# Patient Record
Sex: FEMALE | Race: WHITE | Employment: OTHER | URBAN - METROPOLITAN AREA
[De-identification: names, ages, dates, MRNs, and addresses within clinical notes are randomized per-mention and may not be internally consistent; named-entity substitution may affect disease eponyms.]

---

## 2019-07-10 ENCOUNTER — APPOINTMENT (OUTPATIENT)
Dept: CT IMAGING | Age: 84
DRG: 603 | End: 2019-07-10
Payer: MEDICARE

## 2019-07-10 ENCOUNTER — HOSPITAL ENCOUNTER (EMERGENCY)
Age: 84
Discharge: HOME | DRG: 603 | End: 2019-07-11
Attending: EMERGENCY MEDICINE
Payer: MEDICARE

## 2019-07-10 DIAGNOSIS — L02.31 CUTANEOUS ABSCESS OF BUTTOCK: ICD-10-CM

## 2019-07-10 DIAGNOSIS — L03.317 CELLULITIS OF BUTTOCK, RIGHT: ICD-10-CM

## 2019-07-10 DIAGNOSIS — R21 RASH AND OTHER NONSPECIFIC SKIN ERUPTION: Primary | ICD-10-CM

## 2019-07-10 LAB
BASOPHILS ABSOLUTE: 0.04 E9/L (ref 0–0.2)
BASOPHILS RELATIVE PERCENT: 0.3 % (ref 0–2)
EOSINOPHILS ABSOLUTE: 0.11 E9/L (ref 0.05–0.5)
EOSINOPHILS RELATIVE PERCENT: 0.9 % (ref 0–6)
HCT VFR BLD CALC: 36.3 % (ref 34–48)
HEMOGLOBIN: 12.1 G/DL (ref 11.5–15.5)
IMMATURE GRANULOCYTES #: 0.08 E9/L
IMMATURE GRANULOCYTES %: 0.6 % (ref 0–5)
LYMPHOCYTES ABSOLUTE: 1.46 E9/L (ref 1.5–4)
LYMPHOCYTES RELATIVE PERCENT: 11.5 % (ref 20–42)
MCH RBC QN AUTO: 31.3 PG (ref 26–35)
MCHC RBC AUTO-ENTMCNC: 33.3 % (ref 32–34.5)
MCV RBC AUTO: 94 FL (ref 80–99.9)
MONOCYTES ABSOLUTE: 0.72 E9/L (ref 0.1–0.95)
MONOCYTES RELATIVE PERCENT: 5.7 % (ref 2–12)
NEUTROPHILS ABSOLUTE: 10.29 E9/L (ref 1.8–7.3)
NEUTROPHILS RELATIVE PERCENT: 81 % (ref 43–80)
PDW BLD-RTO: 13 FL (ref 11.5–15)
PLATELET # BLD: 203 E9/L (ref 130–450)
PMV BLD AUTO: 9.2 FL (ref 7–12)
RBC # BLD: 3.86 E12/L (ref 3.5–5.5)
WBC # BLD: 12.7 E9/L (ref 4.5–11.5)

## 2019-07-10 PROCEDURE — 6360000002 HC RX W HCPCS: Performed by: EMERGENCY MEDICINE

## 2019-07-10 PROCEDURE — 83735 ASSAY OF MAGNESIUM: CPT

## 2019-07-10 PROCEDURE — 74176 CT ABD & PELVIS W/O CONTRAST: CPT

## 2019-07-10 PROCEDURE — 96375 TX/PRO/DX INJ NEW DRUG ADDON: CPT

## 2019-07-10 PROCEDURE — 85025 COMPLETE CBC W/AUTO DIFF WBC: CPT

## 2019-07-10 PROCEDURE — 80053 COMPREHEN METABOLIC PANEL: CPT

## 2019-07-10 PROCEDURE — 6370000000 HC RX 637 (ALT 250 FOR IP): Performed by: EMERGENCY MEDICINE

## 2019-07-10 PROCEDURE — 99284 EMERGENCY DEPT VISIT MOD MDM: CPT

## 2019-07-10 PROCEDURE — 2580000003 HC RX 258: Performed by: EMERGENCY MEDICINE

## 2019-07-10 PROCEDURE — 70450 CT HEAD/BRAIN W/O DYE: CPT

## 2019-07-10 PROCEDURE — 96374 THER/PROPH/DIAG INJ IV PUSH: CPT

## 2019-07-10 RX ORDER — 0.9 % SODIUM CHLORIDE 0.9 %
500 INTRAVENOUS SOLUTION INTRAVENOUS ONCE
Status: COMPLETED | OUTPATIENT
Start: 2019-07-10 | End: 2019-07-11

## 2019-07-10 RX ORDER — MECLIZINE HCL 12.5 MG/1
50 TABLET ORAL ONCE
Status: COMPLETED | OUTPATIENT
Start: 2019-07-10 | End: 2019-07-10

## 2019-07-10 RX ORDER — KETOROLAC TROMETHAMINE 30 MG/ML
15 INJECTION, SOLUTION INTRAMUSCULAR; INTRAVENOUS ONCE
Status: COMPLETED | OUTPATIENT
Start: 2019-07-10 | End: 2019-07-10

## 2019-07-10 RX ORDER — ONDANSETRON 2 MG/ML
4 INJECTION INTRAMUSCULAR; INTRAVENOUS ONCE
Status: COMPLETED | OUTPATIENT
Start: 2019-07-10 | End: 2019-07-10

## 2019-07-10 RX ADMIN — MECLIZINE 50 MG: 12.5 TABLET ORAL at 23:38

## 2019-07-10 RX ADMIN — ONDANSETRON 4 MG: 2 INJECTION INTRAMUSCULAR; INTRAVENOUS at 23:38

## 2019-07-10 RX ADMIN — KETOROLAC TROMETHAMINE 15 MG: 30 INJECTION, SOLUTION INTRAMUSCULAR; INTRAVENOUS at 23:38

## 2019-07-10 RX ADMIN — SODIUM CHLORIDE 500 ML: 9 INJECTION, SOLUTION INTRAVENOUS at 23:38

## 2019-07-10 ASSESSMENT — PAIN DESCRIPTION - FREQUENCY: FREQUENCY: CONTINUOUS

## 2019-07-10 ASSESSMENT — PAIN SCALES - GENERAL
PAINLEVEL_OUTOF10: 10
PAINLEVEL_OUTOF10: 10

## 2019-07-10 ASSESSMENT — PAIN DESCRIPTION - DESCRIPTORS: DESCRIPTORS: ACHING;THROBBING;TENDER

## 2019-07-10 ASSESSMENT — PAIN DESCRIPTION - LOCATION: LOCATION: BUTTOCKS

## 2019-07-10 ASSESSMENT — PAIN DESCRIPTION - PAIN TYPE: TYPE: ACUTE PAIN

## 2019-07-11 VITALS
OXYGEN SATURATION: 95 % | WEIGHT: 116 LBS | RESPIRATION RATE: 18 BRPM | HEART RATE: 75 BPM | DIASTOLIC BLOOD PRESSURE: 59 MMHG | HEIGHT: 60 IN | SYSTOLIC BLOOD PRESSURE: 129 MMHG | BODY MASS INDEX: 22.78 KG/M2 | TEMPERATURE: 98.2 F

## 2019-07-11 LAB
ALBUMIN SERPL-MCNC: 3.9 G/DL (ref 3.5–5.2)
ALP BLD-CCNC: 57 U/L (ref 35–104)
ALT SERPL-CCNC: 14 U/L (ref 0–32)
ANION GAP SERPL CALCULATED.3IONS-SCNC: 16 MMOL/L (ref 7–16)
AST SERPL-CCNC: 29 U/L (ref 0–31)
BILIRUB SERPL-MCNC: 0.4 MG/DL (ref 0–1.2)
BUN BLDV-MCNC: 15 MG/DL (ref 8–23)
CALCIUM SERPL-MCNC: 9.7 MG/DL (ref 8.6–10.2)
CHLORIDE BLD-SCNC: 98 MMOL/L (ref 98–107)
CO2: 26 MMOL/L (ref 22–29)
CREAT SERPL-MCNC: 0.7 MG/DL (ref 0.5–1)
GFR AFRICAN AMERICAN: >60
GFR NON-AFRICAN AMERICAN: >60 ML/MIN/1.73
GLUCOSE BLD-MCNC: 141 MG/DL (ref 74–99)
MAGNESIUM: 1.3 MG/DL (ref 1.6–2.6)
POTASSIUM REFLEX MAGNESIUM: 3.1 MMOL/L (ref 3.5–5)
SODIUM BLD-SCNC: 140 MMOL/L (ref 132–146)
TOTAL PROTEIN: 6.9 G/DL (ref 6.4–8.3)

## 2019-07-11 PROCEDURE — 6370000000 HC RX 637 (ALT 250 FOR IP)

## 2019-07-11 RX ORDER — POTASSIUM CHLORIDE 20 MEQ/1
40 TABLET, EXTENDED RELEASE ORAL ONCE
Status: DISCONTINUED | OUTPATIENT
Start: 2019-07-11 | End: 2019-07-11

## 2019-07-11 RX ORDER — DOXYCYCLINE HYCLATE 100 MG
100 TABLET ORAL 2 TIMES DAILY WITH MEALS
Qty: 20 TABLET | Refills: 0 | Status: ON HOLD | OUTPATIENT
Start: 2019-07-11 | End: 2019-07-12

## 2019-07-11 RX ORDER — POTASSIUM CHLORIDE 1.5 G/1.77G
40 POWDER, FOR SOLUTION ORAL ONCE
Status: DISCONTINUED | OUTPATIENT
Start: 2019-07-11 | End: 2019-07-11 | Stop reason: CLARIF

## 2019-07-11 RX ADMIN — POTASSIUM BICARBONATE 40 MEQ: 782 TABLET, EFFERVESCENT ORAL at 01:17

## 2019-07-11 NOTE — ED PROVIDER NOTES
Department of Emergency Medicine   ED  Provider Note  Admit Date/RoomTime: 7/10/2019  9:40 PM  ED Room: 24/24          History of Present Illness:  7/10/19, Time: 10:31 PM         Chidi Martinez is a 80 y.o. female presenting to the ED for rash, beginning 4 months ago. The complaint has been persistent, moderate in severity, and worsened by palpation. Pt states that she has red, rash to her left gluteal area. Reports that it started off like a small pimple but then increased in size and redness. States that the area is painful. Pt also reports that she has red, itchy rashes to her upper back, suprapubic area and lower abdomen. Was on her back first then spread to her suprapubic area and abdomen. States that those rashes started as a flat red spot which then  developed a blister which then burst. Very itchy at times but especially at onset and with blister. States that they remain itchy and red. Has been on antibiotics in April, also had prednisone, which did not help. Reports that she was seen by a dermatologist who prescribed her a compound cream which worsened her rash worse. Did not have a biopsy collected. Pt also complains of dizziness and nausea which began today. States that she has worsening dizziness when getting up and walking. Described it as feeling off balanced / room spinning. Denies recent URI symptoms. Denies tinnitus. No focal neuro deficits - Visual changes, Slurred Speech, Facial Droop, focal weakness / numbness / tingling. Denies spinning sensation. Has hx of hypertension. Denies chest pain, shortness of breath, fever, chills, abdominal pain, emesis, diarrhea, constipation, and further complaints at this time.       Review of Systems:   Pertinent positives and negatives are stated within HPI, all other systems reviewed and are negative.      --------------------------------------------- PAST HISTORY ---------------------------------------------  Past Medical History:  has a past medical

## 2019-07-12 ENCOUNTER — HOSPITAL ENCOUNTER (INPATIENT)
Age: 84
LOS: 5 days | Discharge: HOME HEALTH | DRG: 603 | End: 2019-07-17
Attending: INTERNAL MEDICINE | Admitting: INTERNAL MEDICINE
Payer: MEDICARE

## 2019-07-12 DIAGNOSIS — L02.31 GLUTEAL ABSCESS: Primary | ICD-10-CM

## 2019-07-12 PROBLEM — L03.90 CELLULITIS: Status: ACTIVE | Noted: 2019-07-12

## 2019-07-12 LAB
ALBUMIN SERPL-MCNC: 3.5 G/DL (ref 3.5–5.2)
ALP BLD-CCNC: 61 U/L (ref 35–104)
ALT SERPL-CCNC: 12 U/L (ref 0–32)
ANION GAP SERPL CALCULATED.3IONS-SCNC: 17 MMOL/L (ref 7–16)
ANTISTREPTOLYSIN-O: <20 IU/ML (ref 0–200)
AST SERPL-CCNC: 23 U/L (ref 0–31)
BASOPHILS ABSOLUTE: 0.05 E9/L (ref 0–0.2)
BASOPHILS RELATIVE PERCENT: 0.2 % (ref 0–2)
BILIRUB SERPL-MCNC: 0.5 MG/DL (ref 0–1.2)
BUN BLDV-MCNC: 16 MG/DL (ref 8–23)
C-REACTIVE PROTEIN: 28.9 MG/DL (ref 0–0.4)
CALCIUM SERPL-MCNC: 9.4 MG/DL (ref 8.6–10.2)
CHLORIDE BLD-SCNC: 96 MMOL/L (ref 98–107)
CO2: 24 MMOL/L (ref 22–29)
CREAT SERPL-MCNC: 0.6 MG/DL (ref 0.5–1)
EOSINOPHILS ABSOLUTE: 0 E9/L (ref 0.05–0.5)
EOSINOPHILS RELATIVE PERCENT: 0 % (ref 0–6)
GFR AFRICAN AMERICAN: >60
GFR NON-AFRICAN AMERICAN: >60 ML/MIN/1.73
GLUCOSE BLD-MCNC: 143 MG/DL (ref 74–99)
HCT VFR BLD CALC: 34.8 % (ref 34–48)
HEMOGLOBIN: 11.5 G/DL (ref 11.5–15.5)
IMMATURE GRANULOCYTES #: 0.19 E9/L
IMMATURE GRANULOCYTES %: 0.9 % (ref 0–5)
LACTIC ACID: 1.7 MMOL/L (ref 0.5–2.2)
LYMPHOCYTES ABSOLUTE: 1.27 E9/L (ref 1.5–4)
LYMPHOCYTES RELATIVE PERCENT: 5.7 % (ref 20–42)
MCH RBC QN AUTO: 31.2 PG (ref 26–35)
MCHC RBC AUTO-ENTMCNC: 33 % (ref 32–34.5)
MCV RBC AUTO: 94.3 FL (ref 80–99.9)
MONOCYTES ABSOLUTE: 1.13 E9/L (ref 0.1–0.95)
MONOCYTES RELATIVE PERCENT: 5.1 % (ref 2–12)
NEUTROPHILS ABSOLUTE: 19.58 E9/L (ref 1.8–7.3)
NEUTROPHILS RELATIVE PERCENT: 88.1 % (ref 43–80)
PDW BLD-RTO: 12.7 FL (ref 11.5–15)
PLATELET # BLD: 201 E9/L (ref 130–450)
PMV BLD AUTO: 10 FL (ref 7–12)
POTASSIUM SERPL-SCNC: 2.4 MMOL/L (ref 3.5–5)
RBC # BLD: 3.69 E12/L (ref 3.5–5.5)
REASON FOR REJECTION: NORMAL
REJECTED TEST: NORMAL
SODIUM BLD-SCNC: 137 MMOL/L (ref 132–146)
TOTAL PROTEIN: 6.9 G/DL (ref 6.4–8.3)
WBC # BLD: 22.2 E9/L (ref 4.5–11.5)

## 2019-07-12 PROCEDURE — 86060 ANTISTREPTOLYSIN O TITER: CPT

## 2019-07-12 PROCEDURE — 6370000000 HC RX 637 (ALT 250 FOR IP): Performed by: INTERNAL MEDICINE

## 2019-07-12 PROCEDURE — 2060000000 HC ICU INTERMEDIATE R&B

## 2019-07-12 PROCEDURE — 87081 CULTURE SCREEN ONLY: CPT

## 2019-07-12 PROCEDURE — 6360000002 HC RX W HCPCS: Performed by: INTERNAL MEDICINE

## 2019-07-12 PROCEDURE — 83605 ASSAY OF LACTIC ACID: CPT

## 2019-07-12 PROCEDURE — 85025 COMPLETE CBC W/AUTO DIFF WBC: CPT

## 2019-07-12 PROCEDURE — 80053 COMPREHEN METABOLIC PANEL: CPT

## 2019-07-12 PROCEDURE — 86140 C-REACTIVE PROTEIN: CPT

## 2019-07-12 PROCEDURE — 2580000003 HC RX 258: Performed by: INTERNAL MEDICINE

## 2019-07-12 PROCEDURE — 6360000002 HC RX W HCPCS: Performed by: SPECIALIST

## 2019-07-12 PROCEDURE — 36415 COLL VENOUS BLD VENIPUNCTURE: CPT

## 2019-07-12 PROCEDURE — 87040 BLOOD CULTURE FOR BACTERIA: CPT

## 2019-07-12 PROCEDURE — 2580000003 HC RX 258: Performed by: SPECIALIST

## 2019-07-12 RX ORDER — MAGNESIUM SULFATE IN WATER 40 MG/ML
2 INJECTION, SOLUTION INTRAVENOUS ONCE
Status: COMPLETED | OUTPATIENT
Start: 2019-07-12 | End: 2019-07-12

## 2019-07-12 RX ORDER — SODIUM CHLORIDE 0.9 % (FLUSH) 0.9 %
10 SYRINGE (ML) INJECTION EVERY 12 HOURS SCHEDULED
Status: DISCONTINUED | OUTPATIENT
Start: 2019-07-12 | End: 2019-07-17 | Stop reason: HOSPADM

## 2019-07-12 RX ORDER — ONDANSETRON 2 MG/ML
4 INJECTION INTRAMUSCULAR; INTRAVENOUS EVERY 6 HOURS PRN
Status: DISCONTINUED | OUTPATIENT
Start: 2019-07-12 | End: 2019-07-17 | Stop reason: HOSPADM

## 2019-07-12 RX ORDER — POTASSIUM BICARBONATE 25 MEQ/1
25 TABLET, EFFERVESCENT ORAL 2 TIMES DAILY
COMMUNITY
End: 2019-09-11

## 2019-07-12 RX ORDER — ACETAMINOPHEN 325 MG/1
650 TABLET ORAL EVERY 4 HOURS PRN
Status: DISCONTINUED | OUTPATIENT
Start: 2019-07-12 | End: 2019-07-13

## 2019-07-12 RX ORDER — POTASSIUM CHLORIDE 20 MEQ/1
40 TABLET, EXTENDED RELEASE ORAL 2 TIMES DAILY
Status: DISCONTINUED | OUTPATIENT
Start: 2019-07-12 | End: 2019-07-14

## 2019-07-12 RX ORDER — 0.9 % SODIUM CHLORIDE 0.9 %
500 INTRAVENOUS SOLUTION INTRAVENOUS ONCE
Status: COMPLETED | OUTPATIENT
Start: 2019-07-12 | End: 2019-07-12

## 2019-07-12 RX ORDER — ATENOLOL AND CHLORTHALIDONE TABLET 100; 25 MG/1; MG/1
1 TABLET ORAL DAILY
Status: ON HOLD | COMMUNITY
End: 2019-07-16 | Stop reason: HOSPADM

## 2019-07-12 RX ORDER — SODIUM CHLORIDE 9 MG/ML
INJECTION, SOLUTION INTRAVENOUS CONTINUOUS
Status: DISCONTINUED | OUTPATIENT
Start: 2019-07-12 | End: 2019-07-14

## 2019-07-12 RX ORDER — ACETAMINOPHEN, ASPIRIN AND CAFFEINE 250; 250; 65 MG/1; MG/1; MG/1
1 TABLET, FILM COATED ORAL EVERY 6 HOURS PRN
COMMUNITY

## 2019-07-12 RX ORDER — SODIUM CHLORIDE 0.9 % (FLUSH) 0.9 %
10 SYRINGE (ML) INJECTION PRN
Status: DISCONTINUED | OUTPATIENT
Start: 2019-07-12 | End: 2019-07-17 | Stop reason: HOSPADM

## 2019-07-12 RX ADMIN — ACETAMINOPHEN 650 MG: 325 TABLET ORAL at 21:34

## 2019-07-12 RX ADMIN — SODIUM CHLORIDE 500 ML: 9 INJECTION, SOLUTION INTRAVENOUS at 20:15

## 2019-07-12 RX ADMIN — ONDANSETRON 4 MG: 2 INJECTION INTRAMUSCULAR; INTRAVENOUS at 16:51

## 2019-07-12 RX ADMIN — CEFAZOLIN 1 G: 1 INJECTION, POWDER, FOR SOLUTION INTRAMUSCULAR; INTRAVENOUS; PARENTERAL at 18:01

## 2019-07-12 RX ADMIN — ENOXAPARIN SODIUM 40 MG: 40 INJECTION SUBCUTANEOUS at 16:50

## 2019-07-12 RX ADMIN — POTASSIUM CHLORIDE 40 MEQ: 20 TABLET, EXTENDED RELEASE ORAL at 20:30

## 2019-07-12 RX ADMIN — MAGNESIUM SULFATE HEPTAHYDRATE 2 G: 40 INJECTION, SOLUTION INTRAVENOUS at 21:35

## 2019-07-12 RX ADMIN — SODIUM CHLORIDE: 9 INJECTION, SOLUTION INTRAVENOUS at 16:50

## 2019-07-12 ASSESSMENT — PAIN SCALES - GENERAL
PAINLEVEL_OUTOF10: 3
PAINLEVEL_OUTOF10: 0
PAINLEVEL_OUTOF10: 3
PAINLEVEL_OUTOF10: 3

## 2019-07-12 NOTE — CONSULTS
rhinorrhea or epistaxis  Respiratory: No dyspnea or cough  Cardiovascular: No chest pain or palpitations  GI: No vomiting or diarrhea. Patient does admit to having nausea. : No burning, urgency or frequency on urination  Musculoskeletal: No arthralgias or myalgias  Skin: In the HPI    PHYSICAL EXAM:    Vitals: There were no vitals taken for this visit. Constitutional: The patient is awake, alert, and oriented. Daughter present. Skin: Warm and dry. See below. HEENT: Eyes show round, and reactive pupils. No jaundice. Moist mucous membranes, no ulcerations, no thrush. Neck: Supple to movements. No lymphadenopathy. Chest: No use of accessory muscles to breathe. Symmetrical expansion. Auscultation reveals no wheezing, crackles, or rhonchi. Cardiovascular: S1 and S2 are rhythmic and regular. No murmurs appreciated. Abdomen: Positive bowel sounds to auscultation. Benign to palpation. No masses felt. No hepatosplenomegaly. The suprapubic area shows a couple of scabbed lesions with some slight surrounding erythema. No pustules or fluctuance compatible with abscesses. No surrounding induration. The right buttock shows a necrotic area with surrounding erythema and induration. It measures approximately 2-1/2 x 6 cm. No fluctuance was noted. Extremities: No clubbing, no cyanosis, no edema.   Lines: peripheral      CBC+dif:  Recent Labs     07/10/19  2333   WBC 12.7*   HGB 12.1   HCT 36.3   MCV 94.0      NEUTROABS 10.29*     No results found for: CRP   No results found for: CRPHS  No results found for: SEDRATE  Lab Results   Component Value Date    ALT 14 07/10/2019    AST 29 07/10/2019    ALKPHOS 57 07/10/2019    BILITOT 0.4 07/10/2019     Lab Results   Component Value Date     07/10/2019    K 3.1 07/10/2019    CL 98 07/10/2019    CO2 26 07/10/2019    BUN 15 07/10/2019    CREATININE 0.7 07/10/2019    GFRAA >60 07/10/2019    LABGLOM >60 07/10/2019    GLUCOSE 141 07/10/2019    PROT 6.9

## 2019-07-12 NOTE — H&P
Date    WBC 12.7 07/10/2019    RBC 3.86 07/10/2019    HGB 12.1 07/10/2019    HCT 36.3 07/10/2019     07/10/2019    MCV 94.0 07/10/2019    MCH 31.3 07/10/2019    MCHC 33.3 07/10/2019    RDW 13.0 07/10/2019    LYMPHOPCT 11.5 07/10/2019    MONOPCT 5.7 07/10/2019    BASOPCT 0.3 07/10/2019    MONOSABS 0.72 07/10/2019    LYMPHSABS 1.46 07/10/2019    EOSABS 0.11 07/10/2019    BASOSABS 0.04 07/10/2019     CMP:  Lab Results   Component Value Date     07/10/2019    K 3.1 07/10/2019    CL 98 07/10/2019    CO2 26 07/10/2019    BUN 15 07/10/2019    CREATININE 0.7 07/10/2019    GFRAA >60 07/10/2019    LABGLOM >60 07/10/2019    GLUCOSE 141 07/10/2019    PROT 6.9 07/10/2019    LABALBU 3.9 07/10/2019    CALCIUM 9.7 07/10/2019    BILITOT 0.4 07/10/2019    ALKPHOS 57 07/10/2019    AST 29 07/10/2019    ALT 14 07/10/2019     PT/INR:  No results found for: PROTIME, INR  @cktotal:3,ckmb:3,ckmbindex:3,troponini:3@  U/A:  No results found for: NITRU, COLORU, PHUR, LABCAST, WBCUA, RBCUA, MUCUS, TRICHOMONAS, YEAST, BACTERIA, CLARITYU, SPECGRAV, UROBILINOGEN, BILIRUBINUR, BLOODU, GLUCOSEU, KETUA, AMORPHOUS       ASSESSMENT:      Active Problems:    Gluteal abscess  Resolved Problems:    * No resolved hospital problems.  *              PLAN:  Cellulitis with gluteal abscess-  Likely recurrent staph   will ask ID to see  gen surgery consult        Libra Fermin DO  3:02 PM  7/12/2019

## 2019-07-12 NOTE — PROGRESS NOTES
Wound care consult completed through [facility].        Electronically signed by Paul Ang RN on 7/12/2019 at 5:40 PM

## 2019-07-13 LAB
ANION GAP SERPL CALCULATED.3IONS-SCNC: 16 MMOL/L (ref 7–16)
ANION GAP SERPL CALCULATED.3IONS-SCNC: 17 MMOL/L (ref 7–16)
BASOPHILS ABSOLUTE: 0.04 E9/L (ref 0–0.2)
BASOPHILS RELATIVE PERCENT: 0.2 % (ref 0–2)
BUN BLDV-MCNC: 13 MG/DL (ref 8–23)
BUN BLDV-MCNC: 14 MG/DL (ref 8–23)
CALCIUM SERPL-MCNC: 8.9 MG/DL (ref 8.6–10.2)
CALCIUM SERPL-MCNC: 9 MG/DL (ref 8.6–10.2)
CHLORIDE BLD-SCNC: 102 MMOL/L (ref 98–107)
CHLORIDE BLD-SCNC: 97 MMOL/L (ref 98–107)
CO2: 22 MMOL/L (ref 22–29)
CO2: 22 MMOL/L (ref 22–29)
CREAT SERPL-MCNC: 0.6 MG/DL (ref 0.5–1)
CREAT SERPL-MCNC: 0.6 MG/DL (ref 0.5–1)
EOSINOPHILS ABSOLUTE: 0.01 E9/L (ref 0.05–0.5)
EOSINOPHILS RELATIVE PERCENT: 0 % (ref 0–6)
GFR AFRICAN AMERICAN: >60
GFR AFRICAN AMERICAN: >60
GFR NON-AFRICAN AMERICAN: >60 ML/MIN/1.73
GFR NON-AFRICAN AMERICAN: >60 ML/MIN/1.73
GLUCOSE BLD-MCNC: 108 MG/DL (ref 74–99)
GLUCOSE BLD-MCNC: 117 MG/DL (ref 74–99)
HCT VFR BLD CALC: 33.7 % (ref 34–48)
HEMOGLOBIN: 11.2 G/DL (ref 11.5–15.5)
IMMATURE GRANULOCYTES #: 0.36 E9/L
IMMATURE GRANULOCYTES %: 1.7 % (ref 0–5)
LYMPHOCYTES ABSOLUTE: 1.98 E9/L (ref 1.5–4)
LYMPHOCYTES RELATIVE PERCENT: 9.2 % (ref 20–42)
MAGNESIUM: 2 MG/DL (ref 1.6–2.6)
MCH RBC QN AUTO: 31.3 PG (ref 26–35)
MCHC RBC AUTO-ENTMCNC: 33.2 % (ref 32–34.5)
MCV RBC AUTO: 94.1 FL (ref 80–99.9)
MONOCYTES ABSOLUTE: 1.24 E9/L (ref 0.1–0.95)
MONOCYTES RELATIVE PERCENT: 5.8 % (ref 2–12)
NEUTROPHILS ABSOLUTE: 17.87 E9/L (ref 1.8–7.3)
NEUTROPHILS RELATIVE PERCENT: 83.1 % (ref 43–80)
PDW BLD-RTO: 12.7 FL (ref 11.5–15)
PLATELET # BLD: 215 E9/L (ref 130–450)
PMV BLD AUTO: 9.2 FL (ref 7–12)
POTASSIUM SERPL-SCNC: 2.8 MMOL/L (ref 3.5–5)
POTASSIUM SERPL-SCNC: 3.3 MMOL/L (ref 3.5–5)
RBC # BLD: 3.58 E12/L (ref 3.5–5.5)
SODIUM BLD-SCNC: 136 MMOL/L (ref 132–146)
SODIUM BLD-SCNC: 140 MMOL/L (ref 132–146)
WBC # BLD: 21.5 E9/L (ref 4.5–11.5)

## 2019-07-13 PROCEDURE — 6370000000 HC RX 637 (ALT 250 FOR IP): Performed by: INTERNAL MEDICINE

## 2019-07-13 PROCEDURE — 99223 1ST HOSP IP/OBS HIGH 75: CPT | Performed by: INTERNAL MEDICINE

## 2019-07-13 PROCEDURE — APPSS60 APP SPLIT SHARED TIME 46-60 MINUTES: Performed by: CLINICAL NURSE SPECIALIST

## 2019-07-13 PROCEDURE — 85025 COMPLETE CBC W/AUTO DIFF WBC: CPT

## 2019-07-13 PROCEDURE — 2580000003 HC RX 258: Performed by: SPECIALIST

## 2019-07-13 PROCEDURE — 6370000000 HC RX 637 (ALT 250 FOR IP)

## 2019-07-13 PROCEDURE — 87186 SC STD MICRODIL/AGAR DIL: CPT

## 2019-07-13 PROCEDURE — 83735 ASSAY OF MAGNESIUM: CPT

## 2019-07-13 PROCEDURE — 87075 CULTR BACTERIA EXCEPT BLOOD: CPT

## 2019-07-13 PROCEDURE — 6360000002 HC RX W HCPCS: Performed by: INTERNAL MEDICINE

## 2019-07-13 PROCEDURE — 93005 ELECTROCARDIOGRAM TRACING: CPT | Performed by: CLINICAL NURSE SPECIALIST

## 2019-07-13 PROCEDURE — 80048 BASIC METABOLIC PNL TOTAL CA: CPT

## 2019-07-13 PROCEDURE — 0H98XZZ DRAINAGE OF BUTTOCK SKIN, EXTERNAL APPROACH: ICD-10-PCS | Performed by: SURGERY

## 2019-07-13 PROCEDURE — 2580000003 HC RX 258: Performed by: INTERNAL MEDICINE

## 2019-07-13 PROCEDURE — 6360000002 HC RX W HCPCS: Performed by: SPECIALIST

## 2019-07-13 PROCEDURE — 2500000003 HC RX 250 WO HCPCS: Performed by: SURGERY

## 2019-07-13 PROCEDURE — 2060000000 HC ICU INTERMEDIATE R&B

## 2019-07-13 PROCEDURE — 2580000003 HC RX 258: Performed by: SURGERY

## 2019-07-13 PROCEDURE — 6370000000 HC RX 637 (ALT 250 FOR IP): Performed by: SURGERY

## 2019-07-13 PROCEDURE — 6360000002 HC RX W HCPCS: Performed by: CLINICAL NURSE SPECIALIST

## 2019-07-13 PROCEDURE — 36415 COLL VENOUS BLD VENIPUNCTURE: CPT

## 2019-07-13 PROCEDURE — 6360000002 HC RX W HCPCS: Performed by: SURGERY

## 2019-07-13 PROCEDURE — 87070 CULTURE OTHR SPECIMN AEROBIC: CPT

## 2019-07-13 RX ORDER — DIGOXIN 250 MCG
TABLET ORAL
Status: COMPLETED
Start: 2019-07-13 | End: 2019-07-13

## 2019-07-13 RX ORDER — ACETAMINOPHEN 650 MG
TABLET, EXTENDED RELEASE ORAL PRN
Status: DISCONTINUED | OUTPATIENT
Start: 2019-07-13 | End: 2019-07-17 | Stop reason: HOSPADM

## 2019-07-13 RX ORDER — HYDROCODONE BITARTRATE AND ACETAMINOPHEN 5; 325 MG/1; MG/1
1 TABLET ORAL EVERY 6 HOURS PRN
Status: DISCONTINUED | OUTPATIENT
Start: 2019-07-13 | End: 2019-07-17 | Stop reason: HOSPADM

## 2019-07-13 RX ORDER — HYDROXYZINE PAMOATE 25 MG/1
25 CAPSULE ORAL 2 TIMES DAILY PRN
Status: DISCONTINUED | OUTPATIENT
Start: 2019-07-13 | End: 2019-07-17 | Stop reason: HOSPADM

## 2019-07-13 RX ORDER — METOPROLOL TARTRATE 50 MG/1
TABLET, FILM COATED ORAL
Status: COMPLETED
Start: 2019-07-13 | End: 2019-07-13

## 2019-07-13 RX ORDER — METOPROLOL TARTRATE 50 MG/1
50 TABLET, FILM COATED ORAL ONCE
Status: DISCONTINUED | OUTPATIENT
Start: 2019-07-13 | End: 2019-07-17 | Stop reason: HOSPADM

## 2019-07-13 RX ORDER — LIDOCAINE HYDROCHLORIDE 10 MG/ML
5 INJECTION, SOLUTION EPIDURAL; INFILTRATION; INTRACAUDAL; PERINEURAL ONCE
Status: COMPLETED | OUTPATIENT
Start: 2019-07-13 | End: 2019-07-13

## 2019-07-13 RX ORDER — METOPROLOL TARTRATE 50 MG/1
50 TABLET, FILM COATED ORAL 2 TIMES DAILY
Status: DISCONTINUED | OUTPATIENT
Start: 2019-07-13 | End: 2019-07-16

## 2019-07-13 RX ORDER — MECLIZINE HCL 12.5 MG/1
12.5 TABLET ORAL 3 TIMES DAILY PRN
Status: DISCONTINUED | OUTPATIENT
Start: 2019-07-13 | End: 2019-07-17 | Stop reason: HOSPADM

## 2019-07-13 RX ORDER — POTASSIUM CHLORIDE 20 MEQ/1
40 TABLET, EXTENDED RELEASE ORAL ONCE
Status: DISCONTINUED | OUTPATIENT
Start: 2019-07-13 | End: 2019-07-17

## 2019-07-13 RX ORDER — DIGOXIN 250 MCG
250 TABLET ORAL ONCE
Status: DISCONTINUED | OUTPATIENT
Start: 2019-07-13 | End: 2019-07-17 | Stop reason: HOSPADM

## 2019-07-13 RX ORDER — PROMETHAZINE HYDROCHLORIDE 25 MG/1
12.5 TABLET ORAL EVERY 6 HOURS PRN
Status: DISCONTINUED | OUTPATIENT
Start: 2019-07-13 | End: 2019-07-17 | Stop reason: HOSPADM

## 2019-07-13 RX ORDER — POTASSIUM CHLORIDE 7.45 MG/ML
10 INJECTION INTRAVENOUS
Status: COMPLETED | OUTPATIENT
Start: 2019-07-13 | End: 2019-07-13

## 2019-07-13 RX ADMIN — LIDOCAINE HYDROCHLORIDE 5 ML: 10 INJECTION, SOLUTION EPIDURAL; INFILTRATION; INTRACAUDAL; PERINEURAL at 17:53

## 2019-07-13 RX ADMIN — DIGOXIN: 250 TABLET ORAL at 02:00

## 2019-07-13 RX ADMIN — HYDROXYZINE PAMOATE 25 MG: 25 CAPSULE ORAL at 17:37

## 2019-07-13 RX ADMIN — CEFTRIAXONE 1 G: 1 INJECTION, POWDER, FOR SOLUTION INTRAMUSCULAR; INTRAVENOUS at 23:43

## 2019-07-13 RX ADMIN — ONDANSETRON 4 MG: 2 INJECTION INTRAMUSCULAR; INTRAVENOUS at 08:23

## 2019-07-13 RX ADMIN — METOPROLOL TARTRATE 50 MG: 50 TABLET ORAL at 08:20

## 2019-07-13 RX ADMIN — METOPROLOL TARTRATE: 50 TABLET, FILM COATED ORAL at 02:00

## 2019-07-13 RX ADMIN — POTASSIUM CHLORIDE 10 MEQ: 10 INJECTION, SOLUTION INTRAVENOUS at 11:33

## 2019-07-13 RX ADMIN — POTASSIUM CHLORIDE 10 MEQ: 10 INJECTION, SOLUTION INTRAVENOUS at 14:44

## 2019-07-13 RX ADMIN — POTASSIUM CHLORIDE 10 MEQ: 10 INJECTION, SOLUTION INTRAVENOUS at 10:32

## 2019-07-13 RX ADMIN — ENOXAPARIN SODIUM 40 MG: 40 INJECTION SUBCUTANEOUS at 08:20

## 2019-07-13 RX ADMIN — METOPROLOL TARTRATE 50 MG: 50 TABLET ORAL at 20:38

## 2019-07-13 RX ADMIN — POTASSIUM CHLORIDE 40 MEQ: 20 TABLET, EXTENDED RELEASE ORAL at 08:19

## 2019-07-13 RX ADMIN — POTASSIUM CHLORIDE 10 MEQ: 10 INJECTION, SOLUTION INTRAVENOUS at 13:18

## 2019-07-13 RX ADMIN — CEFAZOLIN 1 G: 1 INJECTION, POWDER, FOR SOLUTION INTRAMUSCULAR; INTRAVENOUS; PARENTERAL at 17:26

## 2019-07-13 RX ADMIN — SODIUM CHLORIDE: 9 INJECTION, SOLUTION INTRAVENOUS at 11:33

## 2019-07-13 RX ADMIN — HYDROCODONE BITARTRATE AND ACETAMINOPHEN 1 TABLET: 5; 325 TABLET ORAL at 18:16

## 2019-07-13 RX ADMIN — MECLIZINE 12.5 MG: 12.5 TABLET ORAL at 11:55

## 2019-07-13 RX ADMIN — POTASSIUM CHLORIDE 40 MEQ: 20 TABLET, EXTENDED RELEASE ORAL at 16:07

## 2019-07-13 RX ADMIN — PROMETHAZINE HYDROCHLORIDE 12.5 MG: 25 TABLET ORAL at 11:55

## 2019-07-13 RX ADMIN — CEFAZOLIN 1 G: 1 INJECTION, POWDER, FOR SOLUTION INTRAMUSCULAR; INTRAVENOUS; PARENTERAL at 06:30

## 2019-07-13 RX ADMIN — Medication: at 17:53

## 2019-07-13 RX ADMIN — Medication 10 ML: at 20:39

## 2019-07-13 RX ADMIN — ONDANSETRON 4 MG: 2 INJECTION INTRAMUSCULAR; INTRAVENOUS at 14:48

## 2019-07-13 RX ADMIN — METRONIDAZOLE 500 MG: 500 INJECTION, SOLUTION INTRAVENOUS at 18:18

## 2019-07-13 RX ADMIN — SODIUM CHLORIDE: 9 INJECTION, SOLUTION INTRAVENOUS at 00:39

## 2019-07-13 ASSESSMENT — PAIN SCALES - GENERAL
PAINLEVEL_OUTOF10: 0
PAINLEVEL_OUTOF10: 0
PAINLEVEL_OUTOF10: 4
PAINLEVEL_OUTOF10: 0

## 2019-07-13 ASSESSMENT — PAIN DESCRIPTION - LOCATION: LOCATION: BUTTOCKS

## 2019-07-13 ASSESSMENT — PAIN DESCRIPTION - DESCRIPTORS: DESCRIPTORS: DISCOMFORT;SORE;STABBING

## 2019-07-13 ASSESSMENT — ENCOUNTER SYMPTOMS
SHORTNESS OF BREATH: 0
DIARRHEA: 0
VOMITING: 0
NAUSEA: 1

## 2019-07-13 ASSESSMENT — PAIN DESCRIPTION - FREQUENCY: FREQUENCY: CONTINUOUS

## 2019-07-13 ASSESSMENT — PAIN DESCRIPTION - ORIENTATION: ORIENTATION: RIGHT;LOWER

## 2019-07-13 ASSESSMENT — PAIN - FUNCTIONAL ASSESSMENT: PAIN_FUNCTIONAL_ASSESSMENT: PREVENTS OR INTERFERES SOME ACTIVE ACTIVITIES AND ADLS

## 2019-07-13 ASSESSMENT — PAIN DESCRIPTION - ONSET: ONSET: ON-GOING

## 2019-07-13 ASSESSMENT — PAIN DESCRIPTION - PROGRESSION: CLINICAL_PROGRESSION: GRADUALLY WORSENING

## 2019-07-13 NOTE — CONSULTS
Inpatient Cardiology Consultation      Reason for Consult:  SVT    Consulting Physician: Dr. Ksenia Demarco    Requesting Physician:  Dr. Ryan Sender    Date of Consultation: 7/13/2019    History of Present Illness:    Mrs. Kari Chu is a 80year old lady who is previously unknown to our practice. She has a history of hypertension, and denies prior diagnosis of MI, CHF, or arrhythmia. She has been treated for a rash on her abdomen, back, and pelvic area since March 2019. She presented to the ED on July 10 due to this rash with associated discomfort, as well as nausea and dizziness; potassium was 3.1, magnesium 1.3, and CT of the abdomen showed probable cellulitis. She was treated with IV fluids, potassium supplement, Toradol, and Zofran and discharged home with antibiotics, but apparently did not fill the prescription. She was seen in Dr. Tej Posada office yesterday due to worsening discomfort, was thought to have an abscess, and admitted for further treatment. She was seen in consultation by infectious disease, started on antibiotics, and general surgery has been consulted. Last night, she had multiple episodes of SVT, and was given 50 mg of Lopressor and 250 mcg of Digoxin orally. Potassium level was 2.8. She describes feeling her heart \"jumping\" and \"beating fast\", and had similar episodes about one week ago and then two to three days ago. She denies associated chest discomfort, and has had frequent dizziness over the past several days which she attributes to not eating well. She has had no further palpitations this morning, but has continued nausea for which she is receiving Zofran. Past Medical History:    1. Hypertension  2. Polio in childhood  3. History of appendectomy  4. Depression     Medications Prior to Admit:  Prior to Admission medications    Medication Sig Start Date End Date Taking?  Authorizing Provider   atenolol-chlorthalidone (TENORETIC 100) 100-25 MG per tablet Take 1 tablet by mouth daily   Yes PSYCH: Oriented to person, place and time. Speech clear and appropriate. Follows all commands. Pleasant affect     Telemetry: SR with episodes of SVT with rates in 150s to 160s overnight  ECG: pending       Intake/Output Summary (Last 24 hours) at 7/13/2019 0923  Last data filed at 7/13/2019 0630  Gross per 24 hour   Intake 1523.33 ml   Output 300 ml   Net 1223.33 ml       Labs:   CBC:   Recent Labs     07/12/19  1700 07/13/19  0125   WBC 22.2* 21.5*   HGB 11.5 11.2*   HCT 34.8 33.7*    215     BMP:   Recent Labs     07/13/19  0125 07/13/19  0707    140   K 2.8* 3.3*   CO2 22 22   BUN 14 13   CREATININE 0.6 0.6   LABGLOM >60 >60   CALCIUM 8.9 9.0     Mag:   Recent Labs     07/10/19  2333   MG 1.3*     Phos: No results for input(s): PHOS in the last 72 hours. TSH: No results for input(s): TSH in the last 72 hours. HgA1c: No results found for: LABA1C  No results found for: EAG  proBNP: No results for input(s): PROBNP in the last 72 hours. PT/INR: No results for input(s): PROTIME, INR in the last 72 hours. APTT:No results for input(s): APTT in the last 72 hours. CARDIAC ENZYMES:No results for input(s): CKTOTAL, CKMB, CKMBINDEX, TROPONINI in the last 72 hours. FASTING LIPID PANEL:No results found for: CHOL, HDL, LDLDIRECT, LDLCALC, TRIG  LIVER PROFILE:  Recent Labs     07/10/19  2333 07/12/19  1830   AST 29 23   ALT 14 12   LABALBU 3.9 3.5     Assessment and Recommendations to follow by Dr. Ramos. Electronically signed by EMETERIO Miles on 7/13/2019 at 9:23 AM     The above documentation has been prepared under my direction and personally reviewed by me in its entirety. I confirm that the note above accurately reflects all work, treatment, procedures, and medical decision making performed by me. The patient's history was independently obtained. The patient was independently examined.  Electrocardiogram, prior and present cardiovascular assessment, and laboratory studies were

## 2019-07-13 NOTE — PLAN OF CARE
Problem: Risk for Impaired Skin Integrity  Goal: Tissue integrity - skin and mucous membranes  Description  Structural intactness and normal physiological function of skin and  mucous membranes.   Outcome: Met This Shift     Problem: Falls - Risk of:  Goal: Will remain free from falls  Description  Will remain free from falls  Outcome: Met This Shift  Goal: Absence of physical injury  Description  Absence of physical injury  Outcome: Met This Shift     Problem: Nausea/Vomiting:  Goal: Absence of nausea/vomiting  Description  Absence of nausea/vomiting  Outcome: Met This Shift  Goal: Able to drink  Description  Able to drink  Outcome: Met This Shift  Goal: Able to eat  Description  Able to eat  Outcome: Met This Shift  Goal: Ability to achieve adequate nutritional intake will improve  Description  Ability to achieve adequate nutritional intake will improve  Outcome: Met This Shift     Problem: Pain:  Goal: Pain level will decrease  Description  Pain level will decrease  Outcome: Met This Shift  Goal: Control of acute pain  Description  Control of acute pain  Outcome: Met This Shift  Goal: Control of chronic pain  Description  Control of chronic pain  Outcome: Met This Shift

## 2019-07-13 NOTE — PLAN OF CARE
Problem: Inadequate oral food/beverage intake (NI-2.1)  Goal: Food and/or Nutrient Delivery  Description - Ensure BID  Individualized approach for food/nutrient provision.   Outcome: Met This Shift

## 2019-07-14 LAB
ANION GAP SERPL CALCULATED.3IONS-SCNC: 12 MMOL/L (ref 7–16)
BASOPHILS ABSOLUTE: 0.05 E9/L (ref 0–0.2)
BASOPHILS RELATIVE PERCENT: 0.4 % (ref 0–2)
BUN BLDV-MCNC: 10 MG/DL (ref 8–23)
CALCIUM SERPL-MCNC: 8.6 MG/DL (ref 8.6–10.2)
CHLORIDE BLD-SCNC: 105 MMOL/L (ref 98–107)
CO2: 22 MMOL/L (ref 22–29)
CREAT SERPL-MCNC: 0.6 MG/DL (ref 0.5–1)
EOSINOPHILS ABSOLUTE: 0.25 E9/L (ref 0.05–0.5)
EOSINOPHILS RELATIVE PERCENT: 1.9 % (ref 0–6)
GFR AFRICAN AMERICAN: >60
GFR NON-AFRICAN AMERICAN: >60 ML/MIN/1.73
GLUCOSE BLD-MCNC: 105 MG/DL (ref 74–99)
HCT VFR BLD CALC: 29.7 % (ref 34–48)
HEMOGLOBIN: 9.8 G/DL (ref 11.5–15.5)
IMMATURE GRANULOCYTES #: 0.22 E9/L
IMMATURE GRANULOCYTES %: 1.6 % (ref 0–5)
LV EF: 70 %
LVEF MODALITY: NORMAL
LYMPHOCYTES ABSOLUTE: 3.08 E9/L (ref 1.5–4)
LYMPHOCYTES RELATIVE PERCENT: 23.1 % (ref 20–42)
MCH RBC QN AUTO: 31.7 PG (ref 26–35)
MCHC RBC AUTO-ENTMCNC: 33 % (ref 32–34.5)
MCV RBC AUTO: 96.1 FL (ref 80–99.9)
MONOCYTES ABSOLUTE: 0.88 E9/L (ref 0.1–0.95)
MONOCYTES RELATIVE PERCENT: 6.6 % (ref 2–12)
MRSA CULTURE ONLY: NORMAL
NEUTROPHILS ABSOLUTE: 8.88 E9/L (ref 1.8–7.3)
NEUTROPHILS RELATIVE PERCENT: 66.4 % (ref 43–80)
PDW BLD-RTO: 13.1 FL (ref 11.5–15)
PLATELET # BLD: 192 E9/L (ref 130–450)
PMV BLD AUTO: 9.4 FL (ref 7–12)
POTASSIUM SERPL-SCNC: 3.3 MMOL/L (ref 3.5–5)
RBC # BLD: 3.09 E12/L (ref 3.5–5.5)
SODIUM BLD-SCNC: 139 MMOL/L (ref 132–146)
WBC # BLD: 13.4 E9/L (ref 4.5–11.5)

## 2019-07-14 PROCEDURE — 36415 COLL VENOUS BLD VENIPUNCTURE: CPT

## 2019-07-14 PROCEDURE — 2580000003 HC RX 258: Performed by: INTERNAL MEDICINE

## 2019-07-14 PROCEDURE — 85025 COMPLETE CBC W/AUTO DIFF WBC: CPT

## 2019-07-14 PROCEDURE — 2580000003 HC RX 258: Performed by: SURGERY

## 2019-07-14 PROCEDURE — 6370000000 HC RX 637 (ALT 250 FOR IP): Performed by: INTERNAL MEDICINE

## 2019-07-14 PROCEDURE — 2500000003 HC RX 250 WO HCPCS: Performed by: SURGERY

## 2019-07-14 PROCEDURE — 2060000000 HC ICU INTERMEDIATE R&B

## 2019-07-14 PROCEDURE — 93306 TTE W/DOPPLER COMPLETE: CPT

## 2019-07-14 PROCEDURE — 99232 SBSQ HOSP IP/OBS MODERATE 35: CPT | Performed by: INTERNAL MEDICINE

## 2019-07-14 PROCEDURE — 6370000000 HC RX 637 (ALT 250 FOR IP): Performed by: SURGERY

## 2019-07-14 PROCEDURE — 6360000002 HC RX W HCPCS: Performed by: INTERNAL MEDICINE

## 2019-07-14 PROCEDURE — 80048 BASIC METABOLIC PNL TOTAL CA: CPT

## 2019-07-14 PROCEDURE — 6360000002 HC RX W HCPCS: Performed by: SURGERY

## 2019-07-14 RX ORDER — PERPHENAZINE AND AMITRIPTYLINE HYDROCHLORIDE 4; 25 MG/1; MG/1
1 TABLET, FILM COATED ORAL DAILY
Status: DISCONTINUED | OUTPATIENT
Start: 2019-07-14 | End: 2019-07-14 | Stop reason: SDUPTHER

## 2019-07-14 RX ORDER — POTASSIUM CHLORIDE 7.45 MG/ML
10 INJECTION INTRAVENOUS ONCE
Status: COMPLETED | OUTPATIENT
Start: 2019-07-14 | End: 2019-07-14

## 2019-07-14 RX ORDER — AMITRIPTYLINE HYDROCHLORIDE 25 MG/1
25 TABLET, FILM COATED ORAL DAILY
Status: DISCONTINUED | OUTPATIENT
Start: 2019-07-14 | End: 2019-07-17 | Stop reason: HOSPADM

## 2019-07-14 RX ORDER — PERPHENAZINE 4 MG/1
4 TABLET, FILM COATED ORAL DAILY
Status: DISCONTINUED | OUTPATIENT
Start: 2019-07-14 | End: 2019-07-17 | Stop reason: HOSPADM

## 2019-07-14 RX ADMIN — HYDROCODONE BITARTRATE AND ACETAMINOPHEN 1 TABLET: 5; 325 TABLET ORAL at 10:24

## 2019-07-14 RX ADMIN — MECLIZINE 12.5 MG: 12.5 TABLET ORAL at 08:04

## 2019-07-14 RX ADMIN — POTASSIUM CHLORIDE 10 MEQ: 10 INJECTION, SOLUTION INTRAVENOUS at 06:14

## 2019-07-14 RX ADMIN — METRONIDAZOLE 500 MG: 500 INJECTION, SOLUTION INTRAVENOUS at 02:54

## 2019-07-14 RX ADMIN — Medication 10 ML: at 20:03

## 2019-07-14 RX ADMIN — METRONIDAZOLE 500 MG: 500 INJECTION, SOLUTION INTRAVENOUS at 10:24

## 2019-07-14 RX ADMIN — HYDROCODONE BITARTRATE AND ACETAMINOPHEN 1 TABLET: 5; 325 TABLET ORAL at 18:21

## 2019-07-14 RX ADMIN — HYDROCODONE BITARTRATE AND ACETAMINOPHEN 1 TABLET: 5; 325 TABLET ORAL at 00:34

## 2019-07-14 RX ADMIN — SODIUM CHLORIDE: 9 INJECTION, SOLUTION INTRAVENOUS at 04:09

## 2019-07-14 RX ADMIN — ONDANSETRON 4 MG: 2 INJECTION INTRAMUSCULAR; INTRAVENOUS at 14:00

## 2019-07-14 RX ADMIN — METOPROLOL TARTRATE 50 MG: 50 TABLET ORAL at 20:03

## 2019-07-14 RX ADMIN — Medication 10 ML: at 08:57

## 2019-07-14 RX ADMIN — METOPROLOL TARTRATE 50 MG: 50 TABLET ORAL at 08:56

## 2019-07-14 RX ADMIN — METRONIDAZOLE 500 MG: 500 INJECTION, SOLUTION INTRAVENOUS at 18:56

## 2019-07-14 RX ADMIN — ENOXAPARIN SODIUM 40 MG: 40 INJECTION SUBCUTANEOUS at 08:57

## 2019-07-14 RX ADMIN — PERPHENAZINE 4 MG: 4 TABLET, FILM COATED ORAL at 18:21

## 2019-07-14 RX ADMIN — POTASSIUM CHLORIDE 10 MEQ: 10 INJECTION, SOLUTION INTRAVENOUS at 11:36

## 2019-07-14 RX ADMIN — CEFTRIAXONE 1 G: 1 INJECTION, POWDER, FOR SOLUTION INTRAMUSCULAR; INTRAVENOUS at 18:21

## 2019-07-14 RX ADMIN — ONDANSETRON 4 MG: 2 INJECTION INTRAMUSCULAR; INTRAVENOUS at 08:04

## 2019-07-14 RX ADMIN — AMITRIPTYLINE HYDROCHLORIDE 25 MG: 25 TABLET, FILM COATED ORAL at 18:21

## 2019-07-14 ASSESSMENT — PAIN DESCRIPTION - ORIENTATION
ORIENTATION: RIGHT;LOWER

## 2019-07-14 ASSESSMENT — ENCOUNTER SYMPTOMS
DIARRHEA: 0
VOMITING: 0
SHORTNESS OF BREATH: 0
NAUSEA: 1

## 2019-07-14 ASSESSMENT — PAIN DESCRIPTION - ONSET
ONSET: ON-GOING

## 2019-07-14 ASSESSMENT — PAIN SCALES - GENERAL
PAINLEVEL_OUTOF10: 4
PAINLEVEL_OUTOF10: 6
PAINLEVEL_OUTOF10: 4
PAINLEVEL_OUTOF10: 5
PAINLEVEL_OUTOF10: 1
PAINLEVEL_OUTOF10: 0

## 2019-07-14 ASSESSMENT — PAIN DESCRIPTION - PAIN TYPE
TYPE: ACUTE PAIN

## 2019-07-14 ASSESSMENT — PAIN DESCRIPTION - DESCRIPTORS
DESCRIPTORS: TENDER;ACHING;SORE
DESCRIPTORS: ACHING;DISCOMFORT;SORE
DESCRIPTORS: ACHING;DISCOMFORT

## 2019-07-14 ASSESSMENT — PAIN DESCRIPTION - LOCATION
LOCATION: BUTTOCKS

## 2019-07-14 ASSESSMENT — PAIN DESCRIPTION - FREQUENCY
FREQUENCY: INTERMITTENT
FREQUENCY: CONTINUOUS
FREQUENCY: INTERMITTENT

## 2019-07-14 ASSESSMENT — PAIN DESCRIPTION - PROGRESSION
CLINICAL_PROGRESSION: GRADUALLY WORSENING
CLINICAL_PROGRESSION: NOT CHANGED
CLINICAL_PROGRESSION: NOT CHANGED

## 2019-07-14 ASSESSMENT — PAIN - FUNCTIONAL ASSESSMENT
PAIN_FUNCTIONAL_ASSESSMENT: ACTIVITIES ARE NOT PREVENTED
PAIN_FUNCTIONAL_ASSESSMENT: PREVENTS OR INTERFERES SOME ACTIVE ACTIVITIES AND ADLS
PAIN_FUNCTIONAL_ASSESSMENT: PREVENTS OR INTERFERES SOME ACTIVE ACTIVITIES AND ADLS
PAIN_FUNCTIONAL_ASSESSMENT: ACTIVITIES ARE NOT PREVENTED
PAIN_FUNCTIONAL_ASSESSMENT: PREVENTS OR INTERFERES SOME ACTIVE ACTIVITIES AND ADLS

## 2019-07-14 NOTE — PROGRESS NOTES
[address] [place] [address] [facility]  [facility]  Progress Note    SUBJECTIVE:  No chief complaint on file. Patient underwent bedside debridement of the gluteal abscess. No new complaints. Review of systems:  As stated above in the chief complaint, otherwise negative. Medications:  Scheduled Meds:   potassium chloride  40 mEq Oral Once    metoprolol tartrate  50 mg Oral BID    metoprolol tartrate  50 mg Oral Once    digoxin  250 mcg Oral Once    cefTRIAXone (ROCEPHIN) IV  1 g Intravenous Q24H    metroNIDAZOLE  500 mg Intravenous Q8H    sodium chloride flush  10 mL Intravenous 2 times per day    enoxaparin  40 mg Subcutaneous Daily    potassium chloride  40 mEq Oral BID     Continuous Infusions:    PRN Meds:perflutren lipid microspheres, promethazine, meclizine, povidone-iodine, hydrOXYzine, HYDROcodone 5 mg - acetaminophen, sodium chloride flush, magnesium hydroxide, ondansetron    OBJECTIVE:  BP (!) 143/71   Pulse 68   Temp 98.1 °F (36.7 °C) (Oral)   Resp 16   Ht 5' (1.524 m)   Wt 133 lb 6.4 oz (60.5 kg)   SpO2 95%   BMI 26.05 kg/m²   Temp  Av.2 °F (36.8 °C)  Min: 97.6 °F (36.4 °C)  Max: 98.8 °F (37.1 °C)  Constitutional: Patient is sitting on the commode. She is awake and in no distress. Skin: Warm and dry. HEENT: Eyes show round, and reactive pupils. No jaundice. Moist mucous membranes, no ulcerations, no thrush. Neck: Supple to movements. Chest: No wheezing, crackles, or rhonchi. Cardiovascular: S1 and S2 are rhythmic and regular. No murmurs appreciated. Abdomen: Positive bowel sounds to auscultation. Benign to palpation. Extremities: No edema.   Lines: peripheral    Laboratory and Tests Review:  Lab Results   Component Value Date    WBC 13.4 (H) 2019    WBC 21.5 (H) 2019    WBC 22.2 (H) 2019    HGB 9.8 (L) 2019    HCT 29.7 (L) 2019    MCV 96.1 2019     2019     Lab Results   Component Value Date    NEUTROABS 8.88

## 2019-07-14 NOTE — PROGRESS NOTES
Physical Therapy    Attempt physical therapy evaluation x2 this date. Following subjective history prior to lunch, pt requests return following lunch d/t pt increased nausea following activity and pt/family reports she hasn't been able to hold food down in days. Return following lunch and pt adamantly declines to participate in PT eval at this time d/t increased nausea. Education on importance of functional mobility to decrease pressure on abscess related to current admission. Pt continues to decline to participate. Pt wishes respected. Will check back at a later date. Thank you.      Bogdan Fraire, PT, DPT    [id number]

## 2019-07-15 LAB
ANAEROBIC CULTURE: NORMAL
ANION GAP SERPL CALCULATED.3IONS-SCNC: 11 MMOL/L (ref 7–16)
BASOPHILS ABSOLUTE: 0.04 E9/L (ref 0–0.2)
BASOPHILS RELATIVE PERCENT: 0.4 % (ref 0–2)
BUN BLDV-MCNC: 10 MG/DL (ref 8–23)
CALCIUM SERPL-MCNC: 8.9 MG/DL (ref 8.6–10.2)
CHLORIDE BLD-SCNC: 103 MMOL/L (ref 98–107)
CO2: 25 MMOL/L (ref 22–29)
CREAT SERPL-MCNC: 0.6 MG/DL (ref 0.5–1)
EKG ATRIAL RATE: 66 BPM
EKG P AXIS: 87 DEGREES
EKG P-R INTERVAL: 182 MS
EKG Q-T INTERVAL: 404 MS
EKG QRS DURATION: 72 MS
EKG QTC CALCULATION (BAZETT): 423 MS
EKG R AXIS: -10 DEGREES
EKG T AXIS: 32 DEGREES
EKG VENTRICULAR RATE: 66 BPM
EOSINOPHILS ABSOLUTE: 0.28 E9/L (ref 0.05–0.5)
EOSINOPHILS RELATIVE PERCENT: 3 % (ref 0–6)
GFR AFRICAN AMERICAN: >60
GFR NON-AFRICAN AMERICAN: >60 ML/MIN/1.73
GLUCOSE BLD-MCNC: 94 MG/DL (ref 74–99)
HCT VFR BLD CALC: 29.9 % (ref 34–48)
HEMOGLOBIN: 9.8 G/DL (ref 11.5–15.5)
IMMATURE GRANULOCYTES #: 0.41 E9/L
IMMATURE GRANULOCYTES %: 4.3 % (ref 0–5)
LYMPHOCYTES ABSOLUTE: 3.22 E9/L (ref 1.5–4)
LYMPHOCYTES RELATIVE PERCENT: 34.1 % (ref 20–42)
MAGNESIUM: 1.8 MG/DL (ref 1.6–2.6)
MCH RBC QN AUTO: 31.1 PG (ref 26–35)
MCHC RBC AUTO-ENTMCNC: 32.8 % (ref 32–34.5)
MCV RBC AUTO: 94.9 FL (ref 80–99.9)
MONOCYTES ABSOLUTE: 0.66 E9/L (ref 0.1–0.95)
MONOCYTES RELATIVE PERCENT: 7 % (ref 2–12)
NEUTROPHILS ABSOLUTE: 4.83 E9/L (ref 1.8–7.3)
NEUTROPHILS RELATIVE PERCENT: 51.2 % (ref 43–80)
ORGANISM: ABNORMAL
PDW BLD-RTO: 12.9 FL (ref 11.5–15)
PLATELET # BLD: 216 E9/L (ref 130–450)
PMV BLD AUTO: 9 FL (ref 7–12)
POTASSIUM SERPL-SCNC: 3.1 MMOL/L (ref 3.5–5)
RBC # BLD: 3.15 E12/L (ref 3.5–5.5)
SODIUM BLD-SCNC: 139 MMOL/L (ref 132–146)
WBC # BLD: 9.4 E9/L (ref 4.5–11.5)
WOUND/ABSCESS: ABNORMAL
WOUND/ABSCESS: ABNORMAL

## 2019-07-15 PROCEDURE — 97165 OT EVAL LOW COMPLEX 30 MIN: CPT

## 2019-07-15 PROCEDURE — 6370000000 HC RX 637 (ALT 250 FOR IP): Performed by: INTERNAL MEDICINE

## 2019-07-15 PROCEDURE — 85025 COMPLETE CBC W/AUTO DIFF WBC: CPT

## 2019-07-15 PROCEDURE — 2580000003 HC RX 258: Performed by: INTERNAL MEDICINE

## 2019-07-15 PROCEDURE — 2500000003 HC RX 250 WO HCPCS: Performed by: SURGERY

## 2019-07-15 PROCEDURE — 6370000000 HC RX 637 (ALT 250 FOR IP): Performed by: SURGERY

## 2019-07-15 PROCEDURE — 36415 COLL VENOUS BLD VENIPUNCTURE: CPT

## 2019-07-15 PROCEDURE — 97530 THERAPEUTIC ACTIVITIES: CPT

## 2019-07-15 PROCEDURE — 6370000000 HC RX 637 (ALT 250 FOR IP): Performed by: SPECIALIST

## 2019-07-15 PROCEDURE — 83735 ASSAY OF MAGNESIUM: CPT

## 2019-07-15 PROCEDURE — 80048 BASIC METABOLIC PNL TOTAL CA: CPT

## 2019-07-15 PROCEDURE — 97161 PT EVAL LOW COMPLEX 20 MIN: CPT

## 2019-07-15 PROCEDURE — 93010 ELECTROCARDIOGRAM REPORT: CPT | Performed by: INTERNAL MEDICINE

## 2019-07-15 PROCEDURE — 99232 SBSQ HOSP IP/OBS MODERATE 35: CPT | Performed by: INTERNAL MEDICINE

## 2019-07-15 PROCEDURE — 2060000000 HC ICU INTERMEDIATE R&B

## 2019-07-15 PROCEDURE — 6360000002 HC RX W HCPCS: Performed by: INTERNAL MEDICINE

## 2019-07-15 PROCEDURE — 97535 SELF CARE MNGMENT TRAINING: CPT

## 2019-07-15 RX ORDER — DOXYCYCLINE HYCLATE 100 MG/1
100 CAPSULE ORAL EVERY 12 HOURS SCHEDULED
Status: DISCONTINUED | OUTPATIENT
Start: 2019-07-15 | End: 2019-07-17 | Stop reason: HOSPADM

## 2019-07-15 RX ORDER — POTASSIUM CHLORIDE 7.45 MG/ML
10 INJECTION INTRAVENOUS ONCE
Status: COMPLETED | OUTPATIENT
Start: 2019-07-15 | End: 2019-07-15

## 2019-07-15 RX ORDER — POTASSIUM CHLORIDE 7.45 MG/ML
20 INJECTION INTRAVENOUS ONCE
Status: DISCONTINUED | OUTPATIENT
Start: 2019-07-15 | End: 2019-07-15

## 2019-07-15 RX ORDER — POTASSIUM CHLORIDE 20 MEQ/1
20 TABLET, EXTENDED RELEASE ORAL 2 TIMES DAILY WITH MEALS
Status: DISCONTINUED | OUTPATIENT
Start: 2019-07-15 | End: 2019-07-15

## 2019-07-15 RX ADMIN — DOXYCYCLINE HYCLATE 100 MG: 100 CAPSULE ORAL at 11:45

## 2019-07-15 RX ADMIN — Medication 10 ML: at 20:06

## 2019-07-15 RX ADMIN — Medication 10 ML: at 07:57

## 2019-07-15 RX ADMIN — POTASSIUM CHLORIDE 10 MEQ: 10 INJECTION, SOLUTION INTRAVENOUS at 12:01

## 2019-07-15 RX ADMIN — AMITRIPTYLINE HYDROCHLORIDE 25 MG: 25 TABLET, FILM COATED ORAL at 07:57

## 2019-07-15 RX ADMIN — METOPROLOL TARTRATE 50 MG: 50 TABLET ORAL at 07:56

## 2019-07-15 RX ADMIN — DOXYCYCLINE HYCLATE 100 MG: 100 CAPSULE ORAL at 20:05

## 2019-07-15 RX ADMIN — PERPHENAZINE 4 MG: 4 TABLET, FILM COATED ORAL at 07:56

## 2019-07-15 RX ADMIN — HYDROCODONE BITARTRATE AND ACETAMINOPHEN 1 TABLET: 5; 325 TABLET ORAL at 07:56

## 2019-07-15 RX ADMIN — HYDROCODONE BITARTRATE AND ACETAMINOPHEN 1 TABLET: 5; 325 TABLET ORAL at 22:17

## 2019-07-15 RX ADMIN — POTASSIUM CHLORIDE 10 MEQ: 10 INJECTION, SOLUTION INTRAVENOUS at 11:09

## 2019-07-15 RX ADMIN — METRONIDAZOLE 500 MG: 500 INJECTION, SOLUTION INTRAVENOUS at 10:09

## 2019-07-15 RX ADMIN — HYDROCODONE BITARTRATE AND ACETAMINOPHEN 1 TABLET: 5; 325 TABLET ORAL at 00:29

## 2019-07-15 RX ADMIN — METOPROLOL TARTRATE 50 MG: 50 TABLET ORAL at 20:05

## 2019-07-15 RX ADMIN — HYDROCODONE BITARTRATE AND ACETAMINOPHEN 1 TABLET: 5; 325 TABLET ORAL at 16:03

## 2019-07-15 RX ADMIN — METRONIDAZOLE 500 MG: 500 INJECTION, SOLUTION INTRAVENOUS at 02:00

## 2019-07-15 RX ADMIN — ENOXAPARIN SODIUM 40 MG: 40 INJECTION SUBCUTANEOUS at 08:00

## 2019-07-15 RX ADMIN — Medication 10 ML: at 02:00

## 2019-07-15 ASSESSMENT — PAIN SCALES - GENERAL
PAINLEVEL_OUTOF10: 4
PAINLEVEL_OUTOF10: 0
PAINLEVEL_OUTOF10: 9
PAINLEVEL_OUTOF10: 6
PAINLEVEL_OUTOF10: 0
PAINLEVEL_OUTOF10: 8
PAINLEVEL_OUTOF10: 6
PAINLEVEL_OUTOF10: 0
PAINLEVEL_OUTOF10: 6

## 2019-07-15 ASSESSMENT — PAIN DESCRIPTION - DESCRIPTORS
DESCRIPTORS: ACHING;DISCOMFORT
DESCRIPTORS: ACHING;DISCOMFORT
DESCRIPTORS: ACHING;DISCOMFORT;SORE
DESCRIPTORS: ACHING;DISCOMFORT
DESCRIPTORS: ACHING;DISCOMFORT

## 2019-07-15 ASSESSMENT — PAIN DESCRIPTION - ORIENTATION
ORIENTATION: LEFT;LOWER
ORIENTATION: RIGHT;LOWER
ORIENTATION: LEFT
ORIENTATION: LEFT
ORIENTATION: LEFT;LOWER

## 2019-07-15 ASSESSMENT — PAIN DESCRIPTION - ONSET
ONSET: ON-GOING

## 2019-07-15 ASSESSMENT — PAIN DESCRIPTION - LOCATION
LOCATION: BUTTOCKS

## 2019-07-15 ASSESSMENT — PAIN DESCRIPTION - PAIN TYPE
TYPE: ACUTE PAIN

## 2019-07-15 ASSESSMENT — PAIN DESCRIPTION - PROGRESSION
CLINICAL_PROGRESSION: NOT CHANGED

## 2019-07-15 ASSESSMENT — PAIN DESCRIPTION - FREQUENCY
FREQUENCY: INTERMITTENT

## 2019-07-15 NOTE — PROGRESS NOTES
GENERAL SURGERY  DAILY PROGRESS NOTE  7/15/2019    CC: buttock abscess    Subjective:  No events overnight. No n/v. Pain only near buttock    Objective:  /60   Pulse 62   Temp 98.2 °F (36.8 °C) (Oral)   Resp 16   Ht 5' (1.524 m)   Wt 133 lb 6.4 oz (60.5 kg)   SpO2 94%   BMI 26.05 kg/m²     General appearance: alert, cooperative and in no acute distress. Eyes: grossly normal  Lungs: nonlabored breathing   Heart: regular rate  Abdomen: soft, non-tender, non distended  Skin: gluteal abscess packed with purulent drainage still, mild cellulitis but no signficant induration    Recent Labs     07/15/19  0310 07/14/19  0556 07/13/19  0125   WBC 9.4 13.4* 21.5*   HGB 9.8* 9.8* 11.2*   HCT 29.9* 29.7* 33.7*   MCV 94.9 96.1 94.1    192 215     Assessment/Plan:  80 y.o. female with right buttock abscess s/p bedside I&D 7/13    Pain control  Continue local wound care with gauze packing to wound  Will order home care if needed, pending PT eval  Defer antibiotics to ID.     Electronically signed by Joie Mathis MD on 7/15/2019 at 9:21 AM

## 2019-07-15 NOTE — PROGRESS NOTES
demonstrated; patient used objects nearby to stabilize her balance. Mod I / Independent - with use of device, as needed/appropriate   Balance Sitting: Good  (at EOB)  Standing: Fair-  (without device)  Fair+ dynamic standing balance during completion of ADLs and other functional tasks. Activity Tolerance Fair-  Patient will demonstrate Good understanding and consistent implementation of energy conservation techniques and work simplification techniques into ADL/IADL routines. Visual/  Perceptual WFL     N/A        Strength: ROM: Additional Information:    R UE  4/5  WFL    L UE 4/5  WFL      Hearing: WFL  Sensation: No complaints of numbness/tingling in B UEs. Tone: WFL  Edema: No    Comments/Treatment: Upon arrival, patient seated on toilet in bathroom. At end of session, patient supine in bed (per nursing request) with call light and phone within reach, nursing present, and all lines and tubes intact. Patient would benefit from continued skilled OT to increase safety and independence with completion of ADL/IADL tasks for functional independence and quality of life. Patient education provided regardin) safe transfer techniques, 2) call light use. Patient verbalized understanding. Eval Complexity: Low    Assessment of Current Deficits:   Functional Mobility ? ADLs ? Strength ? Cognition ? Functional Transfers  ? IADLs ? Safety Awareness ? Endurance ? Fine Motor Coordination ? Balance ? Vision/Perception ? Sensation ? Gross Motor Coordination ? ROM ? Delirium ? Motor Control ? Plan of Care:   ADL Retraining ? Equipment Needs ? Neuromuscular Re-Education ? Energy Conservation Techniques ? Functional Transfer Training ? Patient and/or Family Education ? Functional Mobility Training ? Environmental Modifications ? Cognitive Re-Training ? Compensatory Techniques for ADLs ? Splinting Needs ? Positioning to Improve Overall Function ? Therapeutic Activity ?    Therapeutic

## 2019-07-15 NOTE — PROGRESS NOTES
microspheres (DEFINITY) injection 1.65 mg  1.5 mL Intravenous ONCE PRN Benemonica Joyce APRN - CNS        promethazine (PHENERGAN) tablet 12.5 mg  12.5 mg Oral Q6H PRN Cassandra Disla, DO   12.5 mg at 07/13/19 1155    meclizine (ANTIVERT) tablet 12.5 mg  12.5 mg Oral TID PRN Cassandra Disla, DO   12.5 mg at 07/14/19 0804    povidone-iodine (BETADINE) 10 % external solution   Topical PRN Errol Harper MD        hydrOXYzine (VISTARIL) capsule 25 mg  25 mg Oral BID PRN Cassandra Disla, DO   25 mg at 07/13/19 1737    cefTRIAXone (ROCEPHIN) 1 g in dextrose 5 % 50 mL IVPB (vial-mate)  1 g Intravenous Q24H Errol Harper MD   Stopped at 07/14/19 1856    metronidazole (FLAGYL) 500 mg in NaCl 100 mL IVPB premix  500 mg Intravenous Q8H Errol Harper MD   Stopped at 07/15/19 0312    HYDROcodone-acetaminophen (NORCO) 5-325 MG per tablet 1 tablet  1 tablet Oral Q6H PRN Errol Harper MD   1 tablet at 07/15/19 0756    sodium chloride flush 0.9 % injection 10 mL  10 mL Intravenous 2 times per day Lyndia Lax, DO   10 mL at 07/15/19 0757    sodium chloride flush 0.9 % injection 10 mL  10 mL Intravenous PRN Lyndia Lax, DO   10 mL at 07/15/19 0200    magnesium hydroxide (MILK OF MAGNESIA) 400 MG/5ML suspension 30 mL  30 mL Oral Daily PRN Lyndia Lax, DO        ondansetron [facility]) injection 4 mg  4 mg Intravenous Q6H PRN Lyndia Lax, DO   4 mg at 07/14/19 1400    enoxaparin (LOVENOX) injection 40 mg  40 mg Subcutaneous Daily Lyndia Lax, DO   40 mg at 07/15/19 0800         Physical Exam:  /60   Pulse 62   Temp 98.2 °F (36.8 °C) (Oral)   Resp 16   Ht 5' (1.524 m)   Wt 133 lb 6.4 oz (60.5 kg)   SpO2 94%   BMI 26.05 kg/m²   Wt Readings from Last 3 Encounters:   07/14/19 133 lb 6.4 oz (60.5 kg)   07/10/19 116 lb (52.6 kg)     Appearance: Awake, alert, no acute respiratory distress  Skin: Intact, no rash  Head: Normocephalic, atraumatic  Eyes: EOMI, no conjunctival erythema  ENMT: No pharyngeal erythema, MMM, no rhinorrhea  Neck: Supple, no elevated JVP, no carotid bruits  Lungs: Clear to auscultation bilaterally. No wheezes, rales, or rhonchi. Cardiac: Regular rate and rhythm, +S1S2, no murmurs apparent  Abdomen: Soft, nontender, +bowel sounds  Extremities: Moves all extremities x 4, no lower extremity edema  Neurologic: No focal motor deficits apparent, normal mood and affect  Peripheral Pulses: Intact posterior tibial pulses bilaterally    Intake/Output:    Intake/Output Summary (Last 24 hours) at 7/15/2019 0921  Last data filed at 7/15/2019 0537  Gross per 24 hour   Intake 490 ml   Output 200 ml   Net 290 ml     No intake/output data recorded. Laboratory Tests:  Recent Labs     07/13/19  0707 07/14/19  0414 07/15/19  0310    139 139   K 3.3* 3.3* 3.1*    105 103   CO2 22 22 25   BUN 13 10 10   CREATININE 0.6 0.6 0.6   GLUCOSE 117* 105* 94   CALCIUM 9.0 8.6 8.9     Lab Results   Component Value Date    MG 2.0 07/13/2019     Recent Labs     07/12/19  1830   ALKPHOS 61   ALT 12   AST 23   PROT 6.9   BILITOT 0.5   LABALBU 3.5     Recent Labs     07/13/19  0125 07/14/19  0556 07/15/19  0310   WBC 21.5* 13.4* 9.4   RBC 3.58 3.09* 3.15*   HGB 11.2* 9.8* 9.8*   HCT 33.7* 29.7* 29.9*   MCV 94.1 96.1 94.9   MCH 31.3 31.7 31.1   MCHC 33.2 33.0 32.8   RDW 12.7 13.1 12.9    192 216   MPV 9.2 9.4 9.0     No results found for: CKTOTAL, CKMB, CKMBINDEX, TROPONINI  No results found for: INR, PROTIME  No results found for: TSHFT4, TSH  No results found for: LABA1C  No results found for: EAG  No results found for: CHOL  No results found for: TRIG  No results found for: HDL  No results found for: LDLCALC, LDLCHOLESTEROL  No results found for: LABVLDL, VLDL  No results found for: CHOLHDLRATIO    Cardiac Tests:  Telemetry findings reviewed: NSR with heart rate in the 60s and had another episode of non sustained SVT this morning around 8 AM which felt.   She also had several episodes of non sustained SVT      Chest X-ray:       Echocardiogram - 7/12/2019:   Left ventricular internal dimensions were normal in diastole and systole. EF 70%,diastolic dysfunction grade 1   No regional wall motion abnormalities seen.   Normal left ventricular ejection fraction.   There is doppler evidence of stage I diastolic dysfunction.   Focal calcification mitral valve leaflets.   Mild mitral annular calcification.   The aortic valve appears mildly sclerotic.       Stress test:        Cardiac catheterization:     Labs reviewed and K is still low at 3.1, H/H 9.8/29.9    Vitals: /60    ASSESSMENT:  · Recurrent episodes of non sustained SVT, normal LV function. K still low. On metoprololol  · Hypertension well controlled   · Gluteal abscess - s/p S&D - on abx improving. · Depression -stable    Plan:   · Continue metoprolol for hypertension and SVT. · Correct potassium and keep K level between 4-5 meq,  Check mag level, if SVT persists after correcting the K level then we can considering increasing the metoprolol dose. · Continue Abx as per ID service. Winston Adorno MD., [facility].   [facility]) Cardiology

## 2019-07-16 LAB
ANION GAP SERPL CALCULATED.3IONS-SCNC: 11 MMOL/L (ref 7–16)
BASOPHILS ABSOLUTE: 0.1 E9/L (ref 0–0.2)
BASOPHILS RELATIVE PERCENT: 0.9 % (ref 0–2)
BUN BLDV-MCNC: 11 MG/DL (ref 8–23)
CALCIUM SERPL-MCNC: 8.9 MG/DL (ref 8.6–10.2)
CHLORIDE BLD-SCNC: 101 MMOL/L (ref 98–107)
CO2: 29 MMOL/L (ref 22–29)
CREAT SERPL-MCNC: 0.6 MG/DL (ref 0.5–1)
EOSINOPHILS ABSOLUTE: 0.85 E9/L (ref 0.05–0.5)
EOSINOPHILS RELATIVE PERCENT: 7.8 % (ref 0–6)
GFR AFRICAN AMERICAN: >60
GFR NON-AFRICAN AMERICAN: >60 ML/MIN/1.73
GLUCOSE BLD-MCNC: 108 MG/DL (ref 74–99)
HCT VFR BLD CALC: 33.9 % (ref 34–48)
HEMOGLOBIN: 11.2 G/DL (ref 11.5–15.5)
LYMPHOCYTES ABSOLUTE: 3.05 E9/L (ref 1.5–4)
LYMPHOCYTES RELATIVE PERCENT: 27.8 % (ref 20–42)
MCH RBC QN AUTO: 32 PG (ref 26–35)
MCHC RBC AUTO-ENTMCNC: 33 % (ref 32–34.5)
MCV RBC AUTO: 96.9 FL (ref 80–99.9)
METAMYELOCYTES RELATIVE PERCENT: 0.9 % (ref 0–1)
MONOCYTES ABSOLUTE: 0.44 E9/L (ref 0.1–0.95)
MONOCYTES RELATIVE PERCENT: 4.3 % (ref 2–12)
MYELOCYTE PERCENT: 2.6 % (ref 0–0)
NEUTROPHILS ABSOLUTE: 6.43 E9/L (ref 1.8–7.3)
NEUTROPHILS RELATIVE PERCENT: 55.7 % (ref 43–80)
NUCLEATED RED BLOOD CELLS: 0.9 /100 WBC
PDW BLD-RTO: 13 FL (ref 11.5–15)
PLATELET # BLD: 274 E9/L (ref 130–450)
PMV BLD AUTO: 9 FL (ref 7–12)
POTASSIUM SERPL-SCNC: 3.3 MMOL/L (ref 3.5–5)
RBC # BLD: 3.5 E12/L (ref 3.5–5.5)
SODIUM BLD-SCNC: 141 MMOL/L (ref 132–146)
WBC # BLD: 10.9 E9/L (ref 4.5–11.5)

## 2019-07-16 PROCEDURE — 99232 SBSQ HOSP IP/OBS MODERATE 35: CPT | Performed by: INTERNAL MEDICINE

## 2019-07-16 PROCEDURE — 6370000000 HC RX 637 (ALT 250 FOR IP): Performed by: SURGERY

## 2019-07-16 PROCEDURE — 2580000003 HC RX 258: Performed by: INTERNAL MEDICINE

## 2019-07-16 PROCEDURE — 6370000000 HC RX 637 (ALT 250 FOR IP): Performed by: INTERNAL MEDICINE

## 2019-07-16 PROCEDURE — 36415 COLL VENOUS BLD VENIPUNCTURE: CPT

## 2019-07-16 PROCEDURE — 6370000000 HC RX 637 (ALT 250 FOR IP): Performed by: SPECIALIST

## 2019-07-16 PROCEDURE — 2060000000 HC ICU INTERMEDIATE R&B

## 2019-07-16 PROCEDURE — 80048 BASIC METABOLIC PNL TOTAL CA: CPT

## 2019-07-16 PROCEDURE — 6360000002 HC RX W HCPCS: Performed by: INTERNAL MEDICINE

## 2019-07-16 PROCEDURE — 85025 COMPLETE CBC W/AUTO DIFF WBC: CPT

## 2019-07-16 RX ORDER — HYDROCODONE BITARTRATE AND ACETAMINOPHEN 5; 325 MG/1; MG/1
1 TABLET ORAL EVERY 6 HOURS PRN
Qty: 20 TABLET | Refills: 0 | Status: SHIPPED | OUTPATIENT
Start: 2019-07-16 | End: 2019-07-21

## 2019-07-16 RX ORDER — HYDROXYZINE PAMOATE 25 MG/1
25 CAPSULE ORAL 2 TIMES DAILY PRN
Qty: 30 CAPSULE | Refills: 0 | Status: SHIPPED | OUTPATIENT
Start: 2019-07-16 | End: 2019-07-30

## 2019-07-16 RX ORDER — DOXYCYCLINE HYCLATE 100 MG/1
100 CAPSULE ORAL EVERY 12 HOURS SCHEDULED
Qty: 24 CAPSULE | Refills: 0 | Status: SHIPPED | OUTPATIENT
Start: 2019-07-16 | End: 2019-07-28

## 2019-07-16 RX ORDER — MECLIZINE HCL 12.5 MG/1
12.5 TABLET ORAL 3 TIMES DAILY PRN
Qty: 30 TABLET | Refills: 0 | Status: SHIPPED | OUTPATIENT
Start: 2019-07-16 | End: 2019-07-26

## 2019-07-16 RX ADMIN — AMITRIPTYLINE HYDROCHLORIDE 25 MG: 25 TABLET, FILM COATED ORAL at 08:43

## 2019-07-16 RX ADMIN — PERPHENAZINE 4 MG: 4 TABLET, FILM COATED ORAL at 08:43

## 2019-07-16 RX ADMIN — HYDROCODONE BITARTRATE AND ACETAMINOPHEN 1 TABLET: 5; 325 TABLET ORAL at 08:49

## 2019-07-16 RX ADMIN — Medication 10 ML: at 08:43

## 2019-07-16 RX ADMIN — Medication 10 ML: at 21:23

## 2019-07-16 RX ADMIN — METOPROLOL TARTRATE 50 MG: 50 TABLET ORAL at 08:43

## 2019-07-16 RX ADMIN — HYDROCODONE BITARTRATE AND ACETAMINOPHEN 1 TABLET: 5; 325 TABLET ORAL at 21:15

## 2019-07-16 RX ADMIN — ENOXAPARIN SODIUM 40 MG: 40 INJECTION SUBCUTANEOUS at 08:43

## 2019-07-16 RX ADMIN — DOXYCYCLINE HYCLATE 100 MG: 100 CAPSULE ORAL at 08:43

## 2019-07-16 RX ADMIN — DOXYCYCLINE HYCLATE 100 MG: 100 CAPSULE ORAL at 21:15

## 2019-07-16 RX ADMIN — ONDANSETRON 4 MG: 2 INJECTION INTRAMUSCULAR; INTRAVENOUS at 22:21

## 2019-07-16 RX ADMIN — METOPROLOL TARTRATE 75 MG: 50 TABLET ORAL at 21:16

## 2019-07-16 ASSESSMENT — PAIN SCALES - GENERAL
PAINLEVEL_OUTOF10: 6
PAINLEVEL_OUTOF10: 0
PAINLEVEL_OUTOF10: 8
PAINLEVEL_OUTOF10: 6
PAINLEVEL_OUTOF10: 8
PAINLEVEL_OUTOF10: 0

## 2019-07-16 ASSESSMENT — PAIN DESCRIPTION - LOCATION
LOCATION: BUTTOCKS
LOCATION: BUTTOCKS

## 2019-07-16 ASSESSMENT — PAIN DESCRIPTION - PAIN TYPE
TYPE: SURGICAL PAIN
TYPE: SURGICAL PAIN

## 2019-07-16 ASSESSMENT — PAIN DESCRIPTION - DESCRIPTORS
DESCRIPTORS: ACHING;BURNING
DESCRIPTORS: ACHING;BURNING

## 2019-07-16 ASSESSMENT — PAIN DESCRIPTION - ORIENTATION
ORIENTATION: LEFT
ORIENTATION: LEFT

## 2019-07-16 ASSESSMENT — PAIN DESCRIPTION - FREQUENCY: FREQUENCY: INTERMITTENT

## 2019-07-16 ASSESSMENT — PAIN DESCRIPTION - ONSET: ONSET: ON-GOING

## 2019-07-16 NOTE — PROGRESS NOTES
NEUTROABS 6.43 07/16/2019    NEUTROABS 4.83 07/15/2019    NEUTROABS 8.88 (H) 07/14/2019     Lab Results   Component Value Date    CRP 28.9 (H) 07/12/2019     No results found for: CRPHS  No results found for: SEDRATE  Lab Results   Component Value Date    ALT 12 07/12/2019    AST 23 07/12/2019    ALKPHOS 61 07/12/2019    BILITOT 0.5 07/12/2019     Lab Results   Component Value Date     07/16/2019    K 3.3 07/16/2019    K 3.1 07/10/2019     07/16/2019    CO2 29 07/16/2019    BUN 11 07/16/2019    CREATININE 0.6 07/16/2019    CREATININE 0.6 07/15/2019    CREATININE 0.6 07/14/2019    GFRAA >60 07/16/2019    LABGLOM >60 07/16/2019    GLUCOSE 108 07/16/2019    PROT 6.9 07/12/2019    LABALBU 3.5 07/12/2019    CALCIUM 8.9 07/16/2019    BILITOT 0.5 07/12/2019    ALKPHOS 61 07/12/2019    AST 23 07/12/2019    ALT 12 07/12/2019     Radiology:      Microbiology:   Abscess culture MRSA    ASSESSMENT:  · Left perirectal abscess secondary to MRSA. 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   Reconciled  · The patient can be discharged from [address]  12:40 PM  7/16/2019

## 2019-07-16 NOTE — PROGRESS NOTES
Deysi Garrett MD        perflutren lipid microspheres (DEFINITY) injection 1.65 mg  1.5 mL Intravenous ONCE PRN Moi Fiddler, APRN - CNS        promethazine (PHENERGAN) tablet 12.5 mg  12.5 mg Oral Q6H PRN Cassandra Disla, DO   12.5 mg at 07/13/19 1155    meclizine (ANTIVERT) tablet 12.5 mg  12.5 mg Oral TID PRN Cassandra Disla, DO   12.5 mg at 07/14/19 0804    povidone-iodine (BETADINE) 10 % external solution   Topical PRN Jes Hahn MD        hydrOXYzine (VISTARIL) capsule 25 mg  25 mg Oral BID PRN Cassandra Disla, DO   25 mg at 07/13/19 1737    HYDROcodone-acetaminophen (NORCO) 5-325 MG per tablet 1 tablet  1 tablet Oral Q6H PRN Jes Hahn MD   1 tablet at 07/16/19 0849    sodium chloride flush 0.9 % injection 10 mL  10 mL Intravenous 2 times per day May Orlando, DO   10 mL at 07/16/19 0843    sodium chloride flush 0.9 % injection 10 mL  10 mL Intravenous PRN May Orlando, DO   10 mL at 07/15/19 0200    magnesium hydroxide (MILK OF MAGNESIA) 400 MG/5ML suspension 30 mL  30 mL Oral Daily PRN May Orlando, DO        ondansetron [facility]) injection 4 mg  4 mg Intravenous Q6H PRN May Orlando, DO   4 mg at 07/14/19 1400    enoxaparin (LOVENOX) injection 40 mg  40 mg Subcutaneous Daily May Perry, DO   40 mg at 07/16/19 6231         Physical Exam:  BP (!) 140/74   Pulse 82   Temp 98.6 °F (37 °C) (Oral)   Resp 16   Ht 5' (1.524 m)   Wt 134 lb 3.2 oz (60.9 kg)   SpO2 94%   BMI 26.21 kg/m²   Wt Readings from Last 3 Encounters:   07/16/19 134 lb 3.2 oz (60.9 kg)   07/10/19 116 lb (52.6 kg)     Appearance: Awake, alert, no acute respiratory distress  Skin: Intact, no rash  Head: Normocephalic, atraumatic  Eyes: EOMI, no conjunctival erythema  ENMT: No pharyngeal erythema, MMM, no rhinorrhea  Neck: Supple, no elevated JVP, no carotid bruits  Lungs: Clear to auscultation bilaterally. No wheezes, rales, or rhonchi.   Cardiac: Regular rate and rhythm, +S1S2, no murmurs apparent  Abdomen: Soft, nontender, +bowel sounds  Extremities: Moves all extremities x 4, no lower extremity edema  Neurologic: No focal motor deficits apparent, normal mood and affect  Peripheral Pulses: Intact posterior tibial pulses bilaterally    Intake/Output:    Intake/Output Summary (Last 24 hours) at 7/16/2019 0925  Last data filed at 7/16/2019 0403  Gross per 24 hour   Intake 0 ml   Output 600 ml   Net -600 ml     No intake/output data recorded. Laboratory Tests:  Recent Labs     07/14/19  0414 07/15/19  0310    139   K 3.3* 3.1*    103   CO2 22 25   BUN 10 10   CREATININE 0.6 0.6   GLUCOSE 105* 94   CALCIUM 8.6 8.9     Lab Results   Component Value Date    MG 1.8 07/15/2019     No results for input(s): ALKPHOS, ALT, AST, PROT, BILITOT, BILIDIR, LABALBU in the last 72 hours. Recent Labs     07/14/19  0556 07/15/19  0310 07/16/19  0323   WBC 13.4* 9.4 10.9   RBC 3.09* 3.15* 3.50   HGB 9.8* 9.8* 11.2*   HCT 29.7* 29.9* 33.9*   MCV 96.1 94.9 96.9   MCH 31.7 31.1 32.0   MCHC 33.0 32.8 33.0   RDW 13.1 12.9 13.0    216 274   MPV 9.4 9.0 9.0     No results found for: CKTOTAL, CKMB, CKMBINDEX, TROPONINI  No results found for: INR, PROTIME  No results found for: TSHFT4, TSH  No results found for: LABA1C  No results found for: EAG  No results found for: CHOL  No results found for: TRIG  No results found for: HDL  No results found for: LDLCALC, LDLCHOLESTEROL  No results found for: LABVLDL, VLDL  No results found for: CHOLHDLRATIO    Cardiac Tests:  Telemetry findings reviewed: NSR with heart rate in the 60s and had another episode of non sustained SVT       Chest X-ray:       Echocardiogram - 7/12/2019:   Left ventricular internal dimensions were normal in diastole and systole.  EF 70%,diastolic dysfunction grade 1   No regional wall motion abnormalities seen.   Normal left ventricular ejection fraction.   There is doppler evidence of stage I diastolic dysfunction.   Focal calcification mitral valve leaflets.   Mild mitral annular calcification.   The aortic valve appears mildly sclerotic.       Stress test:        Cardiac catheterization:     Labs reviewed and K is still low at 3.1, H/H 9.8/29.9>>11.2/33.9    Vitals: /60>>140/74    ASSESSMENT:  · Recurrent episodes of non sustained SVT, normal LV function. K still low. On metoprololol  · Hypertension well controlled   · Gluteal abscess - s/p S&D - on abx improving. · Depression -stable    Plan:   · Continue metoprolol for hypertension and SVT and still having episodes of SVT,  Will increase metoprolol to 75 mg po bid  · Correct potassium and keep K level between 4-5 meq,  Will BMP now to make sure K was corrected. · Continue Abx as per ID service. · If she is discharged home then follow up with Dr. Terrye Carrel in couple of weeks. Sarahy Flaherty MD., [facility].   [address] [facility]

## 2019-07-16 NOTE — PROGRESS NOTES
Paged Dr. Mallory Booth regarding pt concerned about dressing change needing to be done every time she uses the restroom and her discharge

## 2019-07-16 NOTE — PLAN OF CARE
Problem: Falls - Risk of:  Goal: Will remain free from falls  Description  Will remain free from falls  Outcome: Met This Shift  Goal: Absence of physical injury  Description  Absence of physical injury  Outcome: Met This Shift     Problem: Nausea/Vomiting:  Goal: Able to drink  Description  Able to drink  Outcome: Met This Shift  Goal: Able to eat  Description  Able to eat  Outcome: Met This Shift  Goal: Ability to achieve adequate nutritional intake will improve  Description  Ability to achieve adequate nutritional intake will improve  Outcome: Met This Shift     Problem: Pain:  Goal: Control of acute pain  Description  Control of acute pain  Outcome: Met This Shift  Goal: Control of chronic pain  Description  Control of chronic pain  Outcome: Met This Shift

## 2019-07-17 VITALS
DIASTOLIC BLOOD PRESSURE: 74 MMHG | HEART RATE: 78 BPM | OXYGEN SATURATION: 96 % | BODY MASS INDEX: 25.82 KG/M2 | HEIGHT: 60 IN | WEIGHT: 131.5 LBS | TEMPERATURE: 98 F | RESPIRATION RATE: 16 BRPM | SYSTOLIC BLOOD PRESSURE: 156 MMHG

## 2019-07-17 LAB
BASOPHILS ABSOLUTE: 0 E9/L (ref 0–0.2)
BASOPHILS RELATIVE PERCENT: 0 % (ref 0–2)
BLOOD CULTURE, ROUTINE: NORMAL
CULTURE, BLOOD 2: NORMAL
EOSINOPHILS ABSOLUTE: 0.51 E9/L (ref 0.05–0.5)
EOSINOPHILS RELATIVE PERCENT: 4.3 % (ref 0–6)
HCT VFR BLD CALC: 32.5 % (ref 34–48)
HEMOGLOBIN: 10.5 G/DL (ref 11.5–15.5)
LYMPHOCYTES ABSOLUTE: 3.09 E9/L (ref 1.5–4)
LYMPHOCYTES RELATIVE PERCENT: 26.1 % (ref 20–42)
MCH RBC QN AUTO: 30.9 PG (ref 26–35)
MCHC RBC AUTO-ENTMCNC: 32.3 % (ref 32–34.5)
MCV RBC AUTO: 95.6 FL (ref 80–99.9)
METAMYELOCYTES RELATIVE PERCENT: 0.9 % (ref 0–1)
MONOCYTES ABSOLUTE: 0.48 E9/L (ref 0.1–0.95)
MONOCYTES RELATIVE PERCENT: 3.5 % (ref 2–12)
MYELOCYTE PERCENT: 0.9 % (ref 0–0)
NEUTROPHILS ABSOLUTE: 7.85 E9/L (ref 1.8–7.3)
NEUTROPHILS RELATIVE PERCENT: 64.3 % (ref 43–80)
NUCLEATED RED BLOOD CELLS: 0.9 /100 WBC
PDW BLD-RTO: 13.1 FL (ref 11.5–15)
PLATELET # BLD: 265 E9/L (ref 130–450)
PMV BLD AUTO: 9 FL (ref 7–12)
RBC # BLD: 3.4 E12/L (ref 3.5–5.5)
WBC # BLD: 11.9 E9/L (ref 4.5–11.5)

## 2019-07-17 PROCEDURE — 6370000000 HC RX 637 (ALT 250 FOR IP): Performed by: SPECIALIST

## 2019-07-17 PROCEDURE — 6360000002 HC RX W HCPCS: Performed by: INTERNAL MEDICINE

## 2019-07-17 PROCEDURE — 2580000003 HC RX 258: Performed by: INTERNAL MEDICINE

## 2019-07-17 PROCEDURE — 97535 SELF CARE MNGMENT TRAINING: CPT

## 2019-07-17 PROCEDURE — 6370000000 HC RX 637 (ALT 250 FOR IP): Performed by: INTERNAL MEDICINE

## 2019-07-17 PROCEDURE — 85025 COMPLETE CBC W/AUTO DIFF WBC: CPT

## 2019-07-17 PROCEDURE — 99232 SBSQ HOSP IP/OBS MODERATE 35: CPT | Performed by: INTERNAL MEDICINE

## 2019-07-17 PROCEDURE — 6370000000 HC RX 637 (ALT 250 FOR IP): Performed by: SURGERY

## 2019-07-17 PROCEDURE — 97530 THERAPEUTIC ACTIVITIES: CPT

## 2019-07-17 PROCEDURE — 36415 COLL VENOUS BLD VENIPUNCTURE: CPT

## 2019-07-17 RX ORDER — AMITRIPTYLINE HYDROCHLORIDE 25 MG/1
25 TABLET, FILM COATED ORAL DAILY
Qty: 30 TABLET | Refills: 3 | Status: SHIPPED | OUTPATIENT
Start: 2019-07-17

## 2019-07-17 RX ORDER — METOPROLOL TARTRATE 75 MG/1
75 TABLET, FILM COATED ORAL 2 TIMES DAILY
Qty: 60 TABLET | Refills: 3 | Status: SHIPPED | OUTPATIENT
Start: 2019-07-17 | End: 2019-09-11 | Stop reason: CLARIF

## 2019-07-17 RX ORDER — HYDROCHLOROTHIAZIDE 25 MG/1
25 TABLET ORAL DAILY
Status: DISCONTINUED | OUTPATIENT
Start: 2019-07-17 | End: 2019-07-17 | Stop reason: HOSPADM

## 2019-07-17 RX ORDER — POTASSIUM CHLORIDE 750 MG/1
10 TABLET, EXTENDED RELEASE ORAL DAILY
Status: DISCONTINUED | OUTPATIENT
Start: 2019-07-17 | End: 2019-07-17 | Stop reason: HOSPADM

## 2019-07-17 RX ORDER — HYDROCHLOROTHIAZIDE 25 MG/1
25 TABLET ORAL DAILY
Qty: 30 TABLET | Refills: 3 | Status: SHIPPED | OUTPATIENT
Start: 2019-07-17

## 2019-07-17 RX ADMIN — HYDROCODONE BITARTRATE AND ACETAMINOPHEN 1 TABLET: 5; 325 TABLET ORAL at 10:40

## 2019-07-17 RX ADMIN — Medication 10 ML: at 08:54

## 2019-07-17 RX ADMIN — POTASSIUM CHLORIDE 10 MEQ: 10 TABLET, EXTENDED RELEASE ORAL at 10:40

## 2019-07-17 RX ADMIN — METOPROLOL TARTRATE 75 MG: 50 TABLET ORAL at 08:54

## 2019-07-17 RX ADMIN — PERPHENAZINE 4 MG: 4 TABLET, FILM COATED ORAL at 08:57

## 2019-07-17 RX ADMIN — HYDROCHLOROTHIAZIDE 25 MG: 25 TABLET ORAL at 08:54

## 2019-07-17 RX ADMIN — DOXYCYCLINE HYCLATE 100 MG: 100 CAPSULE ORAL at 08:54

## 2019-07-17 RX ADMIN — AMITRIPTYLINE HYDROCHLORIDE 25 MG: 25 TABLET, FILM COATED ORAL at 08:57

## 2019-07-17 RX ADMIN — ENOXAPARIN SODIUM 40 MG: 40 INJECTION SUBCUTANEOUS at 08:54

## 2019-07-17 RX ADMIN — HYDROCODONE BITARTRATE AND ACETAMINOPHEN 1 TABLET: 5; 325 TABLET ORAL at 16:49

## 2019-07-17 ASSESSMENT — PAIN DESCRIPTION - FREQUENCY
FREQUENCY: CONTINUOUS
FREQUENCY: CONTINUOUS

## 2019-07-17 ASSESSMENT — PAIN DESCRIPTION - LOCATION
LOCATION: BUTTOCKS
LOCATION: BUTTOCKS

## 2019-07-17 ASSESSMENT — PAIN SCALES - GENERAL
PAINLEVEL_OUTOF10: 4
PAINLEVEL_OUTOF10: 0
PAINLEVEL_OUTOF10: 8
PAINLEVEL_OUTOF10: 0

## 2019-07-17 ASSESSMENT — PAIN DESCRIPTION - DESCRIPTORS
DESCRIPTORS: ACHING;DISCOMFORT;NAGGING
DESCRIPTORS: ACHING;DISCOMFORT;DULL

## 2019-07-17 ASSESSMENT — PAIN DESCRIPTION - PAIN TYPE
TYPE: ACUTE PAIN
TYPE: ACUTE PAIN

## 2019-07-17 ASSESSMENT — PAIN DESCRIPTION - ONSET
ONSET: ON-GOING
ONSET: ON-GOING

## 2019-07-17 ASSESSMENT — PAIN - FUNCTIONAL ASSESSMENT
PAIN_FUNCTIONAL_ASSESSMENT: ACTIVITIES ARE NOT PREVENTED
PAIN_FUNCTIONAL_ASSESSMENT: ACTIVITIES ARE NOT PREVENTED

## 2019-07-17 ASSESSMENT — PAIN DESCRIPTION - ORIENTATION
ORIENTATION: RIGHT;LOWER
ORIENTATION: RIGHT;LOWER

## 2019-07-17 ASSESSMENT — PAIN DESCRIPTION - PROGRESSION
CLINICAL_PROGRESSION: GRADUALLY WORSENING
CLINICAL_PROGRESSION: NOT CHANGED

## 2019-07-17 NOTE — PROGRESS NOTES
provided Independent   Functional Mobility Min A   (with handheld assistance) within patient's bathroom and room. Unsteadiness demonstrated; patient used objects nearby to stabilize her balance. CGA- Min A. Assistance required to maneuver AD intermittently and safety cues required.  Mod I / Independent - with use of device, as needed/appropriate   Balance Sitting: Good  (at EOB)  Standing: Fair-  (without device)  Sitting: Sup  Standing: CGA at ww Fair+ dynamic standing balance during completion of ADLs and other functional tasks. Activity Tolerance Fair-  Fair Patient will demonstrate Good understanding and consistent implementation of energy conservation techniques and work simplification techniques into ADL/IADL routines         B UE were WFL during tasks. Comments: Upon arrival pt was supine in bed. At end of session pt was transferred to chair with alarm on, all lines and tubes intact and call light within reach. Education: Safety training during transfers and activity pacing to prevent falls during future functional tasks. · Pt has made good progress towards set goals.    · Continue with current plan of care      Out Time: 10:25    Rossy CYR/L [id number]

## 2019-07-17 NOTE — PROGRESS NOTES
Explained importance of pt having IV site changed after 4 days. Pt refused and stated \"I am leaving today. \" Will encourage IV site exchange again

## 2019-07-17 NOTE — PROGRESS NOTES
INPATIENT CARDIOLOGY FOLLOW-UP    Name: Sumeet Almeida    Age: 80 y.o. Date of Admission: 7/12/2019  3:01 PM    Date of Service: 7/17/2019    Chief Complaint: Follow-up for Follow-up for paroxysmal supraventricular tachycardia, hypertension, perirectal abscess, hypokalemia      Interim History:  No new overnight cardiac complaints and did not feel any more palpitations. No fever and still has pain around rectal area from an abscess. Currently, with no complaints of CP, SOB, dizziness, or lightheadedness. SR on telemetry.     Review of Systems:   Cardiac: As per HPI  General: No fever, chills  Pulmonary: As per HPI  HEENT: No visual disturbances, difficult swallowing  GI: No nausea, vomiting  Endocrine: No thyroid disease or DM  Musculoskeletal: POLANCO x 4, no focal motor deficits  Skin: Intact, no rashes  Neuro/Psych: No headache or seizures    Problem List:  Patient Active Problem List   Diagnosis    Gluteal abscess    Cellulitis    Supraventricular tachycardia ([address]    Essential hypertension    Hypokalemia       Allergies:  No Known Allergies    Current Medications:  Current Facility-Administered Medications   Medication Dose Route Frequency Provider Last Rate Last Dose    hydrochlorothiazide (HYDRODIURIL) tablet 25 mg  25 mg Oral Daily Steve Xie, DO   25 mg at 07/17/19 0854    metoprolol tartrate (LOPRESSOR) tablet 75 mg  75 mg Oral BID Horace Orosco MD   75 mg at 07/17/19 0854    doxycycline hyclate (VIBRAMYCIN) capsule 100 mg  100 mg Oral 2 times per day Ashley Ba MD   100 mg at 07/17/19 0854    perphenazine tablet 4 mg  4 mg Oral Daily Micky Lair, DO   4 mg at 07/17/19 0857    And    amitriptyline (ELAVIL) tablet 25 mg  25 mg Oral Daily Micky Lair, DO   25 mg at 07/17/19 0857    potassium chloride (KLOR-CON M) extended release tablet 40 mEq  40 mEq Oral Once Cassandra Disla DO        metoprolol tartrate (LOPRESSOR) tablet 50 mg  50 mg Oral Once Filomena Hatfield MD        digoxin (LANOXIN) tablet 250 mcg  250 mcg Oral Once Brittany Larios MD        perflutren lipid microspheres (DEFINITY) injection 1.65 mg  1.5 mL Intravenous ONCE PRN EMETERIO Celeste - CNS        promethazine (PHENERGAN) tablet 12.5 mg  12.5 mg Oral Q6H PRN Cassandra Disla, DO   12.5 mg at 07/13/19 1155    meclizine (ANTIVERT) tablet 12.5 mg  12.5 mg Oral TID PRN Cassandra Disla, DO   12.5 mg at 07/14/19 0804    povidone-iodine (BETADINE) 10 % external solution   Topical PRN Jes Hahn MD        hydrOXYzine (VISTARIL) capsule 25 mg  25 mg Oral BID PRN Cassandra Disla, DO   25 mg at 07/13/19 1737    HYDROcodone-acetaminophen (NORCO) 5-325 MG per tablet 1 tablet  1 tablet Oral Q6H PRN Jes Hahn MD   1 tablet at 07/16/19 2115    sodium chloride flush 0.9 % injection 10 mL  10 mL Intravenous 2 times per day May Perry, DO   10 mL at 07/17/19 0854    sodium chloride flush 0.9 % injection 10 mL  10 mL Intravenous PRN May Perry, DO   10 mL at 07/15/19 0200    magnesium hydroxide (MILK OF MAGNESIA) 400 MG/5ML suspension 30 mL  30 mL Oral Daily PRN May Perry, DO        ondansetron [facility]) injection 4 mg  4 mg Intravenous Q6H PRN May Perry, DO   4 mg at 07/16/19 2221    enoxaparin (LOVENOX) injection 40 mg  40 mg Subcutaneous Daily May Perry, DO   40 mg at 07/17/19 0854         Physical Exam:  BP (!) 156/74   Pulse 78   Temp 98 °F (36.7 °C) (Oral)   Resp 16   Ht 5' (1.524 m)   Wt 131 lb 8 oz (59.6 kg)   SpO2 96%   BMI 25.68 kg/m²   Wt Readings from Last 3 Encounters:   07/17/19 131 lb 8 oz (59.6 kg)   07/10/19 116 lb (52.6 kg)     Appearance: Awake, alert, no acute respiratory distress  Skin: Intact, no rash  Head: Normocephalic, atraumatic  Eyes: EOMI, no conjunctival erythema  ENMT: No pharyngeal erythema, MMM, no rhinorrhea  Neck: Supple, no elevated JVP, no carotid bruits  Lungs: Clear to auscultation bilaterally.  No wheezes, rales, or

## 2019-07-23 ENCOUNTER — HOSPITAL ENCOUNTER (OUTPATIENT)
Age: 84
Discharge: HOME | End: 2019-07-25

## 2019-07-23 LAB
ALBUMIN SERPL-MCNC: 3.6 G/DL (ref 3.5–5.2)
ALP BLD-CCNC: 47 U/L (ref 35–104)
ALT SERPL-CCNC: 24 U/L (ref 0–32)
ANION GAP SERPL CALCULATED.3IONS-SCNC: 16 MMOL/L (ref 7–16)
AST SERPL-CCNC: 51 U/L (ref 0–31)
BILIRUB SERPL-MCNC: 0.4 MG/DL (ref 0–1.2)
BUN BLDV-MCNC: 17 MG/DL (ref 8–23)
CALCIUM SERPL-MCNC: 9.6 MG/DL (ref 8.6–10.2)
CHLORIDE BLD-SCNC: 98 MMOL/L (ref 98–107)
CO2: 26 MMOL/L (ref 22–29)
CREAT SERPL-MCNC: 0.7 MG/DL (ref 0.5–1)
GFR AFRICAN AMERICAN: >60
GFR NON-AFRICAN AMERICAN: >60 ML/MIN/1.73
GLUCOSE BLD-MCNC: 97 MG/DL (ref 74–99)
HCT VFR BLD CALC: 39.2 % (ref 34–48)
HEMOGLOBIN: 12.5 G/DL (ref 11.5–15.5)
MCH RBC QN AUTO: 31.2 PG (ref 26–35)
MCHC RBC AUTO-ENTMCNC: 31.9 % (ref 32–34.5)
MCV RBC AUTO: 97.8 FL (ref 80–99.9)
PDW BLD-RTO: 13.4 FL (ref 11.5–15)
PLATELET # BLD: 331 E9/L (ref 130–450)
PMV BLD AUTO: 9.4 FL (ref 7–12)
POTASSIUM SERPL-SCNC: 4.1 MMOL/L (ref 3.5–5)
RBC # BLD: 4.01 E12/L (ref 3.5–5.5)
SODIUM BLD-SCNC: 140 MMOL/L (ref 132–146)
TOTAL PROTEIN: 6.5 G/DL (ref 6.4–8.3)
WBC # BLD: 10.3 E9/L (ref 4.5–11.5)

## 2019-07-23 PROCEDURE — 36415 COLL VENOUS BLD VENIPUNCTURE: CPT

## 2019-07-23 PROCEDURE — 80053 COMPREHEN METABOLIC PANEL: CPT

## 2019-07-23 PROCEDURE — 85027 COMPLETE CBC AUTOMATED: CPT

## 2019-08-01 ENCOUNTER — HOSPITAL ENCOUNTER (OUTPATIENT)
Age: 84
Discharge: HOME | End: 2019-08-03

## 2019-08-01 PROCEDURE — 36415 COLL VENOUS BLD VENIPUNCTURE: CPT

## 2019-08-01 PROCEDURE — 86481 TB AG RESPONSE T-CELL SUSP: CPT

## 2019-08-07 LAB
COMMENT: NORMAL
REPORT: NORMAL

## 2019-08-25 NOTE — DISCHARGE SUMMARY
hyclate (VIBRAMYCIN) 100 MG capsule Take 1 capsule by mouth every 12 hours for 12 days, Disp-24 capsule, R-0Normal         CONTINUE these medications which have NOT CHANGED    Details   Perphenazine-Amitriptyline (TRIAVIL 4-25 PO) Take by mouthHistorical Med      aspirin-acetaminophen-caffeine (EXCEDRIN MIGRAINE) 250-250-65 MG per tablet Take 1 tablet by mouth every 6 hours as needed for HeadachesHistorical Med      potassium bicarbonate (K-LYTE) 25 MEQ disintegrating tablet Take 25 mEq by mouth 2 times dailyHistorical Med         STOP taking these medications       atenolol-chlorthalidone (TENORETIC 100) 100-25 MG per tablet Comments:   Reason for Stopping:         doxycycline hyclate (VIBRA-TABS) 100 MG tablet Comments:   Reason for Stopping:             Activity: activity as tolerated  Diet: cardiac diet    Follow-up with PALMIRA jacob in 2 days.     Note that over 30 minutes was spent in preparing discharge papers, discussing discharge with patient, medication review, etc.    Signed:  France Mclain  8/25/2019  6:56 AM

## 2019-09-11 ENCOUNTER — OFFICE VISIT (OUTPATIENT)
Dept: CARDIOLOGY CLINIC | Age: 84
End: 2019-09-11
Payer: MEDICARE

## 2019-09-11 VITALS
SYSTOLIC BLOOD PRESSURE: 160 MMHG | HEIGHT: 60 IN | WEIGHT: 129 LBS | HEART RATE: 66 BPM | BODY MASS INDEX: 25.32 KG/M2 | DIASTOLIC BLOOD PRESSURE: 80 MMHG

## 2019-09-11 DIAGNOSIS — I47.1 SVT (SUPRAVENTRICULAR TACHYCARDIA) (HCC): Primary | ICD-10-CM

## 2019-09-11 PROCEDURE — 93000 ELECTROCARDIOGRAM COMPLETE: CPT | Performed by: INTERNAL MEDICINE

## 2019-09-11 PROCEDURE — 99213 OFFICE O/P EST LOW 20 MIN: CPT | Performed by: CLINICAL NURSE SPECIALIST

## 2019-09-11 RX ORDER — POTASSIUM CHLORIDE 750 MG/1
TABLET, FILM COATED, EXTENDED RELEASE ORAL
Refills: 11 | COMMUNITY
Start: 2019-07-24

## 2019-09-11 RX ORDER — PERPHENAZINE 4 MG/1
TABLET, FILM COATED ORAL
Refills: 2 | COMMUNITY
Start: 2019-08-30

## 2019-09-11 RX ORDER — METOPROLOL TARTRATE 50 MG/1
TABLET, FILM COATED ORAL
Refills: 2 | COMMUNITY
Start: 2019-08-16

## 2019-09-11 RX ORDER — AMLODIPINE BESYLATE 5 MG/1
5 TABLET ORAL DAILY
Qty: 90 TABLET | Refills: 1 | Status: SHIPPED | OUTPATIENT
Start: 2019-09-11

## 2019-09-16 NOTE — PROGRESS NOTES
file     Forced sexual activity: Not on file   Other Topics Concern    Not on file   Social History Narrative    Not on file       Allergies:  No Known Allergies    Current Medications:  Current Outpatient Medications   Medication Sig Dispense Refill    perphenazine 4 MG tablet TAKE ONE TABLET BY MOUTH EVERY DAY  2    potassium chloride (KLOR-CON) 10 MEQ extended release tablet TAKE 2 TABLETS BY MOUTH TWICE DAILY  11    metoprolol tartrate (LOPRESSOR) 50 MG tablet TAKE 1 AND 1/2 TABLETS BY MOUTH TWICE A DAY  2    amLODIPine (NORVASC) 5 MG tablet Take 1 tablet by mouth daily 90 tablet 1    amitriptyline (ELAVIL) 25 MG tablet Take 1 tablet by mouth daily 30 tablet 3    hydrochlorothiazide (HYDRODIURIL) 25 MG tablet Take 1 tablet by mouth daily 30 tablet 3    aspirin-acetaminophen-caffeine (EXCEDRIN MIGRAINE) 250-250-65 MG per tablet Take 1 tablet by mouth every 6 hours as needed for Headaches       No current facility-administered medications for this visit. Physical Exam:  BP (!) 160/80   Pulse 66   Ht 5' (1.524 m)   Wt 129 lb (58.5 kg)   BMI 25.19 kg/m²   Wt Readings from Last 3 Encounters:   09/11/19 129 lb (58.5 kg)   07/17/19 131 lb 8 oz (59.6 kg)   07/10/19 116 lb (52.6 kg)     Appearance: Well developed, well nourished elderly lady who appears of stated age. No apparent acute distress. Skin: Intact, no erythema, no rash  Head: Normocephalic, atraumatic. Oral mucosa pink and moist.   Neck: Supple, no elevated JVP, no carotid bruits  Lungs: Clear to auscultation bilaterally. No wheezes, rales, or rhonchi  Cardiac: Regular rate and rhythm, no murmurs, S3 or S4, or rubs  Abdomen: Soft, non-tender, non-distended. Bowel sounds present. Extremities: No lower extremity edema, intact posterior tibial pulses bilaterally  Neurologic: Normal mood and affect. Alert and oriented to person, place, time. Speech clear and appropriate. Musculoskeletal: Moves all extremities. No gross muscle atrophy.

## 2019-12-26 ENCOUNTER — HOSPITAL ENCOUNTER (EMERGENCY)
Age: 84
Discharge: HOME | End: 2019-12-26
Attending: EMERGENCY MEDICINE
Payer: MEDICARE

## 2019-12-26 VITALS
DIASTOLIC BLOOD PRESSURE: 61 MMHG | WEIGHT: 125 LBS | HEIGHT: 58 IN | RESPIRATION RATE: 16 BRPM | SYSTOLIC BLOOD PRESSURE: 123 MMHG | HEART RATE: 72 BPM | OXYGEN SATURATION: 94 % | TEMPERATURE: 97.9 F | BODY MASS INDEX: 26.24 KG/M2

## 2019-12-26 DIAGNOSIS — L02.219 CELLULITIS AND ABSCESS OF TRUNK: Primary | ICD-10-CM

## 2019-12-26 DIAGNOSIS — L03.319 CELLULITIS AND ABSCESS OF TRUNK: Primary | ICD-10-CM

## 2019-12-26 PROCEDURE — 2580000003 HC RX 258: Performed by: STUDENT IN AN ORGANIZED HEALTH CARE EDUCATION/TRAINING PROGRAM

## 2019-12-26 PROCEDURE — 99282 EMERGENCY DEPT VISIT SF MDM: CPT

## 2019-12-26 PROCEDURE — 2500000003 HC RX 250 WO HCPCS: Performed by: STUDENT IN AN ORGANIZED HEALTH CARE EDUCATION/TRAINING PROGRAM

## 2019-12-26 PROCEDURE — 96374 THER/PROPH/DIAG INJ IV PUSH: CPT

## 2019-12-26 RX ADMIN — DOXYCYCLINE 100 MG: 100 INJECTION, POWDER, LYOPHILIZED, FOR SOLUTION INTRAVENOUS at 17:57

## 2019-12-26 ASSESSMENT — ENCOUNTER SYMPTOMS
WHEEZING: 0
CHEST TIGHTNESS: 0
COLOR CHANGE: 1
ABDOMINAL PAIN: 0
COUGH: 0
PHOTOPHOBIA: 0
VOMITING: 0
NAUSEA: 0
SHORTNESS OF BREATH: 0

## 2020-01-01 ENCOUNTER — APPOINTMENT (OUTPATIENT)
Dept: GENERAL RADIOLOGY | Age: 85
DRG: 177 | End: 2020-01-01
Payer: MEDICARE

## 2020-01-01 ENCOUNTER — HOSPITAL ENCOUNTER (INPATIENT)
Age: 85
LOS: 16 days | DRG: 177 | End: 2021-01-04
Attending: EMERGENCY MEDICINE | Admitting: INTERNAL MEDICINE
Payer: MEDICARE

## 2020-01-01 ENCOUNTER — APPOINTMENT (OUTPATIENT)
Dept: CT IMAGING | Age: 85
DRG: 177 | End: 2020-01-01
Payer: MEDICARE

## 2020-01-01 DIAGNOSIS — U07.1 PNEUMONIA DUE TO COVID-19 VIRUS: ICD-10-CM

## 2020-01-01 DIAGNOSIS — R06.03 ACUTE RESPIRATORY DISTRESS: Primary | ICD-10-CM

## 2020-01-01 DIAGNOSIS — J12.82 PNEUMONIA DUE TO COVID-19 VIRUS: ICD-10-CM

## 2020-01-01 LAB
ALBUMIN SERPL-MCNC: 2.8 G/DL (ref 3.5–5.2)
ALBUMIN SERPL-MCNC: 2.9 G/DL (ref 3.5–5.2)
ALBUMIN SERPL-MCNC: 2.9 G/DL (ref 3.5–5.2)
ALBUMIN SERPL-MCNC: 3 G/DL (ref 3.5–5.2)
ALBUMIN SERPL-MCNC: 3.1 G/DL (ref 3.5–5.2)
ALBUMIN SERPL-MCNC: 3.1 G/DL (ref 3.5–5.2)
ALBUMIN SERPL-MCNC: 3.2 G/DL (ref 3.5–5.2)
ALBUMIN SERPL-MCNC: 3.2 G/DL (ref 3.5–5.2)
ALBUMIN SERPL-MCNC: 3.3 G/DL (ref 3.5–5.2)
ALP BLD-CCNC: 40 U/L (ref 35–104)
ALP BLD-CCNC: 45 U/L (ref 35–104)
ALP BLD-CCNC: 50 U/L (ref 35–104)
ALP BLD-CCNC: 52 U/L (ref 35–104)
ALP BLD-CCNC: 52 U/L (ref 35–104)
ALP BLD-CCNC: 53 U/L (ref 35–104)
ALP BLD-CCNC: 60 U/L (ref 35–104)
ALP BLD-CCNC: 62 U/L (ref 35–104)
ALP BLD-CCNC: 71 U/L (ref 35–104)
ALP BLD-CCNC: 83 U/L (ref 35–104)
ALP BLD-CCNC: 84 U/L (ref 35–104)
ALT SERPL-CCNC: 10 U/L (ref 0–32)
ALT SERPL-CCNC: 11 U/L (ref 0–32)
ALT SERPL-CCNC: 14 U/L (ref 0–32)
ALT SERPL-CCNC: 14 U/L (ref 0–32)
ALT SERPL-CCNC: 16 U/L (ref 0–32)
ALT SERPL-CCNC: 20 U/L (ref 0–32)
ALT SERPL-CCNC: 26 U/L (ref 0–32)
ALT SERPL-CCNC: 26 U/L (ref 0–32)
ALT SERPL-CCNC: 31 U/L (ref 0–32)
ALT SERPL-CCNC: 37 U/L (ref 0–32)
ALT SERPL-CCNC: 40 U/L (ref 0–32)
ANION GAP SERPL CALCULATED.3IONS-SCNC: 10 MMOL/L (ref 7–16)
ANION GAP SERPL CALCULATED.3IONS-SCNC: 11 MMOL/L (ref 7–16)
ANION GAP SERPL CALCULATED.3IONS-SCNC: 12 MMOL/L (ref 7–16)
ANION GAP SERPL CALCULATED.3IONS-SCNC: 13 MMOL/L (ref 7–16)
ANION GAP SERPL CALCULATED.3IONS-SCNC: 7 MMOL/L (ref 7–16)
ANION GAP SERPL CALCULATED.3IONS-SCNC: 8 MMOL/L (ref 7–16)
ANION GAP SERPL CALCULATED.3IONS-SCNC: 8 MMOL/L (ref 7–16)
ANION GAP SERPL CALCULATED.3IONS-SCNC: 9 MMOL/L (ref 7–16)
ANION GAP SERPL CALCULATED.3IONS-SCNC: 9 MMOL/L (ref 7–16)
APTT: 26.5 SEC (ref 24.5–35.1)
AST SERPL-CCNC: 27 U/L (ref 0–31)
AST SERPL-CCNC: 29 U/L (ref 0–31)
AST SERPL-CCNC: 31 U/L (ref 0–31)
AST SERPL-CCNC: 32 U/L (ref 0–31)
AST SERPL-CCNC: 36 U/L (ref 0–31)
AST SERPL-CCNC: 41 U/L (ref 0–31)
AST SERPL-CCNC: 42 U/L (ref 0–31)
AST SERPL-CCNC: 46 U/L (ref 0–31)
AST SERPL-CCNC: 53 U/L (ref 0–31)
AST SERPL-CCNC: 66 U/L (ref 0–31)
AST SERPL-CCNC: 66 U/L (ref 0–31)
BASOPHILS ABSOLUTE: 0 E9/L (ref 0–0.2)
BASOPHILS ABSOLUTE: 0.01 E9/L (ref 0–0.2)
BASOPHILS ABSOLUTE: 0.02 E9/L (ref 0–0.2)
BASOPHILS ABSOLUTE: 0.03 E9/L (ref 0–0.2)
BASOPHILS ABSOLUTE: 0.04 E9/L (ref 0–0.2)
BASOPHILS ABSOLUTE: 0.04 E9/L (ref 0–0.2)
BASOPHILS ABSOLUTE: 0.05 E9/L (ref 0–0.2)
BASOPHILS RELATIVE PERCENT: 0 % (ref 0–2)
BASOPHILS RELATIVE PERCENT: 0.1 % (ref 0–2)
BASOPHILS RELATIVE PERCENT: 0.2 % (ref 0–2)
BASOPHILS RELATIVE PERCENT: 0.3 % (ref 0–2)
BILIRUB SERPL-MCNC: 0.3 MG/DL (ref 0–1.2)
BILIRUB SERPL-MCNC: 0.3 MG/DL (ref 0–1.2)
BILIRUB SERPL-MCNC: 0.4 MG/DL (ref 0–1.2)
BILIRUB SERPL-MCNC: 0.5 MG/DL (ref 0–1.2)
BILIRUB SERPL-MCNC: 0.6 MG/DL (ref 0–1.2)
BILIRUB SERPL-MCNC: 0.6 MG/DL (ref 0–1.2)
BILIRUB SERPL-MCNC: 0.8 MG/DL (ref 0–1.2)
BILIRUB SERPL-MCNC: 0.9 MG/DL (ref 0–1.2)
BILIRUB SERPL-MCNC: 0.9 MG/DL (ref 0–1.2)
BILIRUBIN DIRECT: <0.2 MG/DL (ref 0–0.3)
BILIRUBIN DIRECT: <0.2 MG/DL (ref 0–0.3)
BILIRUBIN, INDIRECT: ABNORMAL MG/DL (ref 0–1)
BILIRUBIN, INDIRECT: ABNORMAL MG/DL (ref 0–1)
BUN BLDV-MCNC: 11 MG/DL (ref 8–23)
BUN BLDV-MCNC: 12 MG/DL (ref 8–23)
BUN BLDV-MCNC: 19 MG/DL (ref 8–23)
BUN BLDV-MCNC: 19 MG/DL (ref 8–23)
BUN BLDV-MCNC: 24 MG/DL (ref 8–23)
BUN BLDV-MCNC: 24 MG/DL (ref 8–23)
BUN BLDV-MCNC: 30 MG/DL (ref 8–23)
BUN BLDV-MCNC: 34 MG/DL (ref 8–23)
BUN BLDV-MCNC: 35 MG/DL (ref 8–23)
BUN BLDV-MCNC: 35 MG/DL (ref 8–23)
BUN BLDV-MCNC: 36 MG/DL (ref 8–23)
BUN BLDV-MCNC: 37 MG/DL (ref 8–23)
BUN BLDV-MCNC: 37 MG/DL (ref 8–23)
BURR CELLS: ABNORMAL
C-REACTIVE PROTEIN: 10.8 MG/DL (ref 0–0.4)
C-REACTIVE PROTEIN: 14.2 MG/DL (ref 0–0.4)
CALCIUM SERPL-MCNC: 8.1 MG/DL (ref 8.6–10.2)
CALCIUM SERPL-MCNC: 8.7 MG/DL (ref 8.6–10.2)
CALCIUM SERPL-MCNC: 8.8 MG/DL (ref 8.6–10.2)
CALCIUM SERPL-MCNC: 8.8 MG/DL (ref 8.6–10.2)
CALCIUM SERPL-MCNC: 9 MG/DL (ref 8.6–10.2)
CALCIUM SERPL-MCNC: 9.1 MG/DL (ref 8.6–10.2)
CALCIUM SERPL-MCNC: 9.1 MG/DL (ref 8.6–10.2)
CALCIUM SERPL-MCNC: 9.2 MG/DL (ref 8.6–10.2)
CALCIUM SERPL-MCNC: 9.3 MG/DL (ref 8.6–10.2)
CALCIUM SERPL-MCNC: 9.3 MG/DL (ref 8.6–10.2)
CHLORIDE BLD-SCNC: 100 MMOL/L (ref 98–107)
CHLORIDE BLD-SCNC: 101 MMOL/L (ref 98–107)
CHLORIDE BLD-SCNC: 101 MMOL/L (ref 98–107)
CHLORIDE BLD-SCNC: 102 MMOL/L (ref 98–107)
CHLORIDE BLD-SCNC: 103 MMOL/L (ref 98–107)
CHLORIDE BLD-SCNC: 99 MMOL/L (ref 98–107)
CO2: 21 MMOL/L (ref 22–29)
CO2: 21 MMOL/L (ref 22–29)
CO2: 22 MMOL/L (ref 22–29)
CO2: 23 MMOL/L (ref 22–29)
CO2: 24 MMOL/L (ref 22–29)
CO2: 25 MMOL/L (ref 22–29)
CO2: 25 MMOL/L (ref 22–29)
CO2: 27 MMOL/L (ref 22–29)
CREAT SERPL-MCNC: 0.5 MG/DL (ref 0.5–1)
CREAT SERPL-MCNC: 0.6 MG/DL (ref 0.5–1)
CREAT SERPL-MCNC: 0.7 MG/DL (ref 0.5–1)
CREAT SERPL-MCNC: 0.8 MG/DL (ref 0.5–1)
D DIMER: 1239 NG/ML DDU
D DIMER: 2156 NG/ML DDU
D DIMER: 2935 NG/ML DDU
D DIMER: 3675 NG/ML DDU
D DIMER: 3826 NG/ML DDU
D DIMER: 416 NG/ML DDU
D DIMER: 515 NG/ML DDU
D DIMER: 700 NG/ML DDU
D DIMER: 745 NG/ML DDU
D DIMER: 806 NG/ML DDU
EKG ATRIAL RATE: 89 BPM
EKG ATRIAL RATE: 96 BPM
EKG P AXIS: 76 DEGREES
EKG P-R INTERVAL: 178 MS
EKG Q-T INTERVAL: 332 MS
EKG Q-T INTERVAL: 356 MS
EKG QRS DURATION: 70 MS
EKG QRS DURATION: 74 MS
EKG QTC CALCULATION (BAZETT): 433 MS
EKG QTC CALCULATION (BAZETT): 465 MS
EKG R AXIS: -15 DEGREES
EKG R AXIS: -33 DEGREES
EKG T AXIS: 42 DEGREES
EKG T AXIS: 46 DEGREES
EKG VENTRICULAR RATE: 118 BPM
EKG VENTRICULAR RATE: 89 BPM
EOSINOPHILS ABSOLUTE: 0 E9/L (ref 0.05–0.5)
EOSINOPHILS ABSOLUTE: 0.08 E9/L (ref 0.05–0.5)
EOSINOPHILS RELATIVE PERCENT: 0 % (ref 0–6)
EOSINOPHILS RELATIVE PERCENT: 0.4 % (ref 0–6)
FERRITIN: 837 NG/ML
FIBRINOGEN: 196 MG/DL (ref 225–540)
GFR AFRICAN AMERICAN: >60
GFR NON-AFRICAN AMERICAN: >60 ML/MIN/1.73
GLUCOSE BLD-MCNC: 109 MG/DL (ref 74–99)
GLUCOSE BLD-MCNC: 111 MG/DL (ref 74–99)
GLUCOSE BLD-MCNC: 120 MG/DL (ref 74–99)
GLUCOSE BLD-MCNC: 126 MG/DL (ref 74–99)
GLUCOSE BLD-MCNC: 133 MG/DL (ref 74–99)
GLUCOSE BLD-MCNC: 137 MG/DL (ref 74–99)
GLUCOSE BLD-MCNC: 147 MG/DL (ref 74–99)
GLUCOSE BLD-MCNC: 148 MG/DL (ref 74–99)
GLUCOSE BLD-MCNC: 152 MG/DL (ref 74–99)
GLUCOSE BLD-MCNC: 154 MG/DL (ref 74–99)
GLUCOSE BLD-MCNC: 155 MG/DL (ref 74–99)
GLUCOSE BLD-MCNC: 161 MG/DL (ref 74–99)
GLUCOSE BLD-MCNC: 94 MG/DL (ref 74–99)
HCT VFR BLD CALC: 34.6 % (ref 34–48)
HCT VFR BLD CALC: 37.9 % (ref 34–48)
HCT VFR BLD CALC: 38.1 % (ref 34–48)
HCT VFR BLD CALC: 39.8 % (ref 34–48)
HCT VFR BLD CALC: 40.7 % (ref 34–48)
HCT VFR BLD CALC: 40.8 % (ref 34–48)
HCT VFR BLD CALC: 40.9 % (ref 34–48)
HCT VFR BLD CALC: 41.3 % (ref 34–48)
HCT VFR BLD CALC: 42.1 % (ref 34–48)
HCT VFR BLD CALC: 43 % (ref 34–48)
HCT VFR BLD CALC: 43 % (ref 34–48)
HEMOGLOBIN: 11.4 G/DL (ref 11.5–15.5)
HEMOGLOBIN: 12.1 G/DL (ref 11.5–15.5)
HEMOGLOBIN: 12.6 G/DL (ref 11.5–15.5)
HEMOGLOBIN: 13.1 G/DL (ref 11.5–15.5)
HEMOGLOBIN: 13.2 G/DL (ref 11.5–15.5)
HEMOGLOBIN: 13.3 G/DL (ref 11.5–15.5)
HEMOGLOBIN: 13.4 G/DL (ref 11.5–15.5)
HEMOGLOBIN: 13.8 G/DL (ref 11.5–15.5)
HEMOGLOBIN: 14.2 G/DL (ref 11.5–15.5)
IMMATURE GRANULOCYTES #: 0.03 E9/L
IMMATURE GRANULOCYTES #: 0.15 E9/L
IMMATURE GRANULOCYTES #: 0.21 E9/L
IMMATURE GRANULOCYTES #: 0.21 E9/L
IMMATURE GRANULOCYTES #: 0.29 E9/L
IMMATURE GRANULOCYTES #: 0.33 E9/L
IMMATURE GRANULOCYTES #: 0.35 E9/L
IMMATURE GRANULOCYTES #: 0.36 E9/L
IMMATURE GRANULOCYTES #: 0.49 E9/L
IMMATURE GRANULOCYTES #: 0.62 E9/L
IMMATURE GRANULOCYTES %: 0.5 % (ref 0–5)
IMMATURE GRANULOCYTES %: 0.9 % (ref 0–5)
IMMATURE GRANULOCYTES %: 1.4 % (ref 0–5)
IMMATURE GRANULOCYTES %: 1.5 % (ref 0–5)
IMMATURE GRANULOCYTES %: 1.9 % (ref 0–5)
IMMATURE GRANULOCYTES %: 2.1 % (ref 0–5)
IMMATURE GRANULOCYTES %: 2.4 % (ref 0–5)
IMMATURE GRANULOCYTES %: 2.9 % (ref 0–5)
IMMATURE GRANULOCYTES %: 3.7 % (ref 0–5)
IMMATURE GRANULOCYTES %: 4.9 % (ref 0–5)
INR BLD: 1.2
L. PNEUMOPHILA SEROGP 1 UR AG: NORMAL
LACTATE DEHYDROGENASE: 377 U/L (ref 135–214)
LYMPHOCYTES ABSOLUTE: 0.65 E9/L (ref 1.5–4)
LYMPHOCYTES ABSOLUTE: 0.71 E9/L (ref 1.5–4)
LYMPHOCYTES ABSOLUTE: 0.87 E9/L (ref 1.5–4)
LYMPHOCYTES ABSOLUTE: 0.91 E9/L (ref 1.5–4)
LYMPHOCYTES ABSOLUTE: 0.96 E9/L (ref 1.5–4)
LYMPHOCYTES ABSOLUTE: 0.98 E9/L (ref 1.5–4)
LYMPHOCYTES ABSOLUTE: 1.1 E9/L (ref 1.5–4)
LYMPHOCYTES ABSOLUTE: 1.14 E9/L (ref 1.5–4)
LYMPHOCYTES ABSOLUTE: 1.42 E9/L (ref 1.5–4)
LYMPHOCYTES ABSOLUTE: 1.59 E9/L (ref 1.5–4)
LYMPHOCYTES ABSOLUTE: 1.6 E9/L (ref 1.5–4)
LYMPHOCYTES RELATIVE PERCENT: 11.2 % (ref 20–42)
LYMPHOCYTES RELATIVE PERCENT: 14.7 % (ref 20–42)
LYMPHOCYTES RELATIVE PERCENT: 14.8 % (ref 20–42)
LYMPHOCYTES RELATIVE PERCENT: 14.9 % (ref 20–42)
LYMPHOCYTES RELATIVE PERCENT: 17 % (ref 20–42)
LYMPHOCYTES RELATIVE PERCENT: 3.7 % (ref 20–42)
LYMPHOCYTES RELATIVE PERCENT: 3.8 % (ref 20–42)
LYMPHOCYTES RELATIVE PERCENT: 3.9 % (ref 20–42)
LYMPHOCYTES RELATIVE PERCENT: 6.2 % (ref 20–42)
LYMPHOCYTES RELATIVE PERCENT: 8.3 % (ref 20–42)
LYMPHOCYTES RELATIVE PERCENT: 8.4 % (ref 20–42)
MAGNESIUM: 1.2 MG/DL (ref 1.6–2.6)
MAGNESIUM: 2 MG/DL (ref 1.6–2.6)
MAGNESIUM: 2.2 MG/DL (ref 1.6–2.6)
MCH RBC QN AUTO: 29 PG (ref 26–35)
MCH RBC QN AUTO: 29.4 PG (ref 26–35)
MCH RBC QN AUTO: 29.6 PG (ref 26–35)
MCH RBC QN AUTO: 29.7 PG (ref 26–35)
MCH RBC QN AUTO: 29.8 PG (ref 26–35)
MCH RBC QN AUTO: 30 PG (ref 26–35)
MCH RBC QN AUTO: 30.1 PG (ref 26–35)
MCH RBC QN AUTO: 30.2 PG (ref 26–35)
MCHC RBC AUTO-ENTMCNC: 31.6 % (ref 32–34.5)
MCHC RBC AUTO-ENTMCNC: 31.9 % (ref 32–34.5)
MCHC RBC AUTO-ENTMCNC: 32.1 % (ref 32–34.5)
MCHC RBC AUTO-ENTMCNC: 32.2 % (ref 32–34.5)
MCHC RBC AUTO-ENTMCNC: 32.4 % (ref 32–34.5)
MCHC RBC AUTO-ENTMCNC: 32.6 % (ref 32–34.5)
MCHC RBC AUTO-ENTMCNC: 32.8 % (ref 32–34.5)
MCHC RBC AUTO-ENTMCNC: 32.9 % (ref 32–34.5)
MCHC RBC AUTO-ENTMCNC: 32.9 % (ref 32–34.5)
MCHC RBC AUTO-ENTMCNC: 33 % (ref 32–34.5)
MCHC RBC AUTO-ENTMCNC: 33.1 % (ref 32–34.5)
MCV RBC AUTO: 89.8 FL (ref 80–99.9)
MCV RBC AUTO: 90.3 FL (ref 80–99.9)
MCV RBC AUTO: 90.7 FL (ref 80–99.9)
MCV RBC AUTO: 91.1 FL (ref 80–99.9)
MCV RBC AUTO: 91.1 FL (ref 80–99.9)
MCV RBC AUTO: 91.3 FL (ref 80–99.9)
MCV RBC AUTO: 91.7 FL (ref 80–99.9)
MCV RBC AUTO: 91.8 FL (ref 80–99.9)
MCV RBC AUTO: 91.9 FL (ref 80–99.9)
MCV RBC AUTO: 92 FL (ref 80–99.9)
MCV RBC AUTO: 93.3 FL (ref 80–99.9)
MONOCYTES ABSOLUTE: 0 E9/L (ref 0.1–0.95)
MONOCYTES ABSOLUTE: 0.32 E9/L (ref 0.1–0.95)
MONOCYTES ABSOLUTE: 0.42 E9/L (ref 0.1–0.95)
MONOCYTES ABSOLUTE: 0.5 E9/L (ref 0.1–0.95)
MONOCYTES ABSOLUTE: 0.52 E9/L (ref 0.1–0.95)
MONOCYTES ABSOLUTE: 0.54 E9/L (ref 0.1–0.95)
MONOCYTES ABSOLUTE: 0.56 E9/L (ref 0.1–0.95)
MONOCYTES ABSOLUTE: 0.57 E9/L (ref 0.1–0.95)
MONOCYTES ABSOLUTE: 0.6 E9/L (ref 0.1–0.95)
MONOCYTES ABSOLUTE: 0.82 E9/L (ref 0.1–0.95)
MONOCYTES ABSOLUTE: 0.91 E9/L (ref 0.1–0.95)
MONOCYTES RELATIVE PERCENT: 0 % (ref 2–12)
MONOCYTES RELATIVE PERCENT: 2.6 % (ref 2–12)
MONOCYTES RELATIVE PERCENT: 2.9 % (ref 2–12)
MONOCYTES RELATIVE PERCENT: 3.9 % (ref 2–12)
MONOCYTES RELATIVE PERCENT: 4.1 % (ref 2–12)
MONOCYTES RELATIVE PERCENT: 4.1 % (ref 2–12)
MONOCYTES RELATIVE PERCENT: 4.3 % (ref 2–12)
MONOCYTES RELATIVE PERCENT: 4.7 % (ref 2–12)
MONOCYTES RELATIVE PERCENT: 5.2 % (ref 2–12)
MONOCYTES RELATIVE PERCENT: 5.5 % (ref 2–12)
MONOCYTES RELATIVE PERCENT: 6.9 % (ref 2–12)
MYELOCYTE PERCENT: 0.9 % (ref 0–0)
NEUTROPHILS ABSOLUTE: 10.08 E9/L (ref 1.8–7.3)
NEUTROPHILS ABSOLUTE: 10.7 E9/L (ref 1.8–7.3)
NEUTROPHILS ABSOLUTE: 12.73 E9/L (ref 1.8–7.3)
NEUTROPHILS ABSOLUTE: 15.54 E9/L (ref 1.8–7.3)
NEUTROPHILS ABSOLUTE: 17.18 E9/L (ref 1.8–7.3)
NEUTROPHILS ABSOLUTE: 20.38 E9/L (ref 1.8–7.3)
NEUTROPHILS ABSOLUTE: 4.44 E9/L (ref 1.8–7.3)
NEUTROPHILS ABSOLUTE: 6.46 E9/L (ref 1.8–7.3)
NEUTROPHILS ABSOLUTE: 8.36 E9/L (ref 1.8–7.3)
NEUTROPHILS ABSOLUTE: 8.63 E9/L (ref 1.8–7.3)
NEUTROPHILS ABSOLUTE: 9.62 E9/L (ref 1.8–7.3)
NEUTROPHILS RELATIVE PERCENT: 76.8 % (ref 43–80)
NEUTROPHILS RELATIVE PERCENT: 78.4 % (ref 43–80)
NEUTROPHILS RELATIVE PERCENT: 79.4 % (ref 43–80)
NEUTROPHILS RELATIVE PERCENT: 79.5 % (ref 43–80)
NEUTROPHILS RELATIVE PERCENT: 80.9 % (ref 43–80)
NEUTROPHILS RELATIVE PERCENT: 83.8 % (ref 43–80)
NEUTROPHILS RELATIVE PERCENT: 84.3 % (ref 43–80)
NEUTROPHILS RELATIVE PERCENT: 87.2 % (ref 43–80)
NEUTROPHILS RELATIVE PERCENT: 90.3 % (ref 43–80)
NEUTROPHILS RELATIVE PERCENT: 91.1 % (ref 43–80)
NEUTROPHILS RELATIVE PERCENT: 92 % (ref 43–80)
NUCLEATED RED BLOOD CELLS: 0 /100 WBC
OVALOCYTES: ABNORMAL
PDW BLD-RTO: 13.1 FL (ref 11.5–15)
PDW BLD-RTO: 13.1 FL (ref 11.5–15)
PDW BLD-RTO: 13.2 FL (ref 11.5–15)
PDW BLD-RTO: 13.3 FL (ref 11.5–15)
PDW BLD-RTO: 13.3 FL (ref 11.5–15)
PDW BLD-RTO: 13.4 FL (ref 11.5–15)
PDW BLD-RTO: 13.5 FL (ref 11.5–15)
PHOSPHORUS: 2.6 MG/DL (ref 2.5–4.5)
PLATELET # BLD: 115 E9/L (ref 130–450)
PLATELET # BLD: 180 E9/L (ref 130–450)
PLATELET # BLD: 183 E9/L (ref 130–450)
PLATELET # BLD: 210 E9/L (ref 130–450)
PLATELET # BLD: 236 E9/L (ref 130–450)
PLATELET # BLD: 283 E9/L (ref 130–450)
PLATELET # BLD: 284 E9/L (ref 130–450)
PLATELET # BLD: 326 E9/L (ref 130–450)
PLATELET # BLD: 333 E9/L (ref 130–450)
PLATELET # BLD: 351 E9/L (ref 130–450)
PLATELET # BLD: 367 E9/L (ref 130–450)
PMV BLD AUTO: 8.8 FL (ref 7–12)
PMV BLD AUTO: 8.8 FL (ref 7–12)
PMV BLD AUTO: 9 FL (ref 7–12)
PMV BLD AUTO: 9 FL (ref 7–12)
PMV BLD AUTO: 9.2 FL (ref 7–12)
PMV BLD AUTO: 9.2 FL (ref 7–12)
PMV BLD AUTO: 9.3 FL (ref 7–12)
PMV BLD AUTO: 9.4 FL (ref 7–12)
PMV BLD AUTO: 9.7 FL (ref 7–12)
POIKILOCYTES: ABNORMAL
POIKILOCYTES: ABNORMAL
POLYCHROMASIA: ABNORMAL
POLYCHROMASIA: ABNORMAL
POTASSIUM REFLEX MAGNESIUM: 3.6 MMOL/L (ref 3.5–5)
POTASSIUM REFLEX MAGNESIUM: 3.6 MMOL/L (ref 3.5–5)
POTASSIUM REFLEX MAGNESIUM: 3.8 MMOL/L (ref 3.5–5)
POTASSIUM REFLEX MAGNESIUM: 3.9 MMOL/L (ref 3.5–5)
POTASSIUM REFLEX MAGNESIUM: 4 MMOL/L (ref 3.5–5)
POTASSIUM REFLEX MAGNESIUM: 4.2 MMOL/L (ref 3.5–5)
POTASSIUM REFLEX MAGNESIUM: 4.4 MMOL/L (ref 3.5–5)
POTASSIUM REFLEX MAGNESIUM: 4.4 MMOL/L (ref 3.5–5)
POTASSIUM REFLEX MAGNESIUM: 4.5 MMOL/L (ref 3.5–5)
POTASSIUM REFLEX MAGNESIUM: 4.6 MMOL/L (ref 3.5–5)
POTASSIUM SERPL-SCNC: 2.5 MMOL/L (ref 3.5–5)
POTASSIUM SERPL-SCNC: 3.6 MMOL/L (ref 3.5–5)
POTASSIUM SERPL-SCNC: 3.7 MMOL/L (ref 3.5–5)
POTASSIUM SERPL-SCNC: 3.7 MMOL/L (ref 3.5–5)
PRO-BNP: 334 PG/ML (ref 0–450)
PROCALCITONIN: 0.11 NG/ML (ref 0–0.08)
PROTHROMBIN TIME: 13.7 SEC (ref 9.3–12.4)
RBC # BLD: 3.77 E12/L (ref 3.5–5.5)
RBC # BLD: 4.06 E12/L (ref 3.5–5.5)
RBC # BLD: 4.18 E12/L (ref 3.5–5.5)
RBC # BLD: 4.43 E12/L (ref 3.5–5.5)
RBC # BLD: 4.46 E12/L (ref 3.5–5.5)
RBC # BLD: 4.48 E12/L (ref 3.5–5.5)
RBC # BLD: 4.49 E12/L (ref 3.5–5.5)
RBC # BLD: 4.53 E12/L (ref 3.5–5.5)
RBC # BLD: 4.58 E12/L (ref 3.5–5.5)
RBC # BLD: 4.69 E12/L (ref 3.5–5.5)
RBC # BLD: 4.74 E12/L (ref 3.5–5.5)
SARS-COV-2, NAAT: DETECTED
SEDIMENTATION RATE, ERYTHROCYTE: 50 MM/HR (ref 0–20)
SODIUM BLD-SCNC: 132 MMOL/L (ref 132–146)
SODIUM BLD-SCNC: 133 MMOL/L (ref 132–146)
SODIUM BLD-SCNC: 133 MMOL/L (ref 132–146)
SODIUM BLD-SCNC: 134 MMOL/L (ref 132–146)
SODIUM BLD-SCNC: 135 MMOL/L (ref 132–146)
SODIUM BLD-SCNC: 136 MMOL/L (ref 132–146)
SODIUM BLD-SCNC: 137 MMOL/L (ref 132–146)
SODIUM BLD-SCNC: 137 MMOL/L (ref 132–146)
STREP PNEUMONIAE ANTIGEN, URINE: NORMAL
TOTAL PROTEIN: 5.6 G/DL (ref 6.4–8.3)
TOTAL PROTEIN: 5.8 G/DL (ref 6.4–8.3)
TOTAL PROTEIN: 6 G/DL (ref 6.4–8.3)
TOTAL PROTEIN: 6 G/DL (ref 6.4–8.3)
TOTAL PROTEIN: 6.2 G/DL (ref 6.4–8.3)
TOTAL PROTEIN: 6.2 G/DL (ref 6.4–8.3)
TOTAL PROTEIN: 6.3 G/DL (ref 6.4–8.3)
TOTAL PROTEIN: 6.3 G/DL (ref 6.4–8.3)
TOTAL PROTEIN: 6.5 G/DL (ref 6.4–8.3)
TOTAL PROTEIN: 6.7 G/DL (ref 6.4–8.3)
TOTAL PROTEIN: 6.8 G/DL (ref 6.4–8.3)
TROPONIN: <0.01 NG/ML (ref 0–0.03)
WBC # BLD: 10.7 E9/L (ref 4.5–11.5)
WBC # BLD: 10.9 E9/L (ref 4.5–11.5)
WBC # BLD: 11.5 E9/L (ref 4.5–11.5)
WBC # BLD: 12.7 E9/L (ref 4.5–11.5)
WBC # BLD: 13.2 E9/L (ref 4.5–11.5)
WBC # BLD: 14.6 E9/L (ref 4.5–11.5)
WBC # BLD: 17.1 E9/L (ref 4.5–11.5)
WBC # BLD: 19 E9/L (ref 4.5–11.5)
WBC # BLD: 22.2 E9/L (ref 4.5–11.5)
WBC # BLD: 5.8 E9/L (ref 4.5–11.5)
WBC # BLD: 7.6 E9/L (ref 4.5–11.5)

## 2020-01-01 PROCEDURE — 80076 HEPATIC FUNCTION PANEL: CPT

## 2020-01-01 PROCEDURE — 85378 FIBRIN DEGRADE SEMIQUANT: CPT

## 2020-01-01 PROCEDURE — 2580000003 HC RX 258: Performed by: INTERNAL MEDICINE

## 2020-01-01 PROCEDURE — 80048 BASIC METABOLIC PNL TOTAL CA: CPT

## 2020-01-01 PROCEDURE — 99233 SBSQ HOSP IP/OBS HIGH 50: CPT | Performed by: FAMILY MEDICINE

## 2020-01-01 PROCEDURE — 6370000000 HC RX 637 (ALT 250 FOR IP): Performed by: INTERNAL MEDICINE

## 2020-01-01 PROCEDURE — 6360000002 HC RX W HCPCS: Performed by: NURSE PRACTITIONER

## 2020-01-01 PROCEDURE — 36415 COLL VENOUS BLD VENIPUNCTURE: CPT

## 2020-01-01 PROCEDURE — XW033E5 INTRODUCTION OF REMDESIVIR ANTI-INFECTIVE INTO PERIPHERAL VEIN, PERCUTANEOUS APPROACH, NEW TECHNOLOGY GROUP 5: ICD-10-PCS | Performed by: INTERNAL MEDICINE

## 2020-01-01 PROCEDURE — 85610 PROTHROMBIN TIME: CPT

## 2020-01-01 PROCEDURE — 85025 COMPLETE CBC W/AUTO DIFF WBC: CPT

## 2020-01-01 PROCEDURE — 87449 NOS EACH ORGANISM AG IA: CPT

## 2020-01-01 PROCEDURE — 6360000002 HC RX W HCPCS: Performed by: INTERNAL MEDICINE

## 2020-01-01 PROCEDURE — 2500000003 HC RX 250 WO HCPCS: Performed by: INTERNAL MEDICINE

## 2020-01-01 PROCEDURE — 96375 TX/PRO/DX INJ NEW DRUG ADDON: CPT

## 2020-01-01 PROCEDURE — 94660 CPAP INITIATION&MGMT: CPT

## 2020-01-01 PROCEDURE — 2700000000 HC OXYGEN THERAPY PER DAY

## 2020-01-01 PROCEDURE — 80053 COMPREHEN METABOLIC PANEL: CPT

## 2020-01-01 PROCEDURE — 2060000000 HC ICU INTERMEDIATE R&B

## 2020-01-01 PROCEDURE — 99223 1ST HOSP IP/OBS HIGH 75: CPT | Performed by: INTERNAL MEDICINE

## 2020-01-01 PROCEDURE — 6370000000 HC RX 637 (ALT 250 FOR IP): Performed by: FAMILY MEDICINE

## 2020-01-01 PROCEDURE — 99285 EMERGENCY DEPT VISIT HI MDM: CPT

## 2020-01-01 PROCEDURE — 84484 ASSAY OF TROPONIN QUANT: CPT

## 2020-01-01 PROCEDURE — 99232 SBSQ HOSP IP/OBS MODERATE 35: CPT | Performed by: INTERNAL MEDICINE

## 2020-01-01 PROCEDURE — 83880 ASSAY OF NATRIURETIC PEPTIDE: CPT

## 2020-01-01 PROCEDURE — 6370000000 HC RX 637 (ALT 250 FOR IP): Performed by: NURSE PRACTITIONER

## 2020-01-01 PROCEDURE — 93005 ELECTROCARDIOGRAM TRACING: CPT | Performed by: INTERNAL MEDICINE

## 2020-01-01 PROCEDURE — 99231 SBSQ HOSP IP/OBS SF/LOW 25: CPT | Performed by: PHYSICIAN ASSISTANT

## 2020-01-01 PROCEDURE — 86140 C-REACTIVE PROTEIN: CPT

## 2020-01-01 PROCEDURE — 84132 ASSAY OF SERUM POTASSIUM: CPT

## 2020-01-01 PROCEDURE — 6360000002 HC RX W HCPCS: Performed by: FAMILY MEDICINE

## 2020-01-01 PROCEDURE — 96374 THER/PROPH/DIAG INJ IV PUSH: CPT

## 2020-01-01 PROCEDURE — 83735 ASSAY OF MAGNESIUM: CPT

## 2020-01-01 PROCEDURE — 71045 X-RAY EXAM CHEST 1 VIEW: CPT

## 2020-01-01 PROCEDURE — 83615 LACTATE (LD) (LDH) ENZYME: CPT

## 2020-01-01 PROCEDURE — 51798 US URINE CAPACITY MEASURE: CPT

## 2020-01-01 PROCEDURE — 6360000002 HC RX W HCPCS

## 2020-01-01 PROCEDURE — 51702 INSERT TEMP BLADDER CATH: CPT

## 2020-01-01 PROCEDURE — 93010 ELECTROCARDIOGRAM REPORT: CPT | Performed by: INTERNAL MEDICINE

## 2020-01-01 PROCEDURE — 99233 SBSQ HOSP IP/OBS HIGH 50: CPT | Performed by: INTERNAL MEDICINE

## 2020-01-01 PROCEDURE — 6360000002 HC RX W HCPCS: Performed by: STUDENT IN AN ORGANIZED HEALTH CARE EDUCATION/TRAINING PROGRAM

## 2020-01-01 PROCEDURE — U0002 COVID-19 LAB TEST NON-CDC: HCPCS

## 2020-01-01 PROCEDURE — 85651 RBC SED RATE NONAUTOMATED: CPT

## 2020-01-01 PROCEDURE — 6370000000 HC RX 637 (ALT 250 FOR IP): Performed by: STUDENT IN AN ORGANIZED HEALTH CARE EDUCATION/TRAINING PROGRAM

## 2020-01-01 PROCEDURE — 71250 CT THORAX DX C-: CPT

## 2020-01-01 PROCEDURE — 99222 1ST HOSP IP/OBS MODERATE 55: CPT | Performed by: NURSE PRACTITIONER

## 2020-01-01 PROCEDURE — 84145 PROCALCITONIN (PCT): CPT

## 2020-01-01 PROCEDURE — 93005 ELECTROCARDIOGRAM TRACING: CPT | Performed by: EMERGENCY MEDICINE

## 2020-01-01 PROCEDURE — 84100 ASSAY OF PHOSPHORUS: CPT

## 2020-01-01 PROCEDURE — 85384 FIBRINOGEN ACTIVITY: CPT

## 2020-01-01 PROCEDURE — 85730 THROMBOPLASTIN TIME PARTIAL: CPT

## 2020-01-01 PROCEDURE — 82728 ASSAY OF FERRITIN: CPT

## 2020-01-01 RX ORDER — 0.9 % SODIUM CHLORIDE 0.9 %
30 INTRAVENOUS SOLUTION INTRAVENOUS PRN
Status: DISCONTINUED | OUTPATIENT
Start: 2020-01-01 | End: 2021-01-01 | Stop reason: HOSPADM

## 2020-01-01 RX ORDER — POLYETHYLENE GLYCOL 3350 17 G/17G
17 POWDER, FOR SOLUTION ORAL DAILY PRN
Status: DISCONTINUED | OUTPATIENT
Start: 2020-01-01 | End: 2021-01-01 | Stop reason: HOSPADM

## 2020-01-01 RX ORDER — DEXAMETHASONE SODIUM PHOSPHATE 10 MG/ML
6 INJECTION, SOLUTION INTRAMUSCULAR; INTRAVENOUS EVERY 6 HOURS
Status: DISCONTINUED | OUTPATIENT
Start: 2020-01-01 | End: 2020-01-01

## 2020-01-01 RX ORDER — ASCORBIC ACID 500 MG
500 TABLET ORAL DAILY
Status: DISCONTINUED | OUTPATIENT
Start: 2020-01-01 | End: 2021-01-01 | Stop reason: HOSPADM

## 2020-01-01 RX ORDER — SODIUM CHLORIDE 0.9 % (FLUSH) 0.9 %
10 SYRINGE (ML) INJECTION PRN
Status: DISCONTINUED | OUTPATIENT
Start: 2020-01-01 | End: 2021-01-01 | Stop reason: HOSPADM

## 2020-01-01 RX ORDER — VALSARTAN 160 MG/1
160 TABLET ORAL DAILY
COMMUNITY

## 2020-01-01 RX ORDER — AMITRIPTYLINE HYDROCHLORIDE 25 MG/1
25 TABLET, FILM COATED ORAL DAILY
Status: DISCONTINUED | OUTPATIENT
Start: 2020-01-01 | End: 2021-01-01 | Stop reason: HOSPADM

## 2020-01-01 RX ORDER — MAGNESIUM SULFATE IN WATER 40 MG/ML
2 INJECTION, SOLUTION INTRAVENOUS ONCE
Status: COMPLETED | OUTPATIENT
Start: 2020-01-01 | End: 2020-01-01

## 2020-01-01 RX ORDER — MORPHINE SULFATE 2 MG/ML
1 INJECTION, SOLUTION INTRAMUSCULAR; INTRAVENOUS EVERY 4 HOURS PRN
Status: DISCONTINUED | OUTPATIENT
Start: 2020-01-01 | End: 2021-01-01

## 2020-01-01 RX ORDER — POTASSIUM CHLORIDE 7.45 MG/ML
10 INJECTION INTRAVENOUS
Status: DISPENSED | OUTPATIENT
Start: 2020-01-01 | End: 2020-01-01

## 2020-01-01 RX ORDER — 0.9 % SODIUM CHLORIDE 0.9 %
30 INTRAVENOUS SOLUTION INTRAVENOUS PRN
Status: DISCONTINUED | OUTPATIENT
Start: 2020-01-01 | End: 2020-01-01 | Stop reason: SDUPTHER

## 2020-01-01 RX ORDER — ZINC SULFATE 50(220)MG
50 CAPSULE ORAL DAILY
Status: DISCONTINUED | OUTPATIENT
Start: 2020-01-01 | End: 2021-01-01 | Stop reason: HOSPADM

## 2020-01-01 RX ORDER — ALBUTEROL SULFATE 90 UG/1
2 AEROSOL, METERED RESPIRATORY (INHALATION) EVERY 6 HOURS PRN
Status: DISCONTINUED | OUTPATIENT
Start: 2020-01-01 | End: 2021-01-01 | Stop reason: HOSPADM

## 2020-01-01 RX ORDER — FUROSEMIDE 10 MG/ML
20 INJECTION INTRAMUSCULAR; INTRAVENOUS ONCE
Status: COMPLETED | OUTPATIENT
Start: 2020-01-01 | End: 2020-01-01

## 2020-01-01 RX ORDER — PERPHENAZINE 2 MG/1
4 TABLET, FILM COATED ORAL DAILY
Status: DISCONTINUED | OUTPATIENT
Start: 2020-01-01 | End: 2021-01-01 | Stop reason: HOSPADM

## 2020-01-01 RX ORDER — TAMSULOSIN HYDROCHLORIDE 0.4 MG/1
0.4 CAPSULE ORAL DAILY
Status: DISCONTINUED | OUTPATIENT
Start: 2020-01-01 | End: 2021-01-01 | Stop reason: HOSPADM

## 2020-01-01 RX ORDER — AMLODIPINE BESYLATE 5 MG/1
5 TABLET ORAL DAILY
Status: DISCONTINUED | OUTPATIENT
Start: 2020-01-01 | End: 2021-01-01 | Stop reason: HOSPADM

## 2020-01-01 RX ORDER — POTASSIUM CHLORIDE 7.45 MG/ML
INJECTION INTRAVENOUS
Status: DISPENSED
Start: 2020-01-01 | End: 2020-01-01

## 2020-01-01 RX ORDER — POTASSIUM CHLORIDE 7.45 MG/ML
10 INJECTION INTRAVENOUS
Status: COMPLETED | OUTPATIENT
Start: 2020-01-01 | End: 2020-01-01

## 2020-01-01 RX ORDER — ACETAMINOPHEN 650 MG/1
650 SUPPOSITORY RECTAL EVERY 6 HOURS PRN
Status: DISCONTINUED | OUTPATIENT
Start: 2020-01-01 | End: 2021-01-01 | Stop reason: HOSPADM

## 2020-01-01 RX ORDER — ACETAMINOPHEN 325 MG/1
650 TABLET ORAL EVERY 6 HOURS PRN
Status: DISCONTINUED | OUTPATIENT
Start: 2020-01-01 | End: 2021-01-01 | Stop reason: HOSPADM

## 2020-01-01 RX ORDER — VITAMIN B COMPLEX
2000 TABLET ORAL DAILY
Status: DISCONTINUED | OUTPATIENT
Start: 2020-01-01 | End: 2021-01-01 | Stop reason: HOSPADM

## 2020-01-01 RX ORDER — MORPHINE SULFATE 20 MG/ML
2.5 SOLUTION ORAL EVERY 6 HOURS PRN
Status: DISCONTINUED | OUTPATIENT
Start: 2020-01-01 | End: 2021-01-01 | Stop reason: HOSPADM

## 2020-01-01 RX ORDER — SODIUM CHLORIDE 0.9 % (FLUSH) 0.9 %
10 SYRINGE (ML) INJECTION PRN
Status: DISCONTINUED | OUTPATIENT
Start: 2020-01-01 | End: 2020-01-01

## 2020-01-01 RX ORDER — ONDANSETRON 2 MG/ML
4 INJECTION INTRAMUSCULAR; INTRAVENOUS EVERY 6 HOURS PRN
Status: DISCONTINUED | OUTPATIENT
Start: 2020-01-01 | End: 2021-01-01 | Stop reason: HOSPADM

## 2020-01-01 RX ORDER — SODIUM CHLORIDE 9 MG/ML
INJECTION, SOLUTION INTRAVENOUS CONTINUOUS
Status: ACTIVE | OUTPATIENT
Start: 2020-01-01 | End: 2020-01-01

## 2020-01-01 RX ORDER — SODIUM CHLORIDE 0.9 % (FLUSH) 0.9 %
10 SYRINGE (ML) INJECTION EVERY 12 HOURS SCHEDULED
Status: DISCONTINUED | OUTPATIENT
Start: 2020-01-01 | End: 2021-01-01 | Stop reason: HOSPADM

## 2020-01-01 RX ORDER — POTASSIUM CHLORIDE 20 MEQ/1
40 TABLET, EXTENDED RELEASE ORAL ONCE
Status: COMPLETED | OUTPATIENT
Start: 2020-01-01 | End: 2020-01-01

## 2020-01-01 RX ORDER — PROMETHAZINE HYDROCHLORIDE 25 MG/1
12.5 TABLET ORAL EVERY 6 HOURS PRN
Status: DISCONTINUED | OUTPATIENT
Start: 2020-01-01 | End: 2021-01-01 | Stop reason: HOSPADM

## 2020-01-01 RX ORDER — PANTOPRAZOLE SODIUM 40 MG/1
40 TABLET, DELAYED RELEASE ORAL
Status: DISCONTINUED | OUTPATIENT
Start: 2020-01-01 | End: 2021-01-01 | Stop reason: HOSPADM

## 2020-01-01 RX ORDER — GUAIFENESIN/DEXTROMETHORPHAN 100-10MG/5
5 SYRUP ORAL EVERY 4 HOURS PRN
Status: DISCONTINUED | OUTPATIENT
Start: 2020-01-01 | End: 2021-01-01 | Stop reason: HOSPADM

## 2020-01-01 RX ORDER — FUROSEMIDE 10 MG/ML
40 INJECTION INTRAMUSCULAR; INTRAVENOUS DAILY
Status: DISCONTINUED | OUTPATIENT
Start: 2021-01-01 | End: 2021-01-01 | Stop reason: HOSPADM

## 2020-01-01 RX ADMIN — Medication 10 ML: at 21:55

## 2020-01-01 RX ADMIN — REMDESIVIR 100 MG: 100 INJECTION, POWDER, LYOPHILIZED, FOR SOLUTION INTRAVENOUS at 08:31

## 2020-01-01 RX ADMIN — METOPROLOL TARTRATE 25 MG: 25 TABLET, FILM COATED ORAL at 19:57

## 2020-01-01 RX ADMIN — AMITRIPTYLINE HYDROCHLORIDE 25 MG: 25 TABLET, FILM COATED ORAL at 08:15

## 2020-01-01 RX ADMIN — AMITRIPTYLINE HYDROCHLORIDE 25 MG: 25 TABLET, FILM COATED ORAL at 10:32

## 2020-01-01 RX ADMIN — AMITRIPTYLINE HYDROCHLORIDE 25 MG: 25 TABLET, FILM COATED ORAL at 09:48

## 2020-01-01 RX ADMIN — ZINC SULFATE 220 MG (50 MG) CAPSULE 50 MG: CAPSULE at 08:15

## 2020-01-01 RX ADMIN — Medication 2000 UNITS: at 10:07

## 2020-01-01 RX ADMIN — TAMSULOSIN HYDROCHLORIDE 0.4 MG: 0.4 CAPSULE ORAL at 09:45

## 2020-01-01 RX ADMIN — METOPROLOL TARTRATE 25 MG: 25 TABLET, FILM COATED ORAL at 22:14

## 2020-01-01 RX ADMIN — ENOXAPARIN SODIUM 40 MG: 40 INJECTION SUBCUTANEOUS at 19:01

## 2020-01-01 RX ADMIN — Medication 10 ML: at 19:57

## 2020-01-01 RX ADMIN — Medication 10 ML: at 10:33

## 2020-01-01 RX ADMIN — Medication 10 ML: at 20:20

## 2020-01-01 RX ADMIN — Medication 500 MG: at 17:25

## 2020-01-01 RX ADMIN — ACETAMINOPHEN 650 MG: 325 TABLET ORAL at 10:39

## 2020-01-01 RX ADMIN — DEXAMETHASONE 6 MG: 4 TABLET ORAL at 09:45

## 2020-01-01 RX ADMIN — AMITRIPTYLINE HYDROCHLORIDE 25 MG: 25 TABLET, FILM COATED ORAL at 11:10

## 2020-01-01 RX ADMIN — Medication 2000 UNITS: at 08:15

## 2020-01-01 RX ADMIN — METOPROLOL TARTRATE 25 MG: 25 TABLET, FILM COATED ORAL at 08:49

## 2020-01-01 RX ADMIN — DEXAMETHASONE 6 MG: 4 TABLET ORAL at 08:30

## 2020-01-01 RX ADMIN — Medication 10 ML: at 10:42

## 2020-01-01 RX ADMIN — ENOXAPARIN SODIUM 40 MG: 40 INJECTION SUBCUTANEOUS at 17:12

## 2020-01-01 RX ADMIN — ENOXAPARIN SODIUM 40 MG: 40 INJECTION SUBCUTANEOUS at 17:06

## 2020-01-01 RX ADMIN — ZINC SULFATE 220 MG (50 MG) CAPSULE 50 MG: CAPSULE at 08:27

## 2020-01-01 RX ADMIN — METOPROLOL TARTRATE 25 MG: 25 TABLET, FILM COATED ORAL at 08:05

## 2020-01-01 RX ADMIN — Medication 500 MG: at 08:05

## 2020-01-01 RX ADMIN — Medication 10 ML: at 10:40

## 2020-01-01 RX ADMIN — ZINC SULFATE 220 MG (50 MG) CAPSULE 50 MG: CAPSULE at 08:49

## 2020-01-01 RX ADMIN — ZINC SULFATE 220 MG (50 MG) CAPSULE 50 MG: CAPSULE at 09:48

## 2020-01-01 RX ADMIN — DEXAMETHASONE 6 MG: 4 TABLET ORAL at 19:56

## 2020-01-01 RX ADMIN — Medication 500 MG: at 10:07

## 2020-01-01 RX ADMIN — REMDESIVIR 100 MG: 100 INJECTION, POWDER, LYOPHILIZED, FOR SOLUTION INTRAVENOUS at 10:07

## 2020-01-01 RX ADMIN — Medication 10 ML: at 09:48

## 2020-01-01 RX ADMIN — DEXAMETHASONE 6 MG: 4 TABLET ORAL at 08:05

## 2020-01-01 RX ADMIN — METOPROLOL TARTRATE 25 MG: 25 TABLET, FILM COATED ORAL at 08:15

## 2020-01-01 RX ADMIN — FUROSEMIDE 20 MG: 10 INJECTION, SOLUTION INTRAMUSCULAR; INTRAVENOUS at 17:15

## 2020-01-01 RX ADMIN — ACETAMINOPHEN 650 MG: 325 TABLET ORAL at 20:10

## 2020-01-01 RX ADMIN — TAMSULOSIN HYDROCHLORIDE 0.4 MG: 0.4 CAPSULE ORAL at 10:42

## 2020-01-01 RX ADMIN — Medication 2000 UNITS: at 15:56

## 2020-01-01 RX ADMIN — ENOXAPARIN SODIUM 40 MG: 40 INJECTION SUBCUTANEOUS at 16:55

## 2020-01-01 RX ADMIN — AMITRIPTYLINE HYDROCHLORIDE 25 MG: 25 TABLET, FILM COATED ORAL at 08:30

## 2020-01-01 RX ADMIN — DEXAMETHASONE 6 MG: 4 TABLET ORAL at 12:53

## 2020-01-01 RX ADMIN — MORPHINE SULFATE 1 MG: 2 INJECTION, SOLUTION INTRAMUSCULAR; INTRAVENOUS at 18:14

## 2020-01-01 RX ADMIN — ONDANSETRON 4 MG: 2 INJECTION INTRAMUSCULAR; INTRAVENOUS at 17:56

## 2020-01-01 RX ADMIN — PANTOPRAZOLE SODIUM 40 MG: 40 TABLET, DELAYED RELEASE ORAL at 06:34

## 2020-01-01 RX ADMIN — AMLODIPINE BESYLATE 5 MG: 5 TABLET ORAL at 17:25

## 2020-01-01 RX ADMIN — Medication 500 MG: at 08:27

## 2020-01-01 RX ADMIN — PERPHENAZINE 4 MG: 2 TABLET, FILM COATED ORAL at 08:27

## 2020-01-01 RX ADMIN — Medication 500 MG: at 08:30

## 2020-01-01 RX ADMIN — Medication 10 ML: at 10:08

## 2020-01-01 RX ADMIN — DEXAMETHASONE 6 MG: 4 TABLET ORAL at 09:48

## 2020-01-01 RX ADMIN — METOPROLOL TARTRATE 25 MG: 25 TABLET, FILM COATED ORAL at 23:28

## 2020-01-01 RX ADMIN — PANTOPRAZOLE SODIUM 40 MG: 40 TABLET, DELAYED RELEASE ORAL at 06:43

## 2020-01-01 RX ADMIN — METOPROLOL TARTRATE 25 MG: 25 TABLET, FILM COATED ORAL at 20:10

## 2020-01-01 RX ADMIN — SODIUM CHLORIDE: 9 INJECTION, SOLUTION INTRAVENOUS at 16:30

## 2020-01-01 RX ADMIN — METOPROLOL TARTRATE 25 MG: 25 TABLET, FILM COATED ORAL at 22:17

## 2020-01-01 RX ADMIN — METOPROLOL TARTRATE 25 MG: 25 TABLET, FILM COATED ORAL at 20:07

## 2020-01-01 RX ADMIN — Medication 2000 UNITS: at 08:30

## 2020-01-01 RX ADMIN — DEXAMETHASONE 6 MG: 4 TABLET ORAL at 20:07

## 2020-01-01 RX ADMIN — ZINC SULFATE 220 MG (50 MG) CAPSULE 50 MG: CAPSULE at 10:39

## 2020-01-01 RX ADMIN — AMITRIPTYLINE HYDROCHLORIDE 25 MG: 25 TABLET, FILM COATED ORAL at 10:08

## 2020-01-01 RX ADMIN — AMITRIPTYLINE HYDROCHLORIDE 25 MG: 25 TABLET, FILM COATED ORAL at 08:05

## 2020-01-01 RX ADMIN — AMLODIPINE BESYLATE 5 MG: 5 TABLET ORAL at 09:45

## 2020-01-01 RX ADMIN — DEXAMETHASONE 6 MG: 4 TABLET ORAL at 19:49

## 2020-01-01 RX ADMIN — AMITRIPTYLINE HYDROCHLORIDE 25 MG: 25 TABLET, FILM COATED ORAL at 08:49

## 2020-01-01 RX ADMIN — DEXAMETHASONE 6 MG: 4 TABLET ORAL at 10:08

## 2020-01-01 RX ADMIN — Medication 10 ML: at 22:14

## 2020-01-01 RX ADMIN — ZINC SULFATE 220 MG (50 MG) CAPSULE 50 MG: CAPSULE at 10:07

## 2020-01-01 RX ADMIN — MORPHINE SULFATE 2.5 MG: 100 SOLUTION ORAL at 01:18

## 2020-01-01 RX ADMIN — ZINC SULFATE 220 MG (50 MG) CAPSULE 50 MG: CAPSULE at 08:30

## 2020-01-01 RX ADMIN — PERPHENAZINE 4 MG: 2 TABLET, FILM COATED ORAL at 08:15

## 2020-01-01 RX ADMIN — AMLODIPINE BESYLATE 5 MG: 5 TABLET ORAL at 08:05

## 2020-01-01 RX ADMIN — AMITRIPTYLINE HYDROCHLORIDE 25 MG: 25 TABLET, FILM COATED ORAL at 10:41

## 2020-01-01 RX ADMIN — AMLODIPINE BESYLATE 5 MG: 5 TABLET ORAL at 10:08

## 2020-01-01 RX ADMIN — AMLODIPINE BESYLATE 5 MG: 5 TABLET ORAL at 11:10

## 2020-01-01 RX ADMIN — METOPROLOL TARTRATE 25 MG: 25 TABLET, FILM COATED ORAL at 10:32

## 2020-01-01 RX ADMIN — METOPROLOL TARTRATE 25 MG: 25 TABLET, FILM COATED ORAL at 20:11

## 2020-01-01 RX ADMIN — Medication 500 MG: at 10:32

## 2020-01-01 RX ADMIN — ENOXAPARIN SODIUM 40 MG: 40 INJECTION SUBCUTANEOUS at 17:56

## 2020-01-01 RX ADMIN — Medication 10 ML: at 23:29

## 2020-01-01 RX ADMIN — PERPHENAZINE 4 MG: 2 TABLET, FILM COATED ORAL at 10:07

## 2020-01-01 RX ADMIN — Medication 2000 UNITS: at 08:49

## 2020-01-01 RX ADMIN — PANTOPRAZOLE SODIUM 40 MG: 40 TABLET, DELAYED RELEASE ORAL at 05:37

## 2020-01-01 RX ADMIN — DEXAMETHASONE 6 MG: 4 TABLET ORAL at 08:15

## 2020-01-01 RX ADMIN — PERPHENAZINE 4 MG: 2 TABLET, FILM COATED ORAL at 08:05

## 2020-01-01 RX ADMIN — Medication 10 ML: at 08:31

## 2020-01-01 RX ADMIN — PANTOPRAZOLE SODIUM 40 MG: 40 TABLET, DELAYED RELEASE ORAL at 05:22

## 2020-01-01 RX ADMIN — ENOXAPARIN SODIUM 40 MG: 40 INJECTION SUBCUTANEOUS at 18:29

## 2020-01-01 RX ADMIN — Medication 10 ML: at 08:06

## 2020-01-01 RX ADMIN — AMLODIPINE BESYLATE 5 MG: 5 TABLET ORAL at 10:32

## 2020-01-01 RX ADMIN — METOPROLOL TARTRATE 25 MG: 25 TABLET, FILM COATED ORAL at 09:44

## 2020-01-01 RX ADMIN — Medication 2000 UNITS: at 08:27

## 2020-01-01 RX ADMIN — Medication 500 MG: at 08:49

## 2020-01-01 RX ADMIN — PERPHENAZINE 4 MG: 2 TABLET, FILM COATED ORAL at 11:10

## 2020-01-01 RX ADMIN — PANTOPRAZOLE SODIUM 40 MG: 40 TABLET, DELAYED RELEASE ORAL at 06:37

## 2020-01-01 RX ADMIN — Medication 2000 UNITS: at 10:39

## 2020-01-01 RX ADMIN — Medication 10 ML: at 20:11

## 2020-01-01 RX ADMIN — Medication 10 ML: at 13:49

## 2020-01-01 RX ADMIN — METOPROLOL TARTRATE 25 MG: 25 TABLET, FILM COATED ORAL at 10:40

## 2020-01-01 RX ADMIN — FUROSEMIDE 20 MG: 10 INJECTION, SOLUTION INTRAVENOUS at 11:00

## 2020-01-01 RX ADMIN — ENOXAPARIN SODIUM 30 MG: 30 INJECTION, SOLUTION INTRAVENOUS; SUBCUTANEOUS at 10:33

## 2020-01-01 RX ADMIN — Medication 2000 UNITS: at 11:09

## 2020-01-01 RX ADMIN — METOPROLOL TARTRATE 25 MG: 25 TABLET, FILM COATED ORAL at 08:27

## 2020-01-01 RX ADMIN — DEXAMETHASONE 6 MG: 4 TABLET ORAL at 08:49

## 2020-01-01 RX ADMIN — Medication 10 ML: at 22:02

## 2020-01-01 RX ADMIN — POTASSIUM CHLORIDE 10 MEQ: 7.46 INJECTION, SOLUTION INTRAVENOUS at 13:45

## 2020-01-01 RX ADMIN — Medication 500 MG: at 11:10

## 2020-01-01 RX ADMIN — ZINC SULFATE 220 MG (50 MG) CAPSULE 50 MG: CAPSULE at 10:32

## 2020-01-01 RX ADMIN — METOPROLOL TARTRATE 25 MG: 25 TABLET, FILM COATED ORAL at 19:50

## 2020-01-01 RX ADMIN — Medication 2000 UNITS: at 10:42

## 2020-01-01 RX ADMIN — ZINC SULFATE 220 MG (50 MG) CAPSULE 50 MG: CAPSULE at 08:05

## 2020-01-01 RX ADMIN — PANTOPRAZOLE SODIUM 40 MG: 40 TABLET, DELAYED RELEASE ORAL at 06:25

## 2020-01-01 RX ADMIN — PANTOPRAZOLE SODIUM 40 MG: 40 TABLET, DELAYED RELEASE ORAL at 09:44

## 2020-01-01 RX ADMIN — DEXAMETHASONE 6 MG: 4 TABLET ORAL at 10:40

## 2020-01-01 RX ADMIN — PERPHENAZINE 4 MG: 2 TABLET, FILM COATED ORAL at 10:32

## 2020-01-01 RX ADMIN — ENOXAPARIN SODIUM 30 MG: 30 INJECTION, SOLUTION INTRAVENOUS; SUBCUTANEOUS at 20:41

## 2020-01-01 RX ADMIN — DEXAMETHASONE 6 MG: 4 TABLET ORAL at 22:17

## 2020-01-01 RX ADMIN — Medication 10 ML: at 08:28

## 2020-01-01 RX ADMIN — Medication 500 MG: at 08:15

## 2020-01-01 RX ADMIN — ENOXAPARIN SODIUM 40 MG: 40 INJECTION SUBCUTANEOUS at 16:58

## 2020-01-01 RX ADMIN — AMITRIPTYLINE HYDROCHLORIDE 25 MG: 25 TABLET, FILM COATED ORAL at 17:26

## 2020-01-01 RX ADMIN — METOPROLOL TARTRATE 25 MG: 25 TABLET, FILM COATED ORAL at 11:10

## 2020-01-01 RX ADMIN — ZINC SULFATE 220 MG (50 MG) CAPSULE 50 MG: CAPSULE at 11:10

## 2020-01-01 RX ADMIN — PERPHENAZINE 4 MG: 2 TABLET, FILM COATED ORAL at 08:49

## 2020-01-01 RX ADMIN — Medication 2000 UNITS: at 08:05

## 2020-01-01 RX ADMIN — DEXAMETHASONE 6 MG: 4 TABLET ORAL at 11:10

## 2020-01-01 RX ADMIN — AMLODIPINE BESYLATE 5 MG: 5 TABLET ORAL at 08:15

## 2020-01-01 RX ADMIN — ENOXAPARIN SODIUM 40 MG: 40 INJECTION SUBCUTANEOUS at 16:22

## 2020-01-01 RX ADMIN — METOPROLOL TARTRATE 25 MG: 25 TABLET, FILM COATED ORAL at 20:19

## 2020-01-01 RX ADMIN — MORPHINE SULFATE 1 MG: 2 INJECTION, SOLUTION INTRAMUSCULAR; INTRAVENOUS at 12:54

## 2020-01-01 RX ADMIN — REMDESIVIR 100 MG: 100 INJECTION, POWDER, LYOPHILIZED, FOR SOLUTION INTRAVENOUS at 08:15

## 2020-01-01 RX ADMIN — TOCILIZUMAB 450 MG: 20 INJECTION, SOLUTION, CONCENTRATE INTRAVENOUS at 11:13

## 2020-01-01 RX ADMIN — PERPHENAZINE 4 MG: 2 TABLET, FILM COATED ORAL at 17:26

## 2020-01-01 RX ADMIN — Medication 2000 UNITS: at 09:45

## 2020-01-01 RX ADMIN — PERPHENAZINE 4 MG: 2 TABLET, FILM COATED ORAL at 10:40

## 2020-01-01 RX ADMIN — AMITRIPTYLINE HYDROCHLORIDE 25 MG: 25 TABLET, FILM COATED ORAL at 08:27

## 2020-01-01 RX ADMIN — TAMSULOSIN HYDROCHLORIDE 0.4 MG: 0.4 CAPSULE ORAL at 17:13

## 2020-01-01 RX ADMIN — Medication 10 ML: at 11:11

## 2020-01-01 RX ADMIN — DEXAMETHASONE 6 MG: 4 TABLET ORAL at 10:41

## 2020-01-01 RX ADMIN — PANTOPRAZOLE SODIUM 40 MG: 40 TABLET, DELAYED RELEASE ORAL at 08:27

## 2020-01-01 RX ADMIN — AMLODIPINE BESYLATE 5 MG: 5 TABLET ORAL at 10:42

## 2020-01-01 RX ADMIN — REMDESIVIR 200 MG: 100 INJECTION, POWDER, LYOPHILIZED, FOR SOLUTION INTRAVENOUS at 02:00

## 2020-01-01 RX ADMIN — ACETAMINOPHEN 650 MG: 325 TABLET ORAL at 20:19

## 2020-01-01 RX ADMIN — Medication 500 MG: at 09:45

## 2020-01-01 RX ADMIN — ENOXAPARIN SODIUM 40 MG: 40 INJECTION SUBCUTANEOUS at 17:24

## 2020-01-01 RX ADMIN — AMITRIPTYLINE HYDROCHLORIDE 25 MG: 25 TABLET, FILM COATED ORAL at 09:44

## 2020-01-01 RX ADMIN — TAMSULOSIN HYDROCHLORIDE 0.4 MG: 0.4 CAPSULE ORAL at 10:33

## 2020-01-01 RX ADMIN — ALBUTEROL SULFATE 2 PUFF: 90 AEROSOL, METERED RESPIRATORY (INHALATION) at 01:22

## 2020-01-01 RX ADMIN — PANTOPRAZOLE SODIUM 40 MG: 40 TABLET, DELAYED RELEASE ORAL at 11:10

## 2020-01-01 RX ADMIN — AMLODIPINE BESYLATE 5 MG: 5 TABLET ORAL at 08:49

## 2020-01-01 RX ADMIN — ACETAMINOPHEN 650 MG: 325 TABLET ORAL at 08:27

## 2020-01-01 RX ADMIN — Medication 500 MG: at 10:41

## 2020-01-01 RX ADMIN — POTASSIUM CHLORIDE 40 MEQ: 20 TABLET, EXTENDED RELEASE ORAL at 13:45

## 2020-01-01 RX ADMIN — Medication 500 MG: at 10:40

## 2020-01-01 RX ADMIN — AMLODIPINE BESYLATE 5 MG: 5 TABLET ORAL at 08:30

## 2020-01-01 RX ADMIN — Medication 10 ML: at 19:50

## 2020-01-01 RX ADMIN — Medication 10 ML: at 08:15

## 2020-01-01 RX ADMIN — ENOXAPARIN SODIUM 30 MG: 30 INJECTION, SOLUTION INTRAVENOUS; SUBCUTANEOUS at 21:27

## 2020-01-01 RX ADMIN — PERPHENAZINE 4 MG: 2 TABLET, FILM COATED ORAL at 09:48

## 2020-01-01 RX ADMIN — Medication 10 ML: at 20:07

## 2020-01-01 RX ADMIN — Medication 10 ML: at 22:17

## 2020-01-01 RX ADMIN — DEXAMETHASONE 6 MG: 4 TABLET ORAL at 17:25

## 2020-01-01 RX ADMIN — MAGNESIUM SULFATE HEPTAHYDRATE 2 G: 40 INJECTION, SOLUTION INTRAVENOUS at 13:46

## 2020-01-01 RX ADMIN — METOPROLOL TARTRATE 25 MG: 25 TABLET, FILM COATED ORAL at 08:31

## 2020-01-01 RX ADMIN — METOPROLOL TARTRATE 25 MG: 25 TABLET, FILM COATED ORAL at 10:07

## 2020-01-01 RX ADMIN — ZINC SULFATE 220 MG (50 MG) CAPSULE 50 MG: CAPSULE at 12:50

## 2020-01-01 RX ADMIN — ZINC SULFATE 220 MG (50 MG) CAPSULE 50 MG: CAPSULE at 09:44

## 2020-01-01 RX ADMIN — POTASSIUM CHLORIDE 10 MEQ: 10 INJECTION, SOLUTION INTRAVENOUS at 17:26

## 2020-01-01 RX ADMIN — METOPROLOL TARTRATE 25 MG: 25 TABLET, FILM COATED ORAL at 09:48

## 2020-01-01 RX ADMIN — AMLODIPINE BESYLATE 5 MG: 5 TABLET ORAL at 10:40

## 2020-01-01 RX ADMIN — Medication 10 ML: at 09:45

## 2020-01-01 RX ADMIN — DEXAMETHASONE 6 MG: 4 TABLET ORAL at 19:57

## 2020-01-01 RX ADMIN — REMDESIVIR 100 MG: 100 INJECTION, POWDER, LYOPHILIZED, FOR SOLUTION INTRAVENOUS at 09:49

## 2020-01-01 RX ADMIN — ZINC SULFATE 220 MG (50 MG) CAPSULE 50 MG: CAPSULE at 17:25

## 2020-01-01 RX ADMIN — AMLODIPINE BESYLATE 5 MG: 5 TABLET ORAL at 08:27

## 2020-01-01 RX ADMIN — PANTOPRAZOLE SODIUM 40 MG: 40 TABLET, DELAYED RELEASE ORAL at 08:06

## 2020-01-01 RX ADMIN — ENOXAPARIN SODIUM 40 MG: 40 INJECTION SUBCUTANEOUS at 20:19

## 2020-01-01 RX ADMIN — AMITRIPTYLINE HYDROCHLORIDE 25 MG: 25 TABLET, FILM COATED ORAL at 10:40

## 2020-01-01 RX ADMIN — Medication 500 MG: at 09:48

## 2020-01-01 RX ADMIN — Medication 2000 UNITS: at 10:32

## 2020-01-01 RX ADMIN — DEXAMETHASONE 6 MG: 4 TABLET ORAL at 08:27

## 2020-01-01 RX ADMIN — METOPROLOL TARTRATE 25 MG: 25 TABLET, FILM COATED ORAL at 10:42

## 2020-01-01 RX ADMIN — PERPHENAZINE 4 MG: 2 TABLET, FILM COATED ORAL at 08:30

## 2020-01-01 RX ADMIN — ENOXAPARIN SODIUM 40 MG: 40 INJECTION SUBCUTANEOUS at 15:55

## 2020-01-01 RX ADMIN — PERPHENAZINE 4 MG: 2 TABLET, FILM COATED ORAL at 10:41

## 2020-01-01 RX ADMIN — AMLODIPINE BESYLATE 5 MG: 5 TABLET ORAL at 09:48

## 2020-01-01 RX ADMIN — Medication 10 ML: at 20:41

## 2020-01-01 RX ADMIN — DEXAMETHASONE 6 MG: 4 TABLET ORAL at 20:11

## 2020-01-01 RX ADMIN — METOPROLOL TARTRATE 25 MG: 25 TABLET, FILM COATED ORAL at 20:41

## 2020-01-01 RX ADMIN — METOPROLOL TARTRATE 25 MG: 25 TABLET, FILM COATED ORAL at 21:54

## 2020-01-01 RX ADMIN — DEXAMETHASONE SODIUM PHOSPHATE 6 MG: 10 INJECTION, SOLUTION INTRAMUSCULAR; INTRAVENOUS at 12:26

## 2020-01-01 RX ADMIN — POTASSIUM CHLORIDE 10 MEQ: 10 INJECTION, SOLUTION INTRAVENOUS at 22:10

## 2020-01-01 RX ADMIN — DEXAMETHASONE 6 MG: 4 TABLET ORAL at 23:28

## 2020-01-01 RX ADMIN — Medication 2000 UNITS: at 17:25

## 2020-01-01 RX ADMIN — PERPHENAZINE 4 MG: 2 TABLET, FILM COATED ORAL at 09:45

## 2020-01-01 ASSESSMENT — ENCOUNTER SYMPTOMS
COUGH: 1
ABDOMINAL DISTENTION: 0
VOMITING: 0
TROUBLE SWALLOWING: 0
VOICE CHANGE: 0
DIARRHEA: 1
CONSTIPATION: 0
APNEA: 0
NAUSEA: 0
COLOR CHANGE: 0
ABDOMINAL PAIN: 0
CHEST TIGHTNESS: 0
SHORTNESS OF BREATH: 1

## 2020-01-01 ASSESSMENT — PAIN SCALES - GENERAL
PAINLEVEL_OUTOF10: 0
PAINLEVEL_OUTOF10: 3
PAINLEVEL_OUTOF10: 0

## 2020-01-01 ASSESSMENT — PAIN DESCRIPTION - PAIN TYPE: TYPE: ACUTE PAIN

## 2020-12-19 PROBLEM — J96.01 ACUTE RESPIRATORY FAILURE WITH HYPOXIA (HCC): Status: ACTIVE | Noted: 2020-01-01

## 2020-12-19 PROBLEM — J96.21 ACUTE ON CHRONIC RESPIRATORY FAILURE WITH HYPOXIA (HCC): Status: ACTIVE | Noted: 2020-01-01

## 2020-12-19 PROBLEM — U07.1 COVID-19: Status: ACTIVE | Noted: 2020-01-01

## 2020-12-19 NOTE — ED NOTES
Bed: 26  Expected date:   Expected time:   Means of arrival:   Comments:  ems     Rodriguez Benito RN  12/19/20 5733

## 2020-12-19 NOTE — H&P
Rojas, DO   hydrochlorothiazide (HYDRODIURIL) 25 MG tablet Take 1 tablet by mouth daily 7/17/19   Nikolas Amis, DO   aspirin-acetaminophen-caffeine (EXCEDRIN MIGRAINE) 219-856-92 MG per tablet Take 1 tablet by mouth every 6 hours as needed for Headaches    Historical Provider, MD        Allergies   Patient has no known allergies. Social History     Social History     Tobacco History     Smoking Status  Never Smoker    Smokeless Tobacco Use  Never Used          Alcohol History     Alcohol Use Status  Never Comment  no coffee . Drug Use     Drug Use Status  Never          Sexual Activity     Sexually Active  Never                Family History     Family History   Problem Relation Age of Onset    Asthma Father        Review of Systems   14 points review of the system is completed and it is all negative except of what mentioned in the above history of present illness. Physical Exam   /65   Pulse 80   Temp 98.9 °F (37.2 °C) (Oral)   Resp 18   Ht 4' 11\" (1.499 m)   Wt 125 lb (56.7 kg)   SpO2 96%   BMI 25.25 kg/m²     Appearance: Well developed, well nourished elderly lady who appears of stated age. No apparent acute distress. Skin: Intact, no erythema, no rash  Head: Normocephalic, atraumatic. Oral mucosa pink and moist.   Neck: Supple, no elevated JVP, no carotid bruits  Lungs: Diminished sounds on the right base with few crackles. Cardiac: Regular rate and rhythm, no murmurs, S3 or S4, or rubs  Abdomen: Soft, non-tender, non-distended. Bowel sounds present. Extremities: No lower extremity edema, intact posterior tibial pulses bilaterally  Neurologic: Normal mood and affect. Alert and oriented to person, place, time. Speech clear and appropriate. Musculoskeletal: Moves all extremities. No gross muscle atrophy.      Labs      Recent Results (from the past 24 hour(s))   CBC Auto Differential    Collection Time: 12/19/20 11:48 AM   Result Value Ref Range    WBC 5.8 4.5 - 11.5 E9/L RBC 3.77 3.50 - 5.50 E12/L    Hemoglobin 11.4 (L) 11.5 - 15.5 g/dL    Hematocrit 34.6 34.0 - 48.0 %    MCV 91.8 80.0 - 99.9 fL    MCH 30.2 26.0 - 35.0 pg    MCHC 32.9 32.0 - 34.5 %    RDW 13.1 11.5 - 15.0 fL    Platelets 103 (L) 624 - 450 E9/L    MPV 9.3 7.0 - 12.0 fL    Neutrophils % 76.8 43.0 - 80.0 %    Immature Granulocytes % 0.5 0.0 - 5.0 %    Lymphocytes % 17.0 (L) 20.0 - 42.0 %    Monocytes % 5.5 2.0 - 12.0 %    Eosinophils % 0.0 0.0 - 6.0 %    Basophils % 0.2 0.0 - 2.0 %    Neutrophils Absolute 4.44 1.80 - 7.30 E9/L    Immature Granulocytes # 0.03 E9/L    Lymphocytes Absolute 0.98 (L) 1.50 - 4.00 E9/L    Monocytes Absolute 0.32 0.10 - 0.95 E9/L    Eosinophils Absolute 0.00 (L) 0.05 - 0.50 E9/L    Basophils Absolute 0.01 0.00 - 0.20 I6/F   Basic Metabolic Panel    Collection Time: 12/19/20 11:48 AM   Result Value Ref Range    Sodium 137 132 - 146 mmol/L    Potassium 2.5 (LL) 3.5 - 5.0 mmol/L    Chloride 102 98 - 107 mmol/L    CO2 23 22 - 29 mmol/L    Anion Gap 12 7 - 16 mmol/L    Glucose 94 74 - 99 mg/dL    BUN 11 8 - 23 mg/dL    CREATININE 0.7 0.5 - 1.0 mg/dL    GFR Non-African American >60 >=60 mL/min/1.73    GFR African American >60     Calcium 8.1 (L) 8.6 - 10.2 mg/dL   Hepatic Function Panel    Collection Time: 12/19/20 11:48 AM   Result Value Ref Range    Total Protein 6.0 (L) 6.4 - 8.3 g/dL    Alb 3.0 (L) 3.5 - 5.2 g/dL    Alkaline Phosphatase 40 35 - 104 U/L    ALT 10 0 - 32 U/L    AST 32 (H) 0 - 31 U/L    Total Bilirubin 0.4 0.0 - 1.2 mg/dL    Bilirubin, Direct <0.2 0.0 - 0.3 mg/dL    Bilirubin, Indirect see below 0.0 - 1.0 mg/dL   Troponin    Collection Time: 12/19/20 11:48 AM   Result Value Ref Range    Troponin <0.01 0.00 - 0.03 ng/mL   Brain Natriuretic Peptide    Collection Time: 12/19/20 11:48 AM   Result Value Ref Range    Pro- 0 - 450 pg/mL   D-Dimer, Quantitative    Collection Time: 12/19/20 11:48 AM   Result Value Ref Range    D-Dimer, Quant 416 ng/mL DDU   Protime-INR    Collection Time: 12/19/20 11:48 AM   Result Value Ref Range    Protime 13.7 (H) 9.3 - 12.4 sec    INR 1.2    APTT    Collection Time: 12/19/20 11:48 AM   Result Value Ref Range    aPTT 26.5 24.5 - 35.1 sec   Sedimentation Rate    Collection Time: 12/19/20 11:48 AM   Result Value Ref Range    Sed Rate 50 (H) 0 - 20 mm/Hr   Magnesium    Collection Time: 12/19/20 11:48 AM   Result Value Ref Range    Magnesium 1.2 (L) 1.6 - 2.6 mg/dL   COVID-19    Collection Time: 12/19/20 11:48 AM   Result Value Ref Range    SARS-CoV-2, NAAT DETECTED (A) Not Detected        Imaging/Diagnostics Last 24 Hours   Ct Chest Wo Contrast    Result Date: 12/19/2020  EXAMINATION: CT OF THE CHEST WITHOUT CONTRAST 12/19/2020 11:21 am TECHNIQUE: CT of the chest was performed without the administration of intravenous contrast. Multiplanar reformatted images are provided for review. Dose modulation, iterative reconstruction, and/or weight based adjustment of the mA/kV was utilized to reduce the radiation dose to as low as reasonably achievable. COMPARISON: None. HISTORY: ORDERING SYSTEM PROVIDED HISTORY: sob TECHNOLOGIST PROVIDED HISTORY: Reason for exam:->sob FINDINGS: Mediastinum: There are a few small nonspecific lymph nodes seen in the middle mediastinum. No suspicious lymphadenopathy. There is asymmetrical enlargement of the left thyroid lobe or possibly thyroid mass Lungs/pleura: Fabiana Profit There is an interstitial infiltrate with underlying ground-glass opacities in the right upper and middle lobes. There is patchy bibasilar atelectasis/fibrosis with superimposed alveolar infiltrate in the left lower lobe. .  No pulmonary masses are noted. No pleural effusions are identified. Cardiomediastinal Structures: Coronary arterial calcifications are seen. Intimal calcifications associated with the thoracic aorta. Cardiac chambers are normal.  Mild pericardial thickening. Upper Abdomen: Small hiatal hernia. 6 cm right renal cyst Soft Tissues/Bones:  There are hypertrophic degenerative changes in the thoracic spine. No acute osseous abnormalities. Patchy bilateral alveolar and interstitial infiltrates most notably in the right upper, middle and left lower lobes probably representing pneumonia. Other infectious/inflammatory processes such as COVID-19 are also considered. Coronary artery/atherosclerotic disease FINDINGS: Mediastinum: Lungs/pleura: Upper Abdomen: Soft Tissues/Bones:    Xr Chest Portable    Result Date: 12/19/2020  EXAMINATION: ONE XRAY VIEW OF THE CHEST 12/19/2020 11:45 am COMPARISON: None. HISTORY: ORDERING SYSTEM PROVIDED HISTORY: shortness of breath, known hx of Lung CA TECHNOLOGIST PROVIDED HISTORY: Reason for exam:->shortness of breath, known hx of Lung CA FINDINGS: There is a vague ill-defined the ground-glass density in the right parahilar region and there is some subsegmental atelectasis in the left base. There is no pleural effusions. The heart is normal size. There is no perihilar vascular congestion. Vague ill-defined infiltrate in the right parahilar region. The focal pneumonia considered.       Assessment      Hospital Problems           Last Modified POA    Acute respiratory failure with hypoxia ([address] 12/19/2020 Yes    COVID-19 12/19/2020 Yes          Plan   Assessment and plan:  1- Acute hypoxemic respiratory failure secondary to COVID-19.  2- Hypokalemia and hypomagnesemia  3- History of SVT currently in sinus rhythm  4- Essential hypertension    Plan of care:  Admit the patient to telemetry and keep on supplemental oxygen, continue droplet isolation and start the patient on Decadron, vitamin D, vitamin C, and zinc, we will place the patient on Lovenox for DVT prophylaxis and pantoprazole for GI prophylaxis, we will resume home medication except hydrochlorothiazide because of the electrolytes abnormalities, we will continue with amlodipine and metoprolol for hypertension and adjust doses based on the control of the blood

## 2020-12-19 NOTE — ED PROVIDER NOTES
[facility] ENCOUNTER      Pt Name: Manuel Madison  MRN: [id number]  [place] 5/6/1929  Date of evaluation: 12/19/2020      CHIEF COMPLAINT       Chief Complaint   Patient presents with    Fatigue     pt states that she has been feeling ill for the past 2 weeks, sob, cough, hx of ashtma, chills, lives with son who has covid    Dizziness    Cough         HPI  Manuel Madison is a 80 y.o. female with a past medical history of hypertension presents with complaints of fatigue, dizziness, and cough for the last 2 weeks. Patient states for the last 3 days though she has had progressively worsening shortness of breath. No alleviating or exacerbating factors. She does not take any medications or measures alleviate her symptoms. Does admit to fatigue, nonproductive cough, mild headache, myalgias, and general malaise. Denies any chest pain, abdominal pain, flank pain, dysuria, hematuria, diarrhea, constipation, new rashes or sores. Except as noted above the remainder of the review of systems was reviewed and negative. Review of Systems   Constitutional: Positive for activity change, appetite change and fatigue. Negative for diaphoresis, fever and unexpected weight change. HENT: Negative for congestion, trouble swallowing and voice change. Eyes: Negative for visual disturbance. Respiratory: Positive for cough and shortness of breath. Negative for apnea and chest tightness. Cardiovascular: Negative for chest pain, palpitations and leg swelling. Gastrointestinal: Positive for diarrhea. Negative for abdominal distention, abdominal pain, constipation, nausea and vomiting. Endocrine: Negative for polyphagia and polyuria. Genitourinary: Negative for dysuria and hematuria. Musculoskeletal: Positive for myalgias. Skin: Negative for color change, pallor, rash and wound. Neurological: Positive for headaches. Negative for dizziness and light-headedness. Hematological: Negative for adenopathy. Does not bruise/bleed easily. Psychiatric/Behavioral: Negative for agitation. Physical Exam  Vitals signs and nursing note reviewed. Constitutional:       General: She is not in acute distress. Appearance: Normal appearance. She is not ill-appearing or diaphoretic. Comments: Conversationally dyspneic. HENT:      Head: Normocephalic and atraumatic. Nose: Nose normal.   Eyes:      Extraocular Movements: Extraocular movements intact. Pupils: Pupils are equal, round, and reactive to light. Neck:      Musculoskeletal: Normal range of motion. Cardiovascular:      Rate and Rhythm: Normal rate and regular rhythm. Pulses: Normal pulses. Heart sounds: Normal heart sounds. No murmur. No friction rub. No gallop. Pulmonary:      Effort: Tachypnea and respiratory distress present. No accessory muscle usage or prolonged expiration. Breath sounds: No stridor. No decreased breath sounds, wheezing, rhonchi or rales. Chest:      Chest wall: No tenderness. Abdominal:      General: Bowel sounds are normal. There is no distension. Palpations: Abdomen is soft. There is no mass. Tenderness: There is no abdominal tenderness. There is no right CVA tenderness, left CVA tenderness, guarding or rebound. Negative signs include Holman's sign, Rovsing's sign and McBurney's sign. Hernia: No hernia is present. Musculoskeletal: Normal range of motion. General: No deformity. Right lower leg: No edema. Left lower leg: No edema. Skin:     General: Skin is warm and dry. Capillary Refill: Capillary refill takes less than 2 seconds. Neurological:      General: No focal deficit present. Mental Status: She is alert and oriented to person, place, and time.    Psychiatric:         Mood and Affect: Mood normal.          Procedures     MDM  Number of Diagnoses or Management Options  Diagnosis management comments: 80-year-old female with a past medical history of hypertension presents with complaints of fatigue, myalgias, nonproductive cough, lightheadedness, and shortness of breath. Patient was called to patient's home found her to be 88% on room air. She was given 3 L nasal cannula and started saturating at 96%. On physical exam patient is in mild respiratory distress. She is conversationally dyspneic. Is not using accessory muscles to breathe. Lungs are clear to auscultation bilaterally. No wheeze rales rhonchi appreciated. Normal S1-S2. Abdomen soft nontender, no rebound or guarding. Negative CVA tenderness bilaterally. No bilateral leg edema. Diagnostic labs significant for hypokalemia with a potassium of 2.5. As well as a hypomagnesemia of 1.2. She is Covid positive. CT imaging of her chest shows bilateral patchy infiltrates most notably in the right upper lobes representing pneumonia. Is consistent with Covid pneumonia. As patient has been respiratory distress requiring oxygen patient was given Decadron. Patient admitted to intermediate telemetry for acute respiratory distress with hypoxia secondary to Covid pneumonia. Her potassium and magnesium were repleted. --------------------------------------------- PAST HISTORY ---------------------------------------------  Past Medical History:  has a past medical history of Hypertension. Past Surgical History:  has a past surgical history that includes Appendectomy and Abscess Drainage. Social History:  reports that she has never smoked. She has never used smokeless tobacco. She reports that she does not drink alcohol or use drugs. Family History: family history includes Asthma in her father. The patients home medications have been reviewed. Allergies: Patient has no known allergies.     -------------------------------------------------- RESULTS -------------------------------------------------    LABS:  Results for orders placed or performed during the hospital encounter of 12/19/20   CBC Auto Differential   Result Value Ref Range    WBC 5.8 4.5 - 11.5 E9/L    RBC 3.77 3.50 - 5.50 E12/L    Hemoglobin 11.4 (L) 11.5 - 15.5 g/dL    Hematocrit 34.6 34.0 - 48.0 %    MCV 91.8 80.0 - 99.9 fL    MCH 30.2 26.0 - 35.0 pg    MCHC 32.9 32.0 - 34.5 %    RDW 13.1 11.5 - 15.0 fL    Platelets 280 (L) 058 - 450 E9/L    MPV 9.3 7.0 - 12.0 fL    Neutrophils % 76.8 43.0 - 80.0 %    Immature Granulocytes % 0.5 0.0 - 5.0 %    Lymphocytes % 17.0 (L) 20.0 - 42.0 %    Monocytes % 5.5 2.0 - 12.0 %    Eosinophils % 0.0 0.0 - 6.0 %    Basophils % 0.2 0.0 - 2.0 %    Neutrophils Absolute 4.44 1.80 - 7.30 E9/L    Immature Granulocytes # 0.03 E9/L    Lymphocytes Absolute 0.98 (L) 1.50 - 4.00 E9/L    Monocytes Absolute 0.32 0.10 - 0.95 E9/L    Eosinophils Absolute 0.00 (L) 0.05 - 0.50 E9/L    Basophils Absolute 0.01 0.00 - 0.20 U7/A   Basic Metabolic Panel   Result Value Ref Range    Sodium 137 132 - 146 mmol/L    Potassium 2.5 (LL) 3.5 - 5.0 mmol/L    Chloride 102 98 - 107 mmol/L    CO2 23 22 - 29 mmol/L    Anion Gap 12 7 - 16 mmol/L    Glucose 94 74 - 99 mg/dL    BUN 11 8 - 23 mg/dL    CREATININE 0.7 0.5 - 1.0 mg/dL    GFR Non-African American >60 >=60 mL/min/1.73    GFR African American >60     Calcium 8.1 (L) 8.6 - 10.2 mg/dL   Hepatic Function Panel   Result Value Ref Range    Total Protein 6.0 (L) 6.4 - 8.3 g/dL    Alb 3.0 (L) 3.5 - 5.2 g/dL    Alkaline Phosphatase 40 35 - 104 U/L    ALT 10 0 - 32 U/L    AST 32 (H) 0 - 31 U/L    Total Bilirubin 0.4 0.0 - 1.2 mg/dL    Bilirubin, Direct <0.2 0.0 - 0.3 mg/dL    Bilirubin, Indirect see below 0.0 - 1.0 mg/dL   Troponin   Result Value Ref Range    Troponin <0.01 0.00 - 0.03 ng/mL   Brain Natriuretic Peptide   Result Value Ref Range    Pro- 0 - 450 pg/mL   D-Dimer, Quantitative   Result Value Ref Range    D-Dimer, Quant 416 ng/mL DDU   Protime-INR   Result Value Ref Range    Protime 13.7 (H) 9.3 - 12.4 sec    INR 1.2    APTT   Result Value Ref Range    aPTT 26.5 24.5 - 35.1 sec   Sedimentation Rate   Result Value Ref Range    Sed Rate 50 (H) 0 - 20 mm/Hr   Magnesium   Result Value Ref Range    Magnesium 1.2 (L) 1.6 - 2.6 mg/dL   COVID-19   Result Value Ref Range    SARS-CoV-2, NAAT DETECTED (A) Not Detected       RADIOLOGY:  CT CHEST WO CONTRAST   Final Result   Patchy bilateral alveolar and interstitial infiltrates most notably in the   right upper, middle and left lower lobes probably representing pneumonia. Other infectious/inflammatory processes such as COVID-19 are also considered. Coronary artery/atherosclerotic disease   FINDINGS:   Mediastinum:   Lungs/pleura:   Upper Abdomen:   Soft Tissues/Bones:      XR CHEST PORTABLE   Final Result   Vague ill-defined infiltrate in the right parahilar region. The focal   pneumonia considered. EKG: This EKG is signed and interpreted by me. Heart rate 89. Normal sinus rhythm. Left axis deviation. No acute evaluation. No ST elevations or depressions. Stable as compared to previous EKG on 7/30/2019.      ------------------------- NURSING NOTES AND VITALS REVIEWED ---------------------------  Date / Time Roomed:  12/19/2020 11:18 AM  ED Bed Assignment:  26/26    The nursing notes within the ED encounter and vital signs as below have been reviewed.      Patient Vitals for the past 24 hrs:   BP Temp Temp src Pulse Resp SpO2 Height Weight   12/19/20 1415 131/65 98.9 °F (37.2 °C) Oral 80 18 96 % -- --   12/19/20 1400 131/65 -- -- 61 -- 93 % -- --   12/19/20 1345 (!) 145/71 -- -- 92 -- 97 % -- --   12/19/20 1245 137/66 -- -- 85 -- 98 % -- --   12/19/20 1215 (!) 147/73 -- -- 94 -- 96 % -- --   12/19/20 1157 -- -- -- -- -- 98 % -- --   12/19/20 1145 -- -- -- 92 -- 98 % -- --   12/19/20 1130 (!) 151/84 -- -- 99 -- 92 % -- --   12/19/20 1126 -- 99.9 °F (37.7 °C) -- 95 18 (!) 89 % 4' 11\"

## 2020-12-21 NOTE — CARE COORDINATION
Social Work/Discharge Planning:  Called patient and completed initial assessment. Explained Social Work role and discussed transition of care/discharge planning. COVID positive 12/19. Patient son lives with her in a one story house. Patient son is currently hospitalized. PTA patient uses a cane. Patient PCP is Dr. Kalen Rodriguez and pharmacy is [address] on [address]. She denies any history with home health care or a skilled nursing facility. Patient is currently requiring two liters of oxygen but does not have home oxygen. She does not have a DME preference IF oxygen is needed at discharge. She plans to return home at discharge since she is the caregiver for her son. Will continue to follow and assist with discharge planning.   Electronically signed by KEN Lazaro on 12/21/2020 at 3:18 PM

## 2020-12-22 NOTE — PROGRESS NOTES
[facility]   Progress Note    Admitting Date and Time: 12/19/2020 11:18 AM  Admit Dx: Acute on chronic respiratory failure with hypoxia (HCC) [J96.21]    Subjective:    Patient was admitted with Acute on chronic respiratory failure with hypoxia ([address] [J96.21]. Patient denies fever, chills, cp, n/v. Pt with some sob.       amitriptyline  25 mg Oral Daily    amLODIPine  5 mg Oral Daily    metoprolol tartrate  25 mg Oral BID    perphenazine  4 mg Oral Daily    sodium chloride flush  10 mL Intravenous 2 times per day    enoxaparin  40 mg Subcutaneous Daily    dexamethasone  6 mg Oral Daily    Vitamin D  2,000 Units Oral Daily    ascorbic acid  500 mg Oral Daily    zinc sulfate  50 mg Oral Daily    pantoprazole  40 mg Oral QAM AC         sodium chloride flush, 10 mL, PRN      promethazine, 12.5 mg, Q6H PRN    Or      ondansetron, 4 mg, Q6H PRN      polyethylene glycol, 17 g, Daily PRN      acetaminophen, 650 mg, Q6H PRN    Or      acetaminophen, 650 mg, Q6H PRN      guaiFENesin-dextromethorphan, 5 mL, Q4H PRN         Objective:    BP (!) 108/58   Pulse 66   Temp 97.7 °F (36.5 °C) (Oral)   Resp 18   Ht 4' 11\" (1.499 m)   Wt 125 lb (56.7 kg)   SpO2 91%   BMI 25.25 kg/m²   Skin: warm and dry, no rash or erythema  Pulmonary/Chest: clear to auscultation bilaterally- no wheezes, rales or rhonchi, normal air movement, no respiratory distress  Cardiovascular: rhythm reg at rate of 68  Abdomen: soft, non-tender, non-distended, normal bowel sounds, no masses or organomegaly  Extremities: no cyanosis, no clubbing and no edema      Recent Labs     12/19/20  1148 12/20/20  0245 12/21/20  0459    132 133   K 2.5* 3.7  3.6 3.7    99 99   CO2 23 22 24   BUN 11 12 24*   CREATININE 0.7 0.6 0.8   GLUCOSE 94 155* 120*   CALCIUM 8.1* 9.2 9.2       Recent Labs     12/19/20  1148   WBC 5.8   RBC 3.77   HGB 11.4*   HCT 34.6   MCV 91.8   MCH 30.2   MCHC 32.9   RDW 13.1   * MPV 9.3       BMP:    Lab Results   Component Value Date     12/21/2020    K 3.7 12/21/2020    K 3.6 12/20/2020    CL 99 12/21/2020    CO2 24 12/21/2020    BUN 24 12/21/2020    LABALBU 3.0 12/19/2020    CREATININE 0.8 12/21/2020    CALCIUM 9.2 12/21/2020    GFRAA >60 12/21/2020    LABGLOM >60 12/21/2020    GLUCOSE 120 12/21/2020        Radiology:   CT CHEST WO CONTRAST   Final Result   Patchy bilateral alveolar and interstitial infiltrates most notably in the   right upper, middle and left lower lobes probably representing pneumonia. Other infectious/inflammatory processes such as COVID-19 are also considered. Coronary artery/atherosclerotic disease   FINDINGS:   Mediastinum:   Lungs/pleura:   Upper Abdomen:   Soft Tissues/Bones:      XR CHEST PORTABLE   Final Result   Vague ill-defined infiltrate in the right parahilar region. The focal   pneumonia considered. Assessment:    Active Problems:    Acute respiratory failure with hypoxia (HCC)    COVID-19    Pneumonia due to COVID-19 virus    Thrombocytopenia (HCC)  Resolved Problems:    * No resolved hospital problems. *      Plan:  1. Acute respiratory failure with hypoxia (o2sat 89%)POA adjust o2 as needed  2. Pneumonia due to covid 19  continue steroids  3. Hypokalemia monitor and replace prn  4. Hypomagnesemia monitor and replace prn  5.  Thrombocytopenia monitor  6. htn continue med        Electronically signed by Lana Cornelius DO on 12/21/2020 at 9:23 PM

## 2020-12-22 NOTE — PROGRESS NOTES
Pt walked into the bathroom with walker. Patient c/o dizziness and not being able to catch her breath. Returned to bed /73,  SpO2 75% on 4LNC. Patient increased to [facility] with no improvement in saturation. Placed on 6L HFNC, SpO2 79%, Increased to 8L HFNC, SpO2 81%, increased to 10L HFNC, SpO2 85%. 12L HFNC 85%, increased to 15L HFNC. SpO2 87-88%. Dr Eladio Oppenheim called and aware. Patient placed on continuous pulse ox.

## 2020-12-22 NOTE — CARE COORDINATION
CM NOTE: Per QFR---covid positive in droplet plus isolation. Continues using O2 2L which she does not have at home. Pulmonology consult for intermittent hypoxia despite using O2. Await eval & input. CM & SW continue to follow pt.

## 2020-12-23 NOTE — CARE COORDINATION
Social work / Discharge Planning:       [address]. Lives with son who is also hospitalized and is her caregiver. Had no preference for home DME if oxygen is needed for discharge. Tentative referral made to [facility] [place] [facility] [place]. Patient is currently on 15 liters high flow. She has a new pulmonology consult and she started on Remdesivir last evening. Social work will follow.   Electronically signed by KEN Ramirez on 12/23/2020 at 8:53 AM

## 2020-12-23 NOTE — CONSULTS
PULMONARY CONSULTATION    Reason for Consultation: hypoxia   Referring Physician: María Martel DO    Communication with the referring physician will be sent via the electronic medical record. HPI:    The patient is a 80year old female with a history of hypertension who was admitted to the hospital 12/19/2020 with generalized weakness, fatigue, cough, shortness of breath. She was subsequently found to be positive for COVID-19 and believes she received it from her live in son. She has had rapidly escalating oxygen requirements over the last 24-48 hours for which I am asked to see her. Currently Ms. Schmidt states that she is feeling okay. She has mild dyspnea and a cough with some sputum production. She is not having any hemoptysis. She had a temp of 100.1. She has been started on decadron 6mg BID and remdesivir. Today she was dosed with Tocilizumab. Past Medical History:   Diagnosis Date    Hypertension        Past Surgical History:   Procedure Laterality Date    ABSCESS DRAINAGE      BUTTOCKS    APPENDECTOMY         Family History   Problem Relation Age of Onset    Asthma Father        Social History     Socioeconomic History    Marital status:      Spouse name: Not on file    Number of children: Not on file    Years of education: Not on file    Highest education level: Not on file   Occupational History    Not on file   Social Needs    Financial resource strain: Not on file    Food insecurity     Worry: Not on file     Inability: Not on file    Transportation needs     Medical: Not on file     Non-medical: Not on file   Tobacco Use    Smoking status: Never Smoker    Smokeless tobacco: Never Used   Substance and Sexual Activity    Alcohol use: Never     Comment: no coffee .     Drug use: Never    Sexual activity: Never   Lifestyle    Physical activity     Days per week: Not on file     Minutes per session: Not on file    Stress: Not on file   Relationships    Social connections     Talks on phone: Not on file     Gets together: Not on file     Attends [employer] service: Not on file     Active member of club or organization: Not on file     Attends meetings of clubs or organizations: Not on file     Relationship status: Not on file    Intimate partner violence     Fear of current or ex partner: Not on file     Emotionally abused: Not on file     Physically abused: Not on file     Forced sexual activity: Not on file   Other Topics Concern    Not on file   Social History Narrative    Not on file       Current Facility-Administered Medications   Medication Dose Route Frequency Provider Last Rate Last Edwardn Terry ON 12/24/2020] remdesivir 100 mg in sodium chloride 0.9 % 250 mL IVPB  100 mg Intravenous Q24H Jean Claude Hric, DO        0.9 % sodium chloride bolus  30 mL Intravenous PRN Jean Claude Hric, DO        dexamethasone (DECADRON) tablet 6 mg  6 mg Oral 2 times per day Maria M Dixon MD        amitriptyline (ELAVIL) tablet 25 mg  25 mg Oral Daily Sawyer Correa MD   25 mg at 12/23/20 0827    amLODIPine (NORVASC) tablet 5 mg  5 mg Oral Daily Sawyer Correa MD   5 mg at 12/23/20 0827    metoprolol tartrate (LOPRESSOR) tablet 25 mg  25 mg Oral BID Sawyer Correa MD   25 mg at 12/23/20 0827    perphenazine tablet 4 mg  4 mg Oral Daily Sawyer Correa MD   4 mg at 12/23/20 0827    sodium chloride flush 0.9 % injection 10 mL  10 mL Intravenous 2 times per day Sawyer Correa MD   10 mL at 12/23/20 0828    sodium chloride flush 0.9 % injection 10 mL  10 mL Intravenous PRN Sawyer Correa MD        enoxaparin (LOVENOX) injection 40 mg  40 mg Subcutaneous Daily Sawyer Correa MD   40 mg at 12/22/20 1756    promethazine (PHENERGAN) tablet 12.5 mg  12.5 mg Oral Q6H PRN Sawyer Correa MD        Or    ondansetron (ZOFRAN) injection 4 mg  4 mg Intravenous Q6H PRN Sawyer Correa MD   4 mg at 12/22/20 1756    polyethylene glycol (GLYCOLAX) packet 17 g  17 g Oral Daily PRN Sawyer Correa MD        acetaminophen (TYLENOL) tablet 650 mg  650 mg Oral Q6H PRN Adrian Whitfield MD   650 mg at 12/23/20 0827    Or    acetaminophen (TYLENOL) suppository 650 mg  650 mg Rectal Q6H PRN Adrian Whitfield MD        guaiFENesin-dextromethorphan (ROBITUSSIN DM) 100-10 MG/5ML syrup 5 mL  5 mL Oral Q4H PRN Ardian Whitfield MD        Vitamin D (CHOLECALCIFEROL) tablet 2,000 Units  2,000 Units Oral Daily Adrian Whitfield MD   2,000 Units at 12/23/20 0827    ascorbic acid (VITAMIN C) tablet 500 mg  500 mg Oral Daily Adrian Whitfield MD   500 mg at 12/23/20 0827    zinc sulfate (ZINCATE) capsule 50 mg  50 mg Oral Daily Adrian Whitfield MD   50 mg at 12/23/20 0827    pantoprazole (PROTONIX) tablet 40 mg  40 mg Oral QAM AC Adrian Whitfield MD   40 mg at 12/23/20 0827       No Known Allergies    Review of Systems:  14 point ROS obtained and neg aside from outlined in the HPI    Physical Exam:  BP (!) 148/66   Pulse 75   Temp 97.5 °F (36.4 °C) (Oral)   Resp 20   Ht 4' 11\" (1.499 m)   Wt 125 lb (56.7 kg)   SpO2 90%   BMI 25.25 kg/m²    General: Lying in bed comfortably, no distress, breathing is not labored  HEENT: PERRL, EOMI, MMM, no oral lesions  Neck: supple, no adenopathy  CV: RRR without murmur  Lungs: few scattered crackles   Abd: soft, NT, ND, bowel sounds normal  Ext: warm, no edema, no clubbing  Skin: no rashes  Neuro: CN II-XII grossly intact, no focal deficits    Oximetry: 90% on 15L NC    Imaging personally reviewed:  CXR     Worsening airspace disease which is generalized and is likely a manifestation   of worsening pneumonia. CT chest     Impression   Patchy bilateral alveolar and interstitial infiltrates most notably in the   right upper, middle and left lower lobes probably representing pneumonia. Other infectious/inflammatory processes such as COVID-19 are also considered.    Coronary artery/atherosclerotic disease   FINDINGS:   Mediastinum:   Lungs/pleura:   Upper Abdomen:   Soft Tissues/Bones:       CRP 14 ----> 10.8  D dimer 700    Labs:  Lab Results   Component Value Date    WBC 10.9 12/23/2020    HGB 12.1 12/23/2020    HCT 37.9 12/23/2020    MCV 93.3 12/23/2020    MCH 29.8 12/23/2020    MCHC 31.9 12/23/2020    RDW 13.4 12/23/2020     12/23/2020    MPV 9.2 12/23/2020     Lab Results   Component Value Date     12/23/2020    K 3.6 12/23/2020     12/23/2020    CO2 27 12/23/2020    BUN 19 12/23/2020    CREATININE 0.8 12/23/2020    LABALBU 2.9 12/23/2020    CALCIUM 8.7 12/23/2020    GFRAA >60 12/23/2020    LABGLOM >60 12/23/2020     Lab Results   Component Value Date    PROTIME 13.7 12/19/2020    INR 1.2 12/19/2020           Assessment:  1. Acute hypoxic respiratory failure  2. Severe COVID-19 pneumonia  3. Elevated inflammatory markers  4. Lymphopenia, improved     Plan:  1. Wean oxygen as tolerated  2. Continue Decadron but increase to BID  3. Continue Remdesivir to complete 5 days  4. Tocilizumab given 12/23/2020  5. DVT prophylaxis   6. Trend labs  7. Incentive/out of bed    Thank you for allowing me to participate in the care of [facility].        Olga Stafford MD  12/23/2020 2:39 PM

## 2020-12-23 NOTE — PROGRESS NOTES
[facility] Hospitalist   Progress Note    Admitting Date and Time: 12/19/2020 11:18 AM  Admit Dx: Acute on chronic respiratory failure with hypoxia (HCC) [J96.21]    Subjective:    Patient was admitted with Acute on chronic respiratory failure with hypoxia ([address] [J96.21]. Patient denies fever, chills, cp, n/v. Pt with some sob.      amitriptyline  25 mg Oral Daily    amLODIPine  5 mg Oral Daily    metoprolol tartrate  25 mg Oral BID    perphenazine  4 mg Oral Daily    sodium chloride flush  10 mL Intravenous 2 times per day    enoxaparin  40 mg Subcutaneous Daily    dexamethasone  6 mg Oral Daily    Vitamin D  2,000 Units Oral Daily    ascorbic acid  500 mg Oral Daily    zinc sulfate  50 mg Oral Daily    pantoprazole  40 mg Oral QAM AC         sodium chloride flush, 10 mL, PRN      promethazine, 12.5 mg, Q6H PRN    Or      ondansetron, 4 mg, Q6H PRN      polyethylene glycol, 17 g, Daily PRN      acetaminophen, 650 mg, Q6H PRN    Or      acetaminophen, 650 mg, Q6H PRN      guaiFENesin-dextromethorphan, 5 mL, Q4H PRN         Objective:    /73   Pulse 100   Temp 100 °F (37.8 °C) (Oral)   Resp 10   Ht 4' 11\" (1.499 m)   Wt 125 lb (56.7 kg)   SpO2 91%   BMI 25.25 kg/m²   Skin: warm and dry, no rash or erythema  Pulmonary/Chest: clear to auscultation bilaterally- no wheezes, rales or rhonchi, normal air movement, no respiratory distress  Cardiovascular: rhythm reg at rate of 98  Abdomen: soft, non-tender, non-distended, normal bowel sounds, no masses or organomegaly  Extremities: no cyanosis, no clubbing and no edema      Recent Labs     12/20/20  0245 12/21/20  0459 12/22/20  0333    133 134   K 3.7  3.6 3.7 3.6   CL 99 99 99   CO2 22 24 25   BUN 12 24* 24*   CREATININE 0.6 0.8 0.8   GLUCOSE 155* 120* 126*   CALCIUM 9.2 9.2 9.3       Recent Labs     12/22/20  0333   WBC 10.7   RBC 4.18   HGB 12.6   HCT 38.1   MCV 91.1   MCH 30.1   MCHC 33.1   RDW 13.2      MPV 9. 7       CBC with Differential:    Lab Results   Component Value Date    WBC 10.7 12/22/2020    RBC 4.18 12/22/2020    HGB 12.6 12/22/2020    HCT 38.1 12/22/2020     12/22/2020    MCV 91.1 12/22/2020    MCH 30.1 12/22/2020    MCHC 33.1 12/22/2020    RDW 13.2 12/22/2020    NRBC 0.9 07/17/2019    METASPCT 0.9 07/17/2019    LYMPHOPCT 14.9 12/22/2020    MONOPCT 5.2 12/22/2020    MYELOPCT 0.9 07/17/2019    BASOPCT 0.1 12/22/2020    MONOSABS 0.56 12/22/2020    LYMPHSABS 1.59 12/22/2020    EOSABS 0.00 12/22/2020    BASOSABS 0.01 12/22/2020     BMP:    Lab Results   Component Value Date     12/22/2020    K 3.6 12/22/2020    K 3.6 12/20/2020    CL 99 12/22/2020    CO2 25 12/22/2020    BUN 24 12/22/2020    LABALBU 3.2 12/22/2020    CREATININE 0.8 12/22/2020    CALCIUM 9.3 12/22/2020    GFRAA >60 12/22/2020    LABGLOM >60 12/22/2020    GLUCOSE 126 12/22/2020     Hepatic Function Panel:    Lab Results   Component Value Date    ALKPHOS 45 12/22/2020    ALT 14 12/22/2020    AST 41 12/22/2020    PROT 6.8 12/22/2020    BILITOT 0.3 12/22/2020    BILIDIR <0.2 12/22/2020    IBILI see below 12/22/2020    LABALBU 3.2 12/22/2020     Magnesium:    Lab Results   Component Value Date    MG 2.2 12/22/2020     Phosphorus:    Lab Results   Component Value Date    PHOS 2.6 12/22/2020        Radiology:   CT CHEST WO CONTRAST   Final Result   Patchy bilateral alveolar and interstitial infiltrates most notably in the   right upper, middle and left lower lobes probably representing pneumonia. Other infectious/inflammatory processes such as COVID-19 are also considered. Coronary artery/atherosclerotic disease   FINDINGS:   Mediastinum:   Lungs/pleura:   Upper Abdomen:   Soft Tissues/Bones:      XR CHEST PORTABLE   Final Result   Vague ill-defined infiltrate in the right parahilar region. The focal   pneumonia considered.           Assessment:    Active Problems:    Acute respiratory failure with hypoxia ([address]    COVID-19 Pneumonia due to COVID-19 virus    Thrombocytopenia ([facility] [facility]  Resolved Problems:    * No resolved hospital problems. *      Plan:  1. Acute respiratory failure with hypoxia (o2sat 89%)POA adjust o2 as needed  2. Pneumonia due to covid 19  continue steroids  3. Hypokalemia monitor and replace prn  4. Hypomagnesemia monitor and replace prn  5. Thrombocytopenia monitor  6. htn continue med    Will ask pulmonary to evaluate pt. Given rapid increase in o2 usage despite reported hx of symptoms starting 2 weeks ago, will initiate remdesivir.          Electronically signed by Filomena Rubio DO on 12/22/2020 at 11:36 PM

## 2020-12-24 NOTE — PROGRESS NOTES
[facility] Hospitalist   Progress Note    Admitting Date and Time: 12/19/2020 11:18 AM  Admit Dx: Acute on chronic respiratory failure with hypoxia (HCC) [J96.21]    Subjective:    Patient was admitted with Acute on chronic respiratory failure with hypoxia ([address] [J96.21]. Patient denies fever, chills, cp, n/v. Pt c/o sob.      remdesivir IVPB  100 mg Intravenous Q24H    dexamethasone  6 mg Oral 2 times per day    amitriptyline  25 mg Oral Daily    amLODIPine  5 mg Oral Daily    metoprolol tartrate  25 mg Oral BID    perphenazine  4 mg Oral Daily    sodium chloride flush  10 mL Intravenous 2 times per day    enoxaparin  40 mg Subcutaneous Daily    Vitamin D  2,000 Units Oral Daily    ascorbic acid  500 mg Oral Daily    zinc sulfate  50 mg Oral Daily    pantoprazole  40 mg Oral QAM AC         sodium chloride, 30 mL, PRN      sodium chloride flush, 10 mL, PRN      promethazine, 12.5 mg, Q6H PRN    Or      ondansetron, 4 mg, Q6H PRN      polyethylene glycol, 17 g, Daily PRN      acetaminophen, 650 mg, Q6H PRN    Or      acetaminophen, 650 mg, Q6H PRN      guaiFENesin-dextromethorphan, 5 mL, Q4H PRN         Objective:    BP (!) 159/79   Pulse 71   Temp 98.3 °F (36.8 °C) (Oral)   Resp 22   Ht 4' 11\" (1.499 m)   Wt 125 lb (56.7 kg)   SpO2 92%   BMI 25.25 kg/m²   Skin: warm and dry, no rash or erythema  Pulmonary/Chest: clear to auscultation bilaterally- no wheezes, rales or rhonchi, normal air movement,  Pt with some dyspnea  Cardiovascular: rhythm reg at rate of 72  Abdomen: soft, non-tender, non-distended, normal bowel sounds, no masses or organomegaly  Extremities: no cyanosis, no clubbing and no edema      Recent Labs     12/22/20  0333 12/23/20  0315 12/24/20  0550    135 136   K 3.6 3.6 3.9   CL 99 101 103   CO2 25 27 25   BUN 24* 19 19   CREATININE 0.8 0.8 0.6   GLUCOSE 126* 109* 161*   CALCIUM 9.3 8.7 8.8       Recent Labs     12/22/20  0333 12/23/20  0315 12/24/20  0550   WBC 10.7 10.9 7.6   RBC 4.18 4.06 4.46   HGB 12.6 12.1 13.2   HCT 38.1 37.9 40.7   MCV 91.1 93.3 91.3   MCH 30.1 29.8 29.6   MCHC 33.1 31.9* 32.4   RDW 13.2 13.4 13.4    183 236   MPV 9.7 9.2 9.3       CBC with Differential:    Lab Results   Component Value Date    WBC 7.6 12/24/2020    RBC 4.46 12/24/2020    HGB 13.2 12/24/2020    HCT 40.7 12/24/2020     12/24/2020    MCV 91.3 12/24/2020    MCH 29.6 12/24/2020    MCHC 32.4 12/24/2020    RDW 13.4 12/24/2020    NRBC 0.0 12/24/2020    METASPCT 0.9 07/17/2019    LYMPHOPCT 14.8 12/24/2020    MONOPCT 0.0 12/24/2020    MYELOPCT 0.9 12/24/2020    BASOPCT 0.0 12/24/2020    MONOSABS 0.00 12/24/2020    LYMPHSABS 1.14 12/24/2020    EOSABS 0.00 12/24/2020    BASOSABS 0.00 12/24/2020     CMP:    Lab Results   Component Value Date     12/24/2020    K 3.9 12/24/2020     12/24/2020    CO2 25 12/24/2020    BUN 19 12/24/2020    CREATININE 0.6 12/24/2020    GFRAA >60 12/24/2020    LABGLOM >60 12/24/2020    GLUCOSE 161 12/24/2020    PROT 6.7 12/24/2020    LABALBU 2.9 12/24/2020    CALCIUM 8.8 12/24/2020    BILITOT 0.4 12/24/2020    ALKPHOS 53 12/24/2020    AST 46 12/24/2020    ALT 16 12/24/2020        Radiology:   XR CHEST PORTABLE   Final Result   1. No interval change in the multifocal bilateral pulmonary infiltrates. XR CHEST PORTABLE   Final Result   Worsening airspace disease which is generalized and is likely a manifestation   of worsening pneumonia. CT CHEST WO CONTRAST   Final Result   Patchy bilateral alveolar and interstitial infiltrates most notably in the   right upper, middle and left lower lobes probably representing pneumonia. Other infectious/inflammatory processes such as COVID-19 are also considered.    Coronary artery/atherosclerotic disease   FINDINGS:   Mediastinum:   Lungs/pleura:   Upper Abdomen:   Soft Tissues/Bones:      XR CHEST PORTABLE   Final Result   Vague ill-defined infiltrate in the right parahilar region. The focal   pneumonia considered. Assessment:    Active Problems:    Acute respiratory failure with hypoxia (HCC)    COVID-19    Pneumonia due to COVID-19 virus    Thrombocytopenia (HCC)  Resolved Problems:    * No resolved hospital problems. *      Plan:  1. Acute respiratory failure with hypoxia (o2sat 89%)POA adjust o2 as needed. Pulmonary consulted  2. Pneumonia due to covid 19  continue steroids and remdesivir. toci per pulm  3. Hypokalemia monitor and replace prn  4. Hypomagnesemia monitor and replace prn  5. Hyperglycemia likely due to stress/steroids monitor  6. Thrombocytopenia monitor improving  7. htn continue med    Discussed and confirmed code status. Pt dnr-cca with no intubation  Pt given NIV and lasix and updated by nursing. Pt doing better.     Electronically signed by Cathleen Hearn DO on 12/24/2020 at 5:32 PM

## 2020-12-24 NOTE — PROGRESS NOTES
Patient SPO2 decreasing this morning in the 70s and patient tachycardic  Gisselledonaldo Powell on floor along with Dr. Jenny Madison and orders placed for heated-high flow, bipap, and EKG. Pt on Heated-high flow and SPO2 90-91%. I will continue to alternate bipap and heated High-flow. I will continue to monitor.  Pt's daughter Rosy Luna updated on health status

## 2020-12-24 NOTE — PROGRESS NOTES
Pt alert and oriented x4 and verbalizes that if her heart stops and breathing ceases, she does not want CPR, intubation or any medication to keep her alive. She wants to be comfortable. If intubation becomes indicated she would only want to be comfortable, She wants no intubation. Dr. Miranda Mathew updated.

## 2020-12-24 NOTE — PROGRESS NOTES
Oriana Morgan M.D.,[facility]  Nick Sy D.O., F.JOSE RAFAEL.ORobeI., Eric Mccauley M.D. Millie Baer M.D., Ephraim Haile M.D. Destinee Kenny D.O. Daily Pulmonary Progress Note    Patient:  Darryl Shcmidt 80 y.o. female MRN: [id number]     Date of Service: 12/24/2020      Synopsis     We are following patient for respiratory failure with hypoxia, severe COVID-19 pneumonitis    \"CC\" shortness of breath    Code status: Full      Subjective      Patient was seen and examined. Complaining of shortness of breath at rest.  Oxygen 79%  's with removal of nonrebreather mask currently on 15 L high flow nasal cannula. Attempting to eat breakfast.  Complaining of weakness and fatigue. No cough or mucus production. No chest pain. No fevers. Review of Systems:  Constitutional: Denies fever, weight loss, night sweats, positive for fatigue  Skin: Denies pigmentation, dark lesions, and rashes   HEENT: Denies hearing loss, tinnitus, ear drainage, epistaxis, sore throat, and hoarseness. Cardiovascular: Denies palpitations, chest pain, and chest pressure.   Respiratory: Positive shortness of breath  Gastrointestinal: Denies nausea, vomiting, poor appetite, diarrhea, heartburn or reflux  Genitourinary: Denies dysuria, frequency, urgency or hematuria  Musculoskeletal: Denies myalgias, muscle weakness, and bone pain  Neurological: Denies dizziness, vertigo, headache, and focal weakness  Psychological: Denies anxiety and depression  Endocrine: Denies heat intolerance and cold intolerance  Hematopoietic/Lymphatic: Denies bleeding problems and blood transfusions    24-hour events:  none    Objective   Vitals: BP (!) 110/45   Pulse 72   Temp 97.7 °F (36.5 °C) (Oral)   Resp 20   Ht 4' 11\" (1.499 m)   Wt 125 lb (56.7 kg)   SpO2 93%   BMI 25.25 kg/m²     I/O:  No intake or output data in the 24 hours ending 12/24/20 0825    Vent Information  SpO2: 93 %      CURRENT MEDS :  Scheduled Meds:   remdesivir IVPB  100 mg Intravenous Q24H    dexamethasone  6 mg Oral 2 times per day    amitriptyline  25 mg Oral Daily    amLODIPine  5 mg Oral Daily    metoprolol tartrate  25 mg Oral BID    perphenazine  4 mg Oral Daily    sodium chloride flush  10 mL Intravenous 2 times per day    enoxaparin  40 mg Subcutaneous Daily    Vitamin D  2,000 Units Oral Daily    ascorbic acid  500 mg Oral Daily    zinc sulfate  50 mg Oral Daily    pantoprazole  40 mg Oral QAM AC       Physical Exam:  General Appearance: appears comfortable in no acute distress. HEENT: Normocephalic atraumatic without obvious abnormality   Neck: Lips, mucosa, and tongue normal.  Supple, symmetrical, trachea midline, no adenopathy;thyroid:  no enlargement/tenderness/nodules or JVD. Lung: Breath sounds inspiratory crackles bilaterally right greater than left respirations slightly labored mild tachypnea symmetrical expansion. Heart: RRR, normal S1, S2. No MRG  Abdomen: Soft, NT, ND. BS present x 4 quadrants. No bruit or organomegaly. Extremities: Pedal pulses 2+ symmetric b/l. Extremities normal, no cyanosis, clubbing, trace bilateral lower extremity edema  Musculokeletal: No joint swelling, no muscle tenderness. ROM normal in all joints of extremities. Neurologic: Mental status: Alert and Oriented X3 . Pertinent/ New Labs and Imaging Studies     Imaging Personally Reviewed:  Chest x-ray 12/23/2020  ORDERING SYSTEM PROVIDED HISTORY: covid, worsening hypoxia   TECHNOLOGIST PROVIDED HISTORY:   Reason for exam:->covid, worsening hypoxia   FINDINGS:   Bilateral airspace disease is generalized and may relate to pneumonia. Stable cardiomediastinal silhouette.  Neither costophrenic angle is clearly   blunted.       Impression   Worsening airspace disease which is generalized and is likely a manifestation   of worsening pneumonia.        CT chest without contrast 12/19/2020  Patchy bilateral alveolar and interstitial infiltrates most notably in the   right upper, middle and left lower lobes probably representing pneumonia. Other infectious/inflammatory processes such as COVID-19 are also considered. Coronary artery/atherosclerotic disease   FINDINGS:   Mediastinum:   Lungs/pleura:   Upper Abdomen:   Soft Tissues/Bones:         ECHO  2019        Summary   Left ventricular internal dimensions were normal in diastole and systole. No regional wall motion abnormalities seen. Normal left ventricular ejection fraction. There is doppler evidence of stage I diastolic dysfunction. Focal calcification mitral valve leaflets. Mild mitral annular calcification. The aortic valve appears mildly sclerotic. Labs:  Lab Results   Component Value Date    WBC 7.6 12/24/2020    HGB 13.2 12/24/2020    HCT 40.7 12/24/2020    MCV 91.3 12/24/2020    MCH 29.6 12/24/2020    MCHC 32.4 12/24/2020    RDW 13.4 12/24/2020     12/24/2020    MPV 9.3 12/24/2020     Lab Results   Component Value Date     12/24/2020    K 3.9 12/24/2020     12/24/2020    CO2 25 12/24/2020    BUN 19 12/24/2020    CREATININE 0.6 12/24/2020    LABALBU 2.9 12/24/2020    CALCIUM 8.8 12/24/2020    GFRAA >60 12/24/2020    LABGLOM >60 12/24/2020     Lab Results   Component Value Date    PROTIME 13.7 12/19/2020    INR 1.2 12/19/2020     No results for input(s): PROBNP in the last 72 hours. Recent Labs     12/23/20  0315   PROCAL 0.11*     This SmartLink has not been configured with any valid records. 12/23/2020  Ferritin 837    CRP 10.8    Micro:  No results for input(s): CULTRESP in the last 72 hours. No results for input(s): LABGRAM in the last 72 hours. No results for input(s): LEGUR in the last 72 hours. No results for input(s): STREPNEUMAGU in the last 72 hours. No results for input(s): LP1UAG in the last 72 hours. Strep pneumo antigen, Legionella urine antigen pending  Procalcitonin 0.11   Assessment:    1.  Acute respiratory failure with hypoxia  2. Severe COVID-19 pneumonitis  3. Elevated inflammatory markers  4. Lymphopenia  5. Sinus tachycardia    Plan:   1. Oxygen therapy and chest to heated high flow nasal cannula to maintain saturation greater than 90%  2. Add noninvasive ventilation in the form of AVAPS as needed for respiratory support target volume 400-450  3. COVID-19 treatment-continue remdesivir to complete 5 total days. Tocilizumab given x1 dose 12/23/2020. Decadron 6 mg p.o. twice daily, continue vitamin protocol  4. Trend inflammatory markers. Procalcitonin 0.11  5. Repeat chest x-ray today. Twelve-lead EKG per primary team.  proBNP 334  6. GI prophylaxis  7. Incentive spirometer  8. Lovenox 40 mg subcu daily for DVT prophylaxis.   D-dimer 745  Cussed case with primary team.  Electronically signed by EMETERIO Virk CNP on 12/24/2020 at 8:25 AM

## 2020-12-24 NOTE — PROGRESS NOTES
[facility]   [facility]    Admitting Date and Time: 12/19/2020 11:18 AM  Admit Dx: Acute on chronic respiratory failure with hypoxia (HCC) [J96.21]    Subjective:    Patient was admitted with Acute on chronic respiratory failure with hypoxia ([address] [J96.21]. Patient denies fever, chills, cp, n/v. Pt c/o sob.      [START ON 12/24/2020] remdesivir IVPB  100 mg Intravenous Q24H    dexamethasone  6 mg Oral 2 times per day    amitriptyline  25 mg Oral Daily    amLODIPine  5 mg Oral Daily    metoprolol tartrate  25 mg Oral BID    perphenazine  4 mg Oral Daily    sodium chloride flush  10 mL Intravenous 2 times per day    enoxaparin  40 mg Subcutaneous Daily    Vitamin D  2,000 Units Oral Daily    ascorbic acid  500 mg Oral Daily    zinc sulfate  50 mg Oral Daily    pantoprazole  40 mg Oral QAM AC         sodium chloride, 30 mL, PRN      sodium chloride flush, 10 mL, PRN      promethazine, 12.5 mg, Q6H PRN    Or      ondansetron, 4 mg, Q6H PRN      polyethylene glycol, 17 g, Daily PRN      acetaminophen, 650 mg, Q6H PRN    Or      acetaminophen, 650 mg, Q6H PRN      guaiFENesin-dextromethorphan, 5 mL, Q4H PRN         Objective:    BP (!) 111/53   Pulse 68   Temp 97.9 °F (36.6 °C) (Oral)   Resp 22   Ht 4' 11\" (1.499 m)   Wt 125 lb (56.7 kg)   SpO2 94%   BMI 25.25 kg/m²   Skin: warm and dry, no rash or erythema  Pulmonary/Chest: clear to auscultation bilaterally- no wheezes, rales or rhonchi, normal air movement, no respiratory distress  Cardiovascular: rhythm reg at rate of 70  Abdomen: soft, non-tender, non-distended, normal bowel sounds, no masses or organomegaly  Extremities: no cyanosis, no clubbing and no edema      Recent Labs     12/21/20  0459 12/22/20  0333 12/23/20  0315    134 135   K 3.7 3.6 3.6   CL 99 99 101   CO2 24 25 27   BUN 24* 24* 19   CREATININE 0.8 0.8 0.8   GLUCOSE 120* 126* 109*   CALCIUM 9.2 9.3 8.7       Recent Labs     12/22/20  0335 12/23/20  0315   WBC 10.7 10.9   RBC 4.18 4.06   HGB 12.6 12.1   HCT 38.1 37.9   MCV 91.1 93.3   MCH 30.1 29.8   MCHC 33.1 31.9*   RDW 13.2 13.4    183   MPV 9.7 9.2       CMP:    Lab Results   Component Value Date     12/23/2020    K 3.6 12/23/2020     12/23/2020    CO2 27 12/23/2020    BUN 19 12/23/2020    CREATININE 0.8 12/23/2020    GFRAA >60 12/23/2020    LABGLOM >60 12/23/2020    GLUCOSE 109 12/23/2020    PROT 6.3 12/23/2020    LABALBU 2.9 12/23/2020    CALCIUM 8.7 12/23/2020    BILITOT 0.3 12/23/2020    ALKPHOS 50 12/23/2020    AST 36 12/23/2020    ALT 11 12/23/2020        Radiology:   XR CHEST PORTABLE   Final Result   Worsening airspace disease which is generalized and is likely a manifestation   of worsening pneumonia. CT CHEST WO CONTRAST   Final Result   Patchy bilateral alveolar and interstitial infiltrates most notably in the   right upper, middle and left lower lobes probably representing pneumonia. Other infectious/inflammatory processes such as COVID-19 are also considered. Coronary artery/atherosclerotic disease   FINDINGS:   Mediastinum:   Lungs/pleura:   Upper Abdomen:   Soft Tissues/Bones:      XR CHEST PORTABLE   Final Result   Vague ill-defined infiltrate in the right parahilar region. The focal   pneumonia considered. Assessment:    Active Problems:    Acute respiratory failure with hypoxia (HCC)    COVID-19    Pneumonia due to COVID-19 virus    Thrombocytopenia (HCC)  Resolved Problems:    * No resolved hospital problems. *      Plan:  1. Acute respiratory failure with hypoxia (o2sat 89%)POA adjust o2 as needed. Pulmonary consulted  2. Pneumonia due to covid 19  continue steroids and remdesivir. toci per pulm  3. Hypokalemia monitor and replace prn  4. Hypomagnesemia monitor and replace prn  5.  Thrombocytopenia monitor  6. htn continue med    D/w pulm    Electronically signed by Amrik Sykes DO on 12/23/2020 at 7:54 PM

## 2020-12-25 NOTE — PROGRESS NOTES
[facility] Hospitalist   Progress Note    Admitting Date and Time: 12/19/2020 11:18 AM  Admit Dx: Acute on chronic respiratory failure with hypoxia (HCC) [J96.21]    Subjective:    Patient was admitted with Acute on chronic respiratory failure with hypoxia ([facility] [J96.21]. Patient denies fever, chills, cp, n/v. Pt with some sob.      remdesivir IVPB  100 mg Intravenous Q24H    dexamethasone  6 mg Oral 2 times per day    amitriptyline  25 mg Oral Daily    amLODIPine  5 mg Oral Daily    metoprolol tartrate  25 mg Oral BID    perphenazine  4 mg Oral Daily    sodium chloride flush  10 mL Intravenous 2 times per day    enoxaparin  40 mg Subcutaneous Daily    Vitamin D  2,000 Units Oral Daily    ascorbic acid  500 mg Oral Daily    zinc sulfate  50 mg Oral Daily    pantoprazole  40 mg Oral QAM AC         sodium chloride, 30 mL, PRN      sodium chloride flush, 10 mL, PRN      promethazine, 12.5 mg, Q6H PRN    Or      ondansetron, 4 mg, Q6H PRN      polyethylene glycol, 17 g, Daily PRN      acetaminophen, 650 mg, Q6H PRN    Or      acetaminophen, 650 mg, Q6H PRN      guaiFENesin-dextromethorphan, 5 mL, Q4H PRN         Objective:    /64   Pulse 119   Temp 98.2 °F (36.8 °C) (Oral)   Resp 26   Ht 4' 11\" (1.499 m)   Wt 125 lb (56.7 kg)   SpO2 93%   BMI 25.25 kg/m²   Skin: warm and dry, no rash or erythema  Pulmonary/Chest: clear to auscultation bilaterally- no wheezes, rales or rhonchi, normal air movement, no respiratory distress  Cardiovascular: rhythm reg at rate of 110  Abdomen: soft, non-tender, non-distended, normal bowel sounds, no masses or organomegaly  Extremities: no cyanosis, no clubbing and no edema      Recent Labs     12/22/20  0333 12/23/20  0315 12/24/20  0550    135 136   K 3.6 3.6 3.9   CL 99 101 103   CO2 25 27 25   BUN 24* 19 19   CREATININE 0.8 0.8 0.6   GLUCOSE 126* 109* 161*   CALCIUM 9.3 8.7 8.8       Recent Labs     12/22/20  0333 12/23/20  0315 region. The focal   pneumonia considered. Assessment:    Active Problems:    Acute respiratory failure with hypoxia (HCC)    COVID-19    Pneumonia due to COVID-19 virus    Thrombocytopenia (HCC)    Hyperglycemia  Resolved Problems:    * No resolved hospital problems. *      Plan:  1. Acute respiratory failure with hypoxia (o2sat 89%)POA adjust o2 as needed. Pulmonary following  2. Pneumonia due to covid 19  continue steroids and remdesivir.  toci per pulm  3. Hypokalemia monitor and replace prn  4. Hypomagnesemia monitor and replace prn  5. Hyperglycemia likely due to stress/steroids monitor  6. Thrombocytopenia monitor improving  7. htn continue med    Continue current tx. Pt had some difficulty adjusting to tx yesterday but tolerating tx at this time. Prognosis is still guarded overall. Once again, pt is a dnr cca. If pt would require intubation, pt would want comfort measures at that point and would not want to be intubated.  (do not intubate)    Electronically signed by Micaela Blair DO on 12/25/2020 at 3:15 AM

## 2020-12-25 NOTE — PLAN OF CARE
Problem: Airway Clearance - Ineffective  Goal: Achieve or maintain patent airway  Outcome: Ongoing     Problem: Gas Exchange - Impaired  Goal: Absence of hypoxia  Outcome: Ongoing     Problem: Isolation Precautions - Risk of Spread of Infection  Goal: Prevent transmission of infection  Outcome: Ongoing     Problem: Falls - Risk of:  Goal: Will remain free from falls  Description: Will remain free from falls  Outcome: Met This Shift  Goal: Absence of physical injury  Description: Absence of physical injury  Outcome: Met This Shift

## 2020-12-26 NOTE — PROGRESS NOTES
34  Mask Type: Full face mask  Mask Size: Small      CURRENT MEDS :  Scheduled Meds:   remdesivir IVPB  100 mg Intravenous Q24H    dexamethasone  6 mg Oral 2 times per day    amitriptyline  25 mg Oral Daily    amLODIPine  5 mg Oral Daily    metoprolol tartrate  25 mg Oral BID    perphenazine  4 mg Oral Daily    sodium chloride flush  10 mL Intravenous 2 times per day    enoxaparin  40 mg Subcutaneous Daily    Vitamin D  2,000 Units Oral Daily    ascorbic acid  500 mg Oral Daily    zinc sulfate  50 mg Oral Daily    pantoprazole  40 mg Oral QAM AC       Physical Exam:  General Appearance: appears comfortable in no acute distress. HEENT: Normocephalic atraumatic without obvious abnormality   Neck: Lips, mucosa, and tongue normal.  Supple, symmetrical, trachea midline, no adenopathy;thyroid:  no enlargement/tenderness/nodules or JVD. Lung: Breath sounds inspiratory crackles bilaterally right greater than left respirations slightly labored mild tachypnea symmetrical expansion. Heart: RRR, normal S1, S2. No MRG  Abdomen: Soft, NT, ND. BS present x 4 quadrants. No bruit or organomegaly. Extremities: Pedal pulses 2+ symmetric b/l. Extremities normal, no cyanosis, clubbing, trace bilateral lower extremity edema  Musculokeletal: No joint swelling, no muscle tenderness. ROM normal in all joints of extremities. Neurologic: Mental status: Alert and Oriented X3 . Pertinent/ New Labs and Imaging Studies     Imaging Personally Reviewed:  Chest x-ray 12/23/2020  ORDERING SYSTEM PROVIDED HISTORY: covid, worsening hypoxia   TECHNOLOGIST PROVIDED HISTORY:   Reason for exam:->covid, worsening hypoxia   FINDINGS:   Bilateral airspace disease is generalized and may relate to pneumonia. Stable cardiomediastinal silhouette.  Neither costophrenic angle is clearly   blunted.       Impression   Worsening airspace disease which is generalized and is likely a manifestation   of worsening pneumonia.        CT chest without contrast 12/19/2020  Patchy bilateral alveolar and interstitial infiltrates most notably in the   right upper, middle and left lower lobes probably representing pneumonia. Other infectious/inflammatory processes such as COVID-19 are also considered. Coronary artery/atherosclerotic disease   FINDINGS:   Mediastinum:   Lungs/pleura:   Upper Abdomen:   Soft Tissues/Bones:         ECHO  2019        Summary   Left ventricular internal dimensions were normal in diastole and systole. No regional wall motion abnormalities seen. Normal left ventricular ejection fraction. There is doppler evidence of stage I diastolic dysfunction. Focal calcification mitral valve leaflets. Mild mitral annular calcification. The aortic valve appears mildly sclerotic. Labs:  Lab Results   Component Value Date    WBC 13.2 12/26/2020    HGB 13.3 12/26/2020    HCT 40.8 12/26/2020    MCV 91.1 12/26/2020    MCH 29.7 12/26/2020    MCHC 32.6 12/26/2020    RDW 13.5 12/26/2020     12/26/2020    MPV 9.0 12/26/2020     Lab Results   Component Value Date     12/26/2020    K 4.0 12/26/2020     12/26/2020    CO2 24 12/26/2020    BUN 35 12/26/2020    CREATININE 0.6 12/26/2020    LABALBU 3.0 12/26/2020    CALCIUM 8.8 12/26/2020    GFRAA >60 12/26/2020    LABGLOM >60 12/26/2020     Lab Results   Component Value Date    PROTIME 13.7 12/19/2020    INR 1.2 12/19/2020     No results for input(s): PROBNP in the last 72 hours. No results for input(s): PROCAL in the last 72 hours. This SmartLink has not been configured with any valid records. 12/23/2020  Ferritin 837    CRP 10.8    Micro:  No results for input(s): CULTRESP in the last 72 hours. No results for input(s): LABGRAM in the last 72 hours. No results for input(s): LEGUR in the last 72 hours. Recent Labs     12/23/20  1650   STREPNEUMAGU Presumptive negative- suggests no current or recent  pneumococcal infection.   Infection due to Strep pneumoniae cannot be  ruled out since the antigen present in the sample  may be below the detection limit of the test.  Normal Range:Presumptive Negative       Recent Labs     12/23/20  1650   LP1UAG Presumptive Negative -suggesting no recent or current infections  with Legionella pneumophila serogroup 1. Infection to Legionella cannot be ruled out since other serogroups  and species may cause infection, antigen may not be present in  early infection, or level of antigen may be below the  detection limit. Normal Range: Presumptive Negative       Strep pneumo antigen, Legionella urine antigen pending  Procalcitonin 0.11   Assessment:    1. Acute respiratory failure with hypoxia  2. Severe COVID-19 pneumonitis  3. Elevated inflammatory markers  4. Lymphopenia  5. Sinus tachycardia    Plan:   1. Oxygen therapy and chest to heated high flow nasal cannula to maintain saturation greater than 90%  2. Add noninvasive ventilation in the form of AVAPS as needed for respiratory support target volume 400-450  3. COVID-19 treatment-continue remdesivir to complete 5 total days. Tocilizumab given x1 dose 12/23/2020. Decadron 6 mg p.o. twice daily, continue vitamin protocol  4. Trend inflammatory markers. Procalcitonin 0.11  5. Repeat chest x-ray today. Twelve-lead EKG per primary team.  proBNP 334  6. GI prophylaxis  7. Incentive spirometer  8. Lovenox 40 mg subcu daily for DVT prophylaxis. D-dimer 745    Again does not want to be intubated.   Will change CODE STATUS to a limited DNR and DNI    Electronically signed by Carole Arriola DO on 12/26/2020 at 11:49 AM

## 2020-12-26 NOTE — PROGRESS NOTES
12/26/2020  4:17 PM      Comprehensive Nutrition Assessment    Type and Reason for Visit:  Initial(LOS)    Nutrition Recommendations/Plan: Continue diet and ONS, as tolerated    Nutrition Assessment:  Pt admit w/severe Covid+ pneumonitis. She does not want intubated and code changed to reflect her wishes. She is on HFNC and AVAPS as need. Will add ONS to current diet to optimize nutrient intake    Malnutrition Assessment:  Malnutrition Status: At risk for malnutrition (Comment)    Context:  Acute Illness     Findings of the 6 clinical characteristics of malnutrition:  Energy Intake:  7 - 50% or less of estimated energy requirements for 5 or more days  Weight Loss:  No significant weight loss     Body Fat Loss:  Unable to assess     Muscle Mass Loss:  Unable to assess    Fluid Accumulation:  No significant fluid accumulation     Strength:  Not Performed    Estimated Daily Nutrient Needs:  Energy (kcal):  ; Weight Used for Energy Requirements:  Current     Protein (g):  60-70 (1.3-1.5 g/kg); Weight Used for Protein Requirements:  Ideal(IBW = 98#)        Fluid (ml/day):  ; Method Used for Fluid Requirements:  1 ml/kcal      Nutrition Related Findings:  A&Ox4, abd WNL, no edema, Heated HFNC      Wounds:  (blanchable redness buttocks and discoloration of foot from hx polio)       Current Nutrition Therapies:    DIET LOW SODIUM 2 GM; Anthropometric Measures:  · Height: 4' 11\" (149.9 cm)  · Current Body Weight: 125 lb (56.7 kg)(12/23 method not stated)   · Admission Body Weight: 125 lb (56.7 kg)(12/19 method not documented)    · Usual Body Weight:    129 lb (per EMR OV x 1 yr ago   · Ideal Body Weight: 98 lbs; % Ideal Body Weight 128 %   · BMI: 25.2  · BMI Categories: Overweight (BMI 25.0-29. 9)       Nutrition Diagnosis:   · Inadequate oral intake related to impaired respiratory function(2/2 Covid PNA) as evidenced by intake 0-25%      Nutrition Interventions:   Food and/or Nutrient Delivery: Continue Current Diet, Start Oral Nutrition Supplement  Nutrition Education/Counseling:  No recommendation at this time   Coordination of Nutrition Care:  Continue to monitor while inpatient    Goals:  PO >50% at meals/ONS       Nutrition Monitoring and Evaluation:   Behavioral-Environmental Outcomes:  None Identified   Food/Nutrient Intake Outcomes:  Food and Nutrient Intake, Supplement Intake  Physical Signs/Symptoms Outcomes:  Biochemical Data, Fluid Status or Edema, Nutrition Focused Physical Findings, Skin, Weight     Discharge Planning:     Too soon to determine     Electronically signed by Patricia Vasques RD, CNSC, LD on 12/26/20 at 4:17 PM EST    Contact: [phone]

## 2020-12-26 NOTE — PROGRESS NOTES
[facility] Quality Flow/Interdisciplinary Rounds Progress Note        Quality Flow Rounds held on December 26, 2020    Disciplines Attending:  Bedside Nurse, ,  and Nursing Unit Leadership    Manuel Madison was admitted on 12/19/2020 11:18 AM    Anticipated Discharge Date:  Expected Discharge Date: 12/22/20    Disposition:    Osiel Score:  Osiel Scale Score: 16    Readmission Risk              Risk of Unplanned Readmission:        17           Discussed patient goal for the day, patient clinical progression, and barriers to discharge. The following Goal(s) of the Day/Commitment(s) have been identified: Remdesivir (4/5) IV, attempt to wean O2 as tolerated, check Medical plan.        Makeda Coelho  December 26, 2020

## 2020-12-26 NOTE — PROGRESS NOTES
[facility] Progress Note    Admitting Date and Time: 12/19/2020 11:18 AM  Admit Dx: Acute on chronic respiratory failure with hypoxia (HCC) [J96.21]    Subjective:  Patient is being followed for Acute on chronic respiratory failure with hypoxia ([address] [J96.21]     Pt was seen and examined this AM, awake, alert and oriented. Still complains of some SOB. Otherwise, no new complaints or acute overnight events.  -Still with high oxygen requirements.     ROS: denies fever, chills, cp, n/v, HA unless stated above     remdesivir IVPB  100 mg Intravenous Q24H    dexamethasone  6 mg Oral 2 times per day    amitriptyline  25 mg Oral Daily    amLODIPine  5 mg Oral Daily    metoprolol tartrate  25 mg Oral BID    perphenazine  4 mg Oral Daily    sodium chloride flush  10 mL Intravenous 2 times per day    enoxaparin  40 mg Subcutaneous Daily    Vitamin D  2,000 Units Oral Daily    ascorbic acid  500 mg Oral Daily    zinc sulfate  50 mg Oral Daily    pantoprazole  40 mg Oral QAM AC         sodium chloride, 30 mL, PRN      sodium chloride flush, 10 mL, PRN      promethazine, 12.5 mg, Q6H PRN    Or      ondansetron, 4 mg, Q6H PRN      polyethylene glycol, 17 g, Daily PRN      acetaminophen, 650 mg, Q6H PRN    Or      acetaminophen, 650 mg, Q6H PRN      guaiFENesin-dextromethorphan, 5 mL, Q4H PRN         Objective:    BP (!) 120/53   Pulse 109   Temp 98 °F (36.7 °C) (Axillary)   Resp 26   Ht 4' 11\" (1.499 m)   Wt 125 lb (56.7 kg)   SpO2 97%   BMI 25.25 kg/m²     General Appearance: alert and oriented to person, place and time and in no acute distress  Skin: warm and dry  Head: normocephalic and atraumatic  Eyes: pupils equal, round, and reactive to light, extraocular eye movements intact, conjunctivae normal  Neck: neck supple and non tender without mass   Pulmonary/Chest: clear to auscultation bilaterally- no wheezes, rales or rhonchi, normal air movement, no respiratory Pulmonology following  -Remains with high oxygen requirements  -D dimer 806, on Lovenox 40mg QD    2. Hyperglycemia  -In the setting of steroids use  -Monitor for now    3. Hypertension  -Controlled  -Continue current regimen and resume home meds as able      NOTE: This report was transcribed using voice recognition software. Every effort was made to ensure accuracy; however, inadvertent computerized transcription errors may be present.   Electronically signed by Debbie Garcia MD on 12/26/2020 at 11:16 AM

## 2020-12-26 NOTE — PLAN OF CARE
Problem: Inadequate oral food/beverage intake (NI-2.1)  Goal: Food and/or Nutrient Delivery  Pt will consume >50% at meals and ONS  Description: Individualized approach for food/nutrient provision.   Outcome: Met This Shift

## 2020-12-26 NOTE — PROGRESS NOTES
Date: 12/25/2020    Time: 8:14 PM    Patient Placed On BIPAP/CPAP/ Non-Invasive Ventilation? Yes    If no must comment. Facial area red/color change? No           If YES are Blister/Lesion present? No   If yes must notify nursing staff  BIPAP/CPAP skin barrier?   Yes    Skin barrier type:mepilexlite       Comments:        Frances Tai

## 2020-12-27 NOTE — PROGRESS NOTES
[facility] Quality Flow/Interdisciplinary Rounds Progress Note        Quality Flow Rounds held on December 27, 2020    Disciplines Attending:  Bedside Nurse, ,  and Nursing Unit Leadership    Edilberto Logan was admitted on 12/19/2020 11:18 AM    Anticipated Discharge Date:  Expected Discharge Date: 12/22/20    Disposition:    Osiel Score:  Osiel Scale Score: 19    Readmission Risk              Risk of Unplanned Readmission:        17           Discussed patient goal for the day, patient clinical progression, and barriers to discharge. The following Goal(s) of the Day/Commitment(s) have been identified:  Remedsivir completed today, continue to wean O2 as tolerated, check Pulmonary plan.       Skylar Feliz  December 27, 2020

## 2020-12-27 NOTE — PROGRESS NOTES
Kadeem Marroquin M.D.,[facility]  Nancy Weiss D.O., F.A.C.O.I., Vanessa Valerio M.D. Jeffery Ellis M.D., Yves Hanson M.D. Manuel Lopez D.O. Daily Pulmonary Progress Note    Patient:  Soheila Schmidt 80 y.o. female MRN: [id number]     Date of Service: 12/27/2020      Synopsis     We are following patient for respiratory failure with hypoxia, severe COVID-19 pneumonitis    \"CC\" shortness of breath    Code status: Full      Subjective      Patient was seen and examined. Complaining of shortness of breath at rest.    Still requiring nonrebreather and heated high flow. No significant change. Review of Systems:  Constitutional: Denies fever, weight loss, night sweats, positive for fatigue  Skin: Denies pigmentation, dark lesions, and rashes   HEENT: Denies hearing loss, tinnitus, ear drainage, epistaxis, sore throat, and hoarseness. Cardiovascular: Denies palpitations, chest pain, and chest pressure.   Respiratory: Positive shortness of breath  Gastrointestinal: Denies nausea, vomiting, poor appetite, diarrhea, heartburn or reflux  Genitourinary: Denies dysuria, frequency, urgency or hematuria  Musculoskeletal: Denies myalgias, muscle weakness, and bone pain  Neurological: Denies dizziness, vertigo, headache, and focal weakness  Psychological: Denies anxiety and depression  Endocrine: Denies heat intolerance and cold intolerance  Hematopoietic/Lymphatic: Denies bleeding problems and blood transfusions    24-hour events:  none    Objective   Vitals: /61   Pulse 111   Temp 97.8 °F (36.6 °C) (Oral)   Resp 16   Ht 4' 11\" (1.499 m)   Wt 125 lb (56.7 kg)   SpO2 92%   BMI 25.25 kg/m²     I/O:  No intake or output data in the 24 hours ending 12/27/20 1058    Vent Information  Skin Assessment: Clean, dry, & intact  Vt Ordered: (S) 450 mL  FiO2 : 100 %  SpO2: 92 %  SpO2/FiO2 ratio: 92  I Time/ I Time %: 1 s  Humidification Source: HME  Humidification Temp: 34  Humidification Temp Measured: 34  Mask Type: Full face mask  Mask Size: Small      CURRENT MEDS :  Scheduled Meds:   dexamethasone  6 mg Oral 2 times per day    amitriptyline  25 mg Oral Daily    amLODIPine  5 mg Oral Daily    metoprolol tartrate  25 mg Oral BID    perphenazine  4 mg Oral Daily    sodium chloride flush  10 mL Intravenous 2 times per day    enoxaparin  40 mg Subcutaneous Daily    Vitamin D  2,000 Units Oral Daily    ascorbic acid  500 mg Oral Daily    zinc sulfate  50 mg Oral Daily    pantoprazole  40 mg Oral QAM AC       Physical Exam:  General Appearance: appears comfortable in no acute distress. HEENT: Normocephalic atraumatic without obvious abnormality   Neck: Lips, mucosa, and tongue normal.  Supple, symmetrical, trachea midline, no adenopathy;thyroid:  no enlargement/tenderness/nodules or JVD. Lung: Breath sounds inspiratory crackles bilaterally right greater than left respirations slightly labored mild tachypnea symmetrical expansion. Heart: RRR, normal S1, S2. No MRG  Abdomen: Soft, NT, ND. BS present x 4 quadrants. No bruit or organomegaly. Extremities: Pedal pulses 2+ symmetric b/l. Extremities normal, no cyanosis, clubbing, trace bilateral lower extremity edema  Musculokeletal: No joint swelling, no muscle tenderness. ROM normal in all joints of extremities. Neurologic: Mental status: Alert and Oriented X3 . Pertinent/ New Labs and Imaging Studies     Imaging Personally Reviewed:  Chest x-ray 12/23/2020  ORDERING SYSTEM PROVIDED HISTORY: covid, worsening hypoxia   TECHNOLOGIST PROVIDED HISTORY:   Reason for exam:->covid, worsening hypoxia   FINDINGS:   Bilateral airspace disease is generalized and may relate to pneumonia. Stable cardiomediastinal silhouette.  Neither costophrenic angle is clearly   blunted.       Impression   Worsening airspace disease which is generalized and is likely a manifestation   of worsening pneumonia.        CT chest without contrast 12/19/2020  Patchy Assessment:    1. Acute respiratory failure with hypoxia  2. Severe COVID-19 pneumonitis  3. Elevated inflammatory markers  4. Lymphopenia  5. Sinus tachycardia    Plan:   1. Oxygen therapy and chest to heated high flow nasal cannula to maintain saturation greater than 90%  2. Add noninvasive ventilation in the form of AVAPS as needed for respiratory support target volume 400-450  3. COVID-19 treatment-continue remdesivir to complete 5 total days. Tocilizumab given x1 dose 12/23/2020. Decadron 6 mg p.o. twice daily, continue vitamin protocol  4. Trend inflammatory markers. Procalcitonin 0.11  5. Repeat chest x-ray today. Twelve-lead EKG per primary team.  proBNP 334  6. GI prophylaxis  7. Incentive spirometer  8. Lovenox 40 mg subcu daily for DVT prophylaxis. D-dimer 745  9.  Follow CXR      Code Status: Limited      Electronically signed by Remonia Phalen, DO on 12/27/2020 at 10:58 AM

## 2020-12-28 NOTE — PROGRESS NOTES
Date: 12/27/2020    Time: 8:34 PM    Patient Placed On BIPAP/CPAP/ Non-Invasive Ventilation? Yes  Facial area red/color change? No        BIPAP/CPAP skin barrier?   Yes    Skin barrier type:mepilexlite       Comments:       12/27/20 2030   NIV Type   NIV Started/Stopped On   Equipment Type V60   Mode AVAPS   Mask Type Full face mask   Mask Size Small   Settings/Measurements   CPAP/EPAP 10 cmH2O   IPAP Min 12 cmH2O   IPAP Max 25 cmH2O   Vt Ordered 450 mL   Rate Ordered 14   Resp 21   Insp Rise Time (%) 2 %   FiO2  100 %   I Time/ I Time % 1 s   Vt Exhaled 450 mL   Minute Volume 10.2 Liters   Mask Leak (lpm) 48 lpm   Comfort Level Good   Using Accessory Muscles No   SpO2 91       Rachelle Lisa

## 2020-12-28 NOTE — CARE COORDINATION
Social work / Discharge Planning:       [address]. Patient remains on 60 liters of high flow oxygen. She has orders for therapy evaluations to assist in determining discharge needs. Social work will follow.   Electronically signed by KEN Gardner on 12/28/2020 at 2:28 PM

## 2020-12-28 NOTE — PROGRESS NOTES
Pt bladder scanned for 740cc. Dr. Ronald Ladd notified and patient was straight-cathed for 550cc of output. Will continue to monitor and recheck for urinary retention.

## 2020-12-28 NOTE — CARE COORDINATION
CM NOTE: Per QFR---covid positive in droplet plus isolation. Limited code. Continues on po decadron, using O2 60L which she does not use at home. Therapy evals pending. Son discharged home last week but unsure how much asst he can provide to pt. May need rehab stay. CM & SW will continue to follow pt.

## 2020-12-28 NOTE — PROGRESS NOTES
Saida Sawyer M.D.,[facility]  Rajani Reaves D.O., F.A.C.O.I., Tono Rose M.D. Divine Pemberton M.D., Pearson Favre ,M.D. Maddie Morris D.O. Daily Pulmonary Progress Note    Patient:  Andre Schmidt 80 y.o. female MRN: [id number]     Date of Service: 12/28/2020      Synopsis     We are following patient for respiratory failure with hypoxia, severe COVID-19 pneumonitis    \"CC\" shortness of breath    Code status: Full      Subjective      Patient was seen and examined. Complaining of shortness of breath at rest.    Still requiring nonrebreather and heated high flow. No significant change. Doing poorly overall. Discussed with primary team will consult palliative medicine. Review of Systems:  Constitutional: Denies fever, weight loss, night sweats, positive for fatigue  Skin: Denies pigmentation, dark lesions, and rashes   HEENT: Denies hearing loss, tinnitus, ear drainage, epistaxis, sore throat, and hoarseness. Cardiovascular: Denies palpitations, chest pain, and chest pressure.   Respiratory: Positive shortness of breath  Gastrointestinal: Denies nausea, vomiting, poor appetite, diarrhea, heartburn or reflux  Genitourinary: Denies dysuria, frequency, urgency or hematuria  Musculoskeletal: Denies myalgias, muscle weakness, and bone pain  Neurological: Denies dizziness, vertigo, headache, and focal weakness  Psychological: Denies anxiety and depression  Endocrine: Denies heat intolerance and cold intolerance  Hematopoietic/Lymphatic: Denies bleeding problems and blood transfusions    24-hour events:  none    Objective   Vitals: BP (!) 118/56   Pulse 111   Temp 97.1 °F (36.2 °C) (Axillary)   Resp 22   Ht 4' 11\" (1.499 m)   Wt 125 lb (56.7 kg)   SpO2 (!) 87%   BMI 25.25 kg/m²     I/O:  No intake or output data in the 24 hours ending 12/28/20 1712    Vent Information  Skin Assessment: Clean, dry, & intact  Equipment Changed: Humidification  Vt Ordered: 450 mL  FiO2 : 100 %  SpO2: (!) 87 %  SpO2/FiO2 ratio: 89  I Time/ I Time %: 1 s  Humidification Source: Heated wire(h2o bag changed)  Humidification Temp: 34  Humidification Temp Measured: 34  Mask Type: Full face mask  Mask Size: Small      CURRENT MEDS :  Scheduled Meds:   tamsulosin  0.4 mg Oral Daily    dexamethasone  6 mg Oral 2 times per day    amitriptyline  25 mg Oral Daily    amLODIPine  5 mg Oral Daily    metoprolol tartrate  25 mg Oral BID    perphenazine  4 mg Oral Daily    sodium chloride flush  10 mL Intravenous 2 times per day    enoxaparin  40 mg Subcutaneous Daily    Vitamin D  2,000 Units Oral Daily    ascorbic acid  500 mg Oral Daily    zinc sulfate  50 mg Oral Daily    pantoprazole  40 mg Oral QAM AC       Physical Exam:  General Appearance: appears comfortable in no acute distress. HEENT: Normocephalic atraumatic without obvious abnormality   Neck: Lips, mucosa, and tongue normal.  Supple, symmetrical, trachea midline, no adenopathy;thyroid:  no enlargement/tenderness/nodules or JVD. Lung: Breath sounds inspiratory crackles bilaterally right greater than left respirations slightly labored mild tachypnea symmetrical expansion. Heart: RRR, normal S1, S2. No MRG  Abdomen: Soft, NT, ND. BS present x 4 quadrants. No bruit or organomegaly. Extremities: Pedal pulses 2+ symmetric b/l. Extremities normal, no cyanosis, clubbing, trace bilateral lower extremity edema  Musculokeletal: No joint swelling, no muscle tenderness. ROM normal in all joints of extremities. Neurologic: Mental status: Alert and Oriented X3 . Pertinent/ New Labs and Imaging Studies     Imaging Personally Reviewed:  Chest x-ray 12/28     Reason for exam:->resp failure   FINDINGS:   There are patchy infiltrate seen within the right perihilar and right lower   lobe. Ritesh Domingo is an infiltrate seen within the left lung base.       Impression   1. Multifocal right perihilar and right lower lobe infiltrates.    2. Left lung base infiltrate. CT chest without contrast 12/19/2020  Patchy bilateral alveolar and interstitial infiltrates most notably in the   right upper, middle and left lower lobes probably representing pneumonia. Other infectious/inflammatory processes such as COVID-19 are also considered. Coronary artery/atherosclerotic disease   FINDINGS:   Mediastinum:   Lungs/pleura:   Upper Abdomen:   Soft Tissues/Bones:         ECHO  2019        Summary   Left ventricular internal dimensions were normal in diastole and systole. No regional wall motion abnormalities seen. Normal left ventricular ejection fraction. There is doppler evidence of stage I diastolic dysfunction. Focal calcification mitral valve leaflets. Mild mitral annular calcification. The aortic valve appears mildly sclerotic. Labs:  Lab Results   Component Value Date    WBC 14.6 12/28/2020    HGB 13.3 12/28/2020    HCT 42.1 12/28/2020    MCV 91.9 12/28/2020    MCH 29.0 12/28/2020    MCHC 31.6 12/28/2020    RDW 13.3 12/28/2020     12/28/2020    MPV 8.8 12/28/2020     Lab Results   Component Value Date     12/28/2020    K 4.4 12/28/2020     12/28/2020    CO2 23 12/28/2020    BUN 37 12/28/2020    CREATININE 0.6 12/28/2020    LABALBU 3.1 12/28/2020    CALCIUM 9.0 12/28/2020    GFRAA >60 12/28/2020    LABGLOM >60 12/28/2020     Lab Results   Component Value Date    PROTIME 13.7 12/19/2020    INR 1.2 12/19/2020     No results for input(s): PROBNP in the last 72 hours. No results for input(s): PROCAL in the last 72 hours. This SmartLink has not been configured with any valid records. 12/23/2020  Ferritin 837    CRP 10.8    Micro:  No results for input(s): CULTRESP in the last 72 hours. No results for input(s): LABGRAM in the last 72 hours. No results for input(s): LEGUR in the last 72 hours. No results for input(s): STREPNEUMAGU in the last 72 hours. No results for input(s): LP1UAG in the last 72 hours.   Strep pneumo antigen, Legionella urine antigen pending  Procalcitonin 0.11   Assessment:    1. Acute respiratory failure with hypoxia  2. Severe COVID-19 pneumonitis  3. Elevated inflammatory markers  4. Lymphopenia  5. Sinus tachycardia    Plan:   1. Oxygen therapy -heated high flow nasal cannula to maintain saturation greater than 90% 60 L FiO2 100%  2. AVAPS as needed for respiratory support target volume 400-450  3. COVID-19 treatment-continue remdesivir to complete 5 total days. Tocilizumab given x1 dose 12/23/2020. Decadron 6 mg p.o. twice daily, continue vitamin protocol  4. Trend inflammatory markers. Procalcitonin 0.11  5. Chest x-ray today  6. GI prophylaxis  7. Incentive spirometer  8. Lovenox 40 mg subcu daily for DVT prophylaxis. D-dimer increased to 1239  9. Consult palliative medicine      Code Status: Limited    This plan of care was reviewed in collaboration with Dr. Davion Powell  Electronically signed by EMETERIO Paez CNP on 12/28/2020 at 5:12 PM      I personally saw, examined, and cared for the patient. Labs, medications, radiographs reviewed. I agree with history exam and plans detailed in NP note. Tresa Bo M.D.    Pulmonary/Critical Care Medicine

## 2020-12-28 NOTE — PROGRESS NOTES
[facility] Progress Note    Admitting Date and Time: 12/19/2020 11:18 AM  Admit Dx: Acute on chronic respiratory failure with hypoxia (HCC) [J96.21]    Subjective:  Patient is being followed for Acute on chronic respiratory failure with hypoxia ([address] [J96.21]     Pt feels fine. Wants to know when will go home. Updated when oxygen on more manageable numbers. O2 sat:  94% on 60L     Fever:  98    Per RN: Finished morning care. Voices no complaints. ROS: denies cp, n/v, HA unless stated above.       dexamethasone  6 mg Oral 2 times per day    amitriptyline  25 mg Oral Daily    amLODIPine  5 mg Oral Daily    metoprolol tartrate  25 mg Oral BID    perphenazine  4 mg Oral Daily    sodium chloride flush  10 mL Intravenous 2 times per day    enoxaparin  40 mg Subcutaneous Daily    Vitamin D  2,000 Units Oral Daily    ascorbic acid  500 mg Oral Daily    zinc sulfate  50 mg Oral Daily    pantoprazole  40 mg Oral QAM AC         sodium chloride, 30 mL, PRN      sodium chloride flush, 10 mL, PRN      promethazine, 12.5 mg, Q6H PRN    Or      ondansetron, 4 mg, Q6H PRN      polyethylene glycol, 17 g, Daily PRN      acetaminophen, 650 mg, Q6H PRN    Or      acetaminophen, 650 mg, Q6H PRN      guaiFENesin-dextromethorphan, 5 mL, Q4H PRN    Objective:    BP (!) 146/67   Pulse 106   Temp 98 °F (36.7 °C) (Oral)   Resp 20   Ht 4' 11\" (1.499 m)   Wt 125 lb (56.7 kg)   SpO2 (!) 88%   BMI 25.25 kg/m²     General Appearance: alert and oriented to person, place and time and in no acute distress  Skin: warm and dry  Head: normocephalic and atraumatic  Eyes: pupils equal, round, and reactive to light, extraocular eye movements intact, conjunctivae normal  Neck: neck supple and non tender without mass   Pulmonary/Chest: crackles bilaterally- no wheezes, no respiratory distress  Cardiovascular: normal rate, normal S1 and S2 and no carotid bruits  Abdomen: soft, non-tender, non-distended, elevated. Monitor and treat accordingly. Avoid Hepatotoxic meds.       Electronically signed by Charles Ruvalcaba MD on 12/28/2020 at 11:11 AM

## 2020-12-28 NOTE — PROGRESS NOTES
Bladder Scan Volume - 437 - Dr. Pooja Gould notified, recheck at [facility], [facility]

## 2020-12-28 NOTE — PROGRESS NOTES
[facility] Hospitalist   Progress Note    Admitting Date and Time: 12/19/2020 11:18 AM  Admit Dx: Acute on chronic respiratory failure with hypoxia (HCC) [J96.21]    Subjective:    Patient was admitted with Acute on chronic respiratory failure with hypoxia ([address] [J96.21].  Patient denies fever, chills, cp, sob, n/v.     dexamethasone  6 mg Oral 2 times per day    amitriptyline  25 mg Oral Daily    amLODIPine  5 mg Oral Daily    metoprolol tartrate  25 mg Oral BID    perphenazine  4 mg Oral Daily    sodium chloride flush  10 mL Intravenous 2 times per day    enoxaparin  40 mg Subcutaneous Daily    Vitamin D  2,000 Units Oral Daily    ascorbic acid  500 mg Oral Daily    zinc sulfate  50 mg Oral Daily    pantoprazole  40 mg Oral QAM AC         sodium chloride, 30 mL, PRN      sodium chloride flush, 10 mL, PRN      promethazine, 12.5 mg, Q6H PRN    Or      ondansetron, 4 mg, Q6H PRN      polyethylene glycol, 17 g, Daily PRN      acetaminophen, 650 mg, Q6H PRN    Or      acetaminophen, 650 mg, Q6H PRN      guaiFENesin-dextromethorphan, 5 mL, Q4H PRN         Objective:    /69   Pulse 109   Temp 98.1 °F (36.7 °C) (Oral)   Resp 21   Ht 4' 11\" (1.499 m)   Wt 125 lb (56.7 kg)   SpO2 91%   BMI 25.25 kg/m²   Skin: warm and dry, no rash or erythema  Pulmonary/Chest: clear to auscultation bilaterally- no wheezes, rales or rhonchi, normal air movement, no respiratory distress  Cardiovascular: rhythm reg at rate of 100  Abdomen: soft, non-tender, non-distended, normal bowel sounds, no masses or organomegaly  Extremities: no cyanosis, no clubbing and no edema      Recent Labs     12/25/20 0520 12/26/20 0520 12/27/20  0300    137 134   K 3.8 4.0 4.4    103 102   CO2 24 24 24   BUN 30* 35* 37*   CREATININE 0.6 0.6 0.6   GLUCOSE 154* 137* 148*   CALCIUM 9.1 8.8 9.2       Recent Labs     12/25/20 0520 12/26/20  0520 12/27/20  0300   WBC 12.7* 13.2* 11.5   RBC 4.43 4.48 4.69 HGB 13.1 13.3 13.8   HCT 39.8 40.8 43.0   MCV 89.8 91.1 91.7   MCH 29.6 29.7 29.4   MCHC 32.9 32.6 32.1   RDW 13.3 13.5 13.4    367 351   MPV 9.4 9.0 8.8       CBC with Differential:    Lab Results   Component Value Date    WBC 11.5 12/27/2020    RBC 4.69 12/27/2020    HGB 13.8 12/27/2020    HCT 43.0 12/27/2020     12/27/2020    MCV 91.7 12/27/2020    MCH 29.4 12/27/2020    MCHC 32.1 12/27/2020    RDW 13.4 12/27/2020    NRBC 0.0 12/24/2020    METASPCT 0.9 07/17/2019    LYMPHOPCT 8.4 12/27/2020    MONOPCT 4.7 12/27/2020    MYELOPCT 0.9 12/24/2020    BASOPCT 0.2 12/27/2020    MONOSABS 0.54 12/27/2020    LYMPHSABS 0.96 12/27/2020    EOSABS 0.00 12/27/2020    BASOSABS 0.02 12/27/2020     CMP:    Lab Results   Component Value Date     12/27/2020    K 4.4 12/27/2020     12/27/2020    CO2 24 12/27/2020    BUN 37 12/27/2020    CREATININE 0.6 12/27/2020    GFRAA >60 12/27/2020    LABGLOM >60 12/27/2020    GLUCOSE 148 12/27/2020    PROT 6.3 12/27/2020    LABALBU 3.2 12/27/2020    CALCIUM 9.2 12/27/2020    BILITOT 0.6 12/27/2020    ALKPHOS 60 12/27/2020    AST 66 12/27/2020    ALT 37 12/27/2020        Radiology:   XR CHEST PORTABLE   Final Result   1. No interval change in the multifocal bilateral pulmonary infiltrates. XR CHEST PORTABLE   Final Result   Worsening airspace disease which is generalized and is likely a manifestation   of worsening pneumonia. CT CHEST WO CONTRAST   Final Result   Patchy bilateral alveolar and interstitial infiltrates most notably in the   right upper, middle and left lower lobes probably representing pneumonia. Other infectious/inflammatory processes such as COVID-19 are also considered. Coronary artery/atherosclerotic disease   FINDINGS:   Mediastinum:   Lungs/pleura:   Upper Abdomen:   Soft Tissues/Bones:      XR CHEST PORTABLE   Final Result   Vague ill-defined infiltrate in the right parahilar region. The focal   pneumonia considered.       XR CHEST PORTABLE    (Results Pending)       Assessment:    Active Problems:    Acute respiratory failure with hypoxia (HCC)    COVID-19    Pneumonia due to COVID-19 virus    Thrombocytopenia (HCC)    Hyperglycemia    Acute respiratory distress  Resolved Problems:    * No resolved hospital problems. *      Plan:  1. Acute respiratory failure with hypoxia (o2sat 89%)POA  Continue to adjust o2 as needed. Pulmonary following  2. Pneumonia due to covid 19  continue steroids and finishing remdesivir.  toci per pulm  3. Elevated lfts monitor  4. Hypokalemia monitor and replace prn  5. Hypomagnesemia monitor and replace prn  6. Hyperglycemia likely due to stress/steroids monitor  7.  Thrombocytopenia monitor improving  8. htn continue med        Electronically signed by Chester Urbina DO on 12/27/2020 at 11:29 PM

## 2020-12-28 NOTE — PROGRESS NOTES
[facility] Quality Flow/Interdisciplinary Rounds Progress Note        Quality Flow Rounds held on December 28, 2020    Disciplines Attending:  Bedside Nurse, ,  and Nursing Unit Leadership    Shira So was admitted on 12/19/2020 11:18 AM    Anticipated Discharge Date:  Expected Discharge Date: 12/22/20    Disposition:    Osiel Score:  Osiel Scale Score: 19    Readmission Risk              Risk of Unplanned Readmission:        18           Discussed patient goal for the day, patient clinical progression, and barriers to discharge. The following Goal(s) of the Day/Commitment(s) have been identified:  Patient is on 60liters and po decadron.         Refugia Parkinson  December 28, 2020

## 2020-12-28 NOTE — PROGRESS NOTES
Bladder Scan Volume 282 mL - Dr. Noel Foss notified - order received to repeat bladder scan in 6 hours

## 2020-12-29 NOTE — CONSULTS
failure with hypoxia ([facility] [address] [J96.01] 12/19/2020    COVID-19 [U07.1] 12/19/2020       Details of Conversation: Chart reviewed. Face-to-face encounter not performed due to COVID-19 isolation and conservation of PPE. Spoke with patient by telephone. Patient is alert and oriented, able to carry on meaningful medical conversation. Role of palliative medicine explained. Patient states she has a living will and healthcare power of . Patient identifies her daughter, Umesh Monique, as healthcare power of . Discussion regarding goals of care, CODE STATUS, and symptom management. At this time, patient wishes to continue current management however states her shortness of breath has become increasingly worse. We discussed morphine for shortness of breath/respiratory distress and patient is in agreement with medication plan. Patient wishes to continue limited code-no intubation, no defibrillation, no chest compressions, okay for resuscitative medications only. Spoke with patient's daughter, Umesh Monique. Umesh Monique explains that patient was independent at home prior to admission. 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 Umesh Monique is hopeful that patient can recover. Umesh Monique also states she is not in agreement with patient's CODE STATUS however understands that this is patient's wish. Support provided and questions answered. Will continue to follow.       OBJECTIVE:   Prognosis: unknown    Physical Exam:  BP (!) 112/59   Pulse 93   Temp 97 °F (36.1 °C) (Axillary)   Resp 27   Ht 4' 11\" (1.499 m)   Wt 125 lb (56.7 kg)   SpO2 91%   BMI 25.25 kg/m²    As per extensive chart review and bedside RN assessment:  Constitutional:  Awake, alert  Eyes: No scleral icterus, normal lids, no discharge  ENMT:  Normocephalic, atraumatic, mucosa moist, EOMI  Neck:  Trachea midline, no JVD  Lungs: Heated high flow nasal cannula, nonrebreather  Heart:  RRR  Abd:  Soft, non tender, non distended, bowel sounds present  Ext:  + edema, pulses present  Skin:  Warm and dry, no rashes on visible skin  Neuro:  Alert, oriented, able to carry on meaningful medical conversation    Objective data reviewed: labs, images, records, medication use, vitals and chart    Discussed patient and the plan of care with the other IDT members: Palliative Medicine IDT Team    Time/Communication  Greater than 50% of time spent, total 50 minutes in counseling and coordination of care at the bedside regarding goals of care, diagnosis and prognosis and see above. Thank you for allowing Palliative Medicine to participate in the care of [facility]. Note: This report was completed using [email or web address] voiced recognition software. Every effort has been made to ensure accuracy; however, inadvertent computerized transcription errors may be present.

## 2020-12-29 NOTE — PROGRESS NOTES
[facility] Progress Note    Admitting Date and Time: 12/19/2020 11:18 AM  Admit Dx: Acute on chronic respiratory failure with hypoxia (HCC) [J96.21]    Subjective:  Patient is being followed for Acute on chronic respiratory failure with hypoxia ([address] [J96.21]     Pt feels tired. Mask hurts ears. Has mask off but blowing air to face    O2 sat:  90% on 60L    Fever:  98    Per RN: reports no concerns    ROS: denies cp, n/v, HA unless stated above.  tamsulosin  0.4 mg Oral Daily    dexamethasone  6 mg Oral 2 times per day    amitriptyline  25 mg Oral Daily    amLODIPine  5 mg Oral Daily    metoprolol tartrate  25 mg Oral BID    perphenazine  4 mg Oral Daily    sodium chloride flush  10 mL Intravenous 2 times per day    enoxaparin  40 mg Subcutaneous Daily    Vitamin D  2,000 Units Oral Daily    ascorbic acid  500 mg Oral Daily    zinc sulfate  50 mg Oral Daily    pantoprazole  40 mg Oral QAM AC         morphine 20MG/ML, 2.5 mg, Q6H PRN      albuterol sulfate HFA, 2 puff, Q6H PRN      sodium chloride, 30 mL, PRN      sodium chloride flush, 10 mL, PRN      promethazine, 12.5 mg, Q6H PRN    Or      ondansetron, 4 mg, Q6H PRN      polyethylene glycol, 17 g, Daily PRN      acetaminophen, 650 mg, Q6H PRN    Or      acetaminophen, 650 mg, Q6H PRN      guaiFENesin-dextromethorphan, 5 mL, Q4H PRN    Objective:    /72   Pulse 61   Temp 98 °F (36.7 °C) (Oral)   Resp 23   Ht 4' 11\" (1.499 m)   Wt 125 lb (56.7 kg)   SpO2 90%   BMI 25.25 kg/m²     General Appearance: alert and oriented to person, place and time and in no acute distress.   Looks tired  Skin: warm and dry  Head: normocephalic and atraumatic  Eyes: pupils equal, round, and reactive to light, extraocular eye movements intact, conjunctivae normal  Neck: neck supple and non tender without mass   Pulmonary/Chest: crackles bilaterally- no wheezes, no respiratory distress  Cardiovascular: normal rate, normal S1 and S2 and no carotid bruits  Abdomen: soft, non-tender, non-distended, normal bowel sounds, no masses or organomegaly  Extremities: no cyanosis, no clubbing and no edema  Neurologic: no cranial nerve deficit and speech normal    Recent Labs     12/27/20 0300 12/28/20 0315 12/29/20  0347    136 134   K 4.4 4.4 4.6    102 102   CO2 24 23 23   BUN 37* 37* 34*   CREATININE 0.6 0.6 0.7   GLUCOSE 148* 152* 147*   CALCIUM 9.2 9.0 9.2       Recent Labs     12/27/20 0300 12/28/20 0315 12/29/20  0347   WBC 11.5 14.6* 17.1*   RBC 4.69 4.58 4.49   HGB 13.8 13.3 13.3   HCT 43.0 42.1 41.3   MCV 91.7 91.9 92.0   MCH 29.4 29.0 29.6   MCHC 32.1 31.6* 32.2   RDW 13.4 13.3 13.1    326 283   MPV 8.8 8.8 9.0       Radiology:  n/a    Assessment:    Active Problems:    Acute respiratory failure with hypoxia (HCC)    COVID-19    Pneumonia due to COVID-19 virus    Thrombocytopenia (HCC)    Hyperglycemia    Acute respiratory distress    Elevated LFTs  Resolved Problems:    * No resolved hospital problems. *      Plan:  1. Acute hypoxic respiratory failure, most likely due to viral pneumonia, O2 sat was below 90% on room air, requiring 60L nasal cannula and oxygen saturation above 90%. Treating the underlying process. 2.  Acute viral pneumonia, rapid influenza a and B were negative, respiratory panel was not tested, procalcitonin was 0.11, CAT scan of the chest showed bilateral patchy groundglass opacities, sent for COVID-19, result positive. 3.  Acute Hypoxic Respiratory Failure - continue oxygen support, MDIs, Flutter therapy  4. LFTs, normalizing. Monitor and treat accordingly. Avoid Hepatotoxic meds.       Electronically signed by Peri Jean MD on 12/29/2020 at 11:43 AM

## 2020-12-29 NOTE — CARE COORDINATION
CM NOTE: Per QFR---covid positive. Limited code. Continues on O2 60L & decadron. 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 Son was recent covid discharge 3 days ago. Pall med spoke with daughter---plan remains home at discharge pending progress. Unable to consider [facility] or home with current oxygen needs. Unable to consider LTAC d/t payor. Will continue to follow.

## 2020-12-29 NOTE — PROGRESS NOTES
Pretty Dorman M.D.,[facility]  Brooks Mcfarland D.O., F.A.C.O.I., Tony Acuna M.D. Hannah Ang M.D., Romina Benavides M.D. Shyann Chin D.O. Daily Pulmonary Progress Note    Patient:  Thiago Schmidt 80 y.o. female MRN: [id number]     Date of Service: 12/29/2020      Synopsis     We are following patient for respiratory failure with hypoxia, severe COVID-19 pneumonitis    \"CC\" shortness of breath    Code status: limited code status,  meds only      Subjective      Patient was seen through window, case discussed with staff in effort to conserve PPE and limit exposure to covid 19. Complaining of shortness of breath at rest.  Still requiring nonrebreather 15 liters plus  heated high flow 60 L FIO2 100%. SPO2 90% at rest. uses NIV AVAPS volume 450  at HS and as needed during day for respiratory support. No significant change. Doing poorly overall. Review of Systems:  Constitutional: Denies fever, weight loss, night sweats, positive for fatigue  Skin: Denies pigmentation, dark lesions, and rashes   HEENT: Denies hearing loss, tinnitus, ear drainage, epistaxis, sore throat, and hoarseness. Cardiovascular: Denies palpitations, chest pain, and chest pressure.   Respiratory: Positive shortness of breath  Gastrointestinal: Denies nausea, vomiting, poor appetite, diarrhea, heartburn or reflux  Genitourinary: Denies dysuria, frequency, urgency or hematuria  Musculoskeletal: Denies myalgias, muscle weakness, and bone pain  Neurological: Denies dizziness, vertigo, headache, and focal weakness  Psychological: Denies anxiety and depression  Endocrine: Denies heat intolerance and cold intolerance  Hematopoietic/Lymphatic: Denies bleeding problems and blood transfusions    24-hour events:  none    Objective   Vitals: /72   Pulse 61   Temp 98 °F (36.7 °C) (Oral)   Resp 24   Ht 4' 11\" (1.499 m)   Wt 125 lb (56.7 kg)   SpO2 90%   BMI 25.25 kg/m²     I/O:    Intake/Output Summary (Last 24 hours) at 12/29/2020 1406  Last data filed at 12/29/2020 0408  Gross per 24 hour   Intake --   Output 650 ml   Net -650 ml       Vent Information  Skin Assessment: Clean, dry, & intact  Equipment Changed: (S) Humidification(new water bag; airvo remains on standby)  Vt Ordered: 450 mL  FiO2 : 100 %  SpO2: 90 %  SpO2/FiO2 ratio: 90  I Time/ I Time %: 1 s  Humidification Source: Heated wire  Humidification Temp: 34  Humidification Temp Measured: 34  Mask Type: Full face mask  Mask Size: Medium      CURRENT MEDS :  Scheduled Meds:   tamsulosin  0.4 mg Oral Daily    dexamethasone  6 mg Oral 2 times per day    amitriptyline  25 mg Oral Daily    amLODIPine  5 mg Oral Daily    metoprolol tartrate  25 mg Oral BID    perphenazine  4 mg Oral Daily    sodium chloride flush  10 mL Intravenous 2 times per day    enoxaparin  40 mg Subcutaneous Daily    Vitamin D  2,000 Units Oral Daily    ascorbic acid  500 mg Oral Daily    zinc sulfate  50 mg Oral Daily    pantoprazole  40 mg Oral QAM AC       Physical Exam:  Due to the current efforts to prevent transmission of COVID-19 and also the need to preserve PPE for other caregivers, a face-to-face encounter with the patient was not performed. That being said, all relevant records and diagnostic tests were reviewed, including laboratory results and imaging. Please reference any relevant documentation elsewhere. Care will be coordinated with the primary service. .    Pertinent/ New Labs and Imaging Studies     Imaging Personally Reviewed:  Chest x-ray 12/28     Reason for exam:->resp failure   FINDINGS:   There are patchy infiltrate seen within the right perihilar and right lower   lobe. Epstein [place] is an infiltrate seen within the left lung base.       Impression   1. Multifocal right perihilar and right lower lobe infiltrates. 2. Left lung base infiltrate.            CT chest without contrast 12/19/2020  Patchy bilateral alveolar and interstitial infiltrates most hypoxia  2. Severe COVID-19 pneumonitis  3. Elevated inflammatory markers  4. Lymphopenia  5. Sinus tachycardia    Plan:   1. Oxygen therapy -heated high flow nasal cannula to maintain saturation greater than 90% currently on 15 L NRB plus AirVo 60 L FiO2 100%  2. AVAPS as needed for respiratory support target volume 400-450  3. COVID-19 treatment-continue remdesivir to complete 5 total days. Tocilizumab given x1 dose 12/23/2020. Decadron 6 mg p.o. twice daily, continue vitamin protocol  4. Trend inflammatory markers. Procalcitonin 0.11  5. Chest x-ray 12/28  6. GI prophylaxis  7. Incentive spirometer  8. Lovenox 40 mg subcu daily for DVT prophylaxis. D-dimer increased to 1239  9. Consult palliative medicine      Code Status: Limited meds only    This plan of care was reviewed in collaboration with Dr. Alex Machuca  Electronically signed by EMETERIO Cagle CNP on 12/29/2020 at 2:06 PM       I personally saw, examined, and cared for the patient. Labs, medications, radiographs reviewed. I agree with history exam and plans detailed in NP note. Saad Sharma M.D.    Pulmonary/Critical Care Medicine

## 2020-12-30 NOTE — PROGRESS NOTES
[facility] Progress Note    Admitting Date and Time: 12/19/2020 11:18 AM  Admit Dx: Acute on chronic respiratory failure with hypoxia (HCC) [J96.21]    Subjective:  Patient is being followed for Acute on chronic respiratory failure with hypoxia ([address] [J96.21]     Pt wants to go home. Informed needs to be on much less oxygen. Discussed with Case Management. Her insurance does not cover [facility], LTAC or SNF. Can only go Home on discharge. Need to reduce oxygen needs which are high at this time. O2 sat:  91% on 60L, FiO2 100%     Fever:  98.4    Per RN: no new concerns/needs    ROS: denies cp, n/v, HA unless stated above.       tamsulosin  0.4 mg Oral Daily    dexamethasone  6 mg Oral 2 times per day    amitriptyline  25 mg Oral Daily    amLODIPine  5 mg Oral Daily    metoprolol tartrate  25 mg Oral BID    perphenazine  4 mg Oral Daily    sodium chloride flush  10 mL Intravenous 2 times per day    enoxaparin  40 mg Subcutaneous Daily    Vitamin D  2,000 Units Oral Daily    ascorbic acid  500 mg Oral Daily    zinc sulfate  50 mg Oral Daily    pantoprazole  40 mg Oral QAM AC         morphine 20MG/ML, 2.5 mg, Q6H PRN      albuterol sulfate HFA, 2 puff, Q6H PRN      sodium chloride, 30 mL, PRN      sodium chloride flush, 10 mL, PRN      promethazine, 12.5 mg, Q6H PRN    Or      ondansetron, 4 mg, Q6H PRN      polyethylene glycol, 17 g, Daily PRN      acetaminophen, 650 mg, Q6H PRN    Or      acetaminophen, 650 mg, Q6H PRN      guaiFENesin-dextromethorphan, 5 mL, Q4H PRN    Objective:    /66   Pulse 78   Temp 98.4 °F (36.9 °C) (Oral)   Resp 23   Ht 4' 11\" (1.499 m)   Wt 125 lb (56.7 kg)   SpO2 91%   BMI 25.25 kg/m²     General Appearance: alert and oriented to person, place and time and in no acute distress  Skin: warm and dry  Head: normocephalic and atraumatic  Eyes: pupils equal, round, and reactive to light, extraocular eye movements intact, conjunctivae

## 2020-12-30 NOTE — PROGRESS NOTES
Physical Therapy    Facility/Department: [facility] MED SURG  Initial Assessment    NAME: Shay Lora  : 1929  MRN: [id number]    Date of Service: 2020      Attempted to see pt for PT evaluation, hold per nursing.    Radha BASS [id number]

## 2020-12-30 NOTE — PROGRESS NOTES
Oriana Morgan M.D.,[facility]  Nick Sy D.O., F.A.C.O.I., Eric Mccauley M.D. Millie Baer M.D., Ephraim Haile M.D. Destinee Kenny D.O. Daily Pulmonary Progress Note    Patient:  Darryl Schmidt 80 y.o. female MRN: [id number]     Date of Service: 12/30/2020      Synopsis     We are following patient for respiratory failure with hypoxia, severe COVID-19 pneumonitis    \"CC\" shortness of breath    Code status: limited code status,  meds only      Subjective      Patient was seen through window, case discussed with staff in effort to conserve PPE and limit exposure to covid 19. Complaining of shortness of breath at rest.  Still requiring nonrebreather 15 liters plus  heated high flow 60 L FIO2 100%. SPO2 90% at rest. uses NIV AVAPS volume 450  at HS and as needed during day for respiratory support. No significant change. Doing poorly overall. Review of Systems:  Constitutional: Denies fever, weight loss, night sweats, positive for fatigue  Skin: Denies pigmentation, dark lesions, and rashes   HEENT: Denies hearing loss, tinnitus, ear drainage, epistaxis, sore throat, and hoarseness. Cardiovascular: Denies palpitations, chest pain, and chest pressure.   Respiratory: Positive shortness of breath  Gastrointestinal: Denies nausea, vomiting, poor appetite, diarrhea, heartburn or reflux  Genitourinary: Denies dysuria, frequency, urgency or hematuria  Musculoskeletal: Denies myalgias, muscle weakness, and bone pain  Neurological: Denies dizziness, vertigo, headache, and focal weakness  Psychological: Denies anxiety and depression  Endocrine: Denies heat intolerance and cold intolerance  Hematopoietic/Lymphatic: Denies bleeding problems and blood transfusions    24-hour events:  none    Objective   Vitals: /66   Pulse 78   Temp 98.4 °F (36.9 °C) (Oral)   Resp 23   Ht 4' 11\" (1.499 m)   Wt 125 lb (56.7 kg)   SpO2 90%   BMI 25.25 kg/m²     I/O:    Intake/Output Summary (Last 24 hours) at 12/30/2020 1554  Last data filed at 12/30/2020 0507  Gross per 24 hour   Intake --   Output 750 ml   Net -750 ml       Vent Information  Skin Assessment: Clean, dry, & intact  Equipment Changed: (S) Humidification(new water bag; airvo remains on standby)  Vt Ordered: 450 mL  FiO2 : 100 %  SpO2: 90 %  SpO2/FiO2 ratio: 90  I Time/ I Time %: 1 s  Humidification Source: Heated wire  Humidification Temp: 34  Humidification Temp Measured: 34  Mask Type: Full face mask  Mask Size: Medium      CURRENT MEDS :  Scheduled Meds:   tamsulosin  0.4 mg Oral Daily    dexamethasone  6 mg Oral 2 times per day    amitriptyline  25 mg Oral Daily    amLODIPine  5 mg Oral Daily    metoprolol tartrate  25 mg Oral BID    perphenazine  4 mg Oral Daily    sodium chloride flush  10 mL Intravenous 2 times per day    enoxaparin  40 mg Subcutaneous Daily    Vitamin D  2,000 Units Oral Daily    ascorbic acid  500 mg Oral Daily    zinc sulfate  50 mg Oral Daily    pantoprazole  40 mg Oral QAM AC       Physical Exam:  Due to the current efforts to prevent transmission of COVID-19 and also the need to preserve PPE for other caregivers, a face-to-face encounter with the patient was not performed. That being said, all relevant records and diagnostic tests were reviewed, including laboratory results and imaging. Please reference any relevant documentation elsewhere. Care will be coordinated with the primary service. .    Pertinent/ New Labs and Imaging Studies     Imaging Personally Reviewed:  Chest x-ray 12/28     Reason for exam:->resp failure   FINDINGS:   There are patchy infiltrate seen within the right perihilar and right lower   lobe. Uriah Crown is an infiltrate seen within the left lung base.       Impression   1. Multifocal right perihilar and right lower lobe infiltrates. 2. Left lung base infiltrate.            CT chest without contrast 12/19/2020  Patchy bilateral alveolar and interstitial infiltrates most notably in the   right upper, middle and left lower lobes probably representing pneumonia. Other infectious/inflammatory processes such as COVID-19 are also considered. Coronary artery/atherosclerotic disease   FINDINGS:   Mediastinum:   Lungs/pleura:   Upper Abdomen:   Soft Tissues/Bones:         ECHO  2019        Summary   Left ventricular internal dimensions were normal in diastole and systole. No regional wall motion abnormalities seen. Normal left ventricular ejection fraction. There is doppler evidence of stage I diastolic dysfunction. Focal calcification mitral valve leaflets. Mild mitral annular calcification. The aortic valve appears mildly sclerotic. Labs:  Lab Results   Component Value Date    WBC 19.0 12/30/2020    HGB 14.2 12/30/2020    HCT 43.0 12/30/2020    MCV 90.7 12/30/2020    MCH 30.0 12/30/2020    MCHC 33.0 12/30/2020    RDW 13.2 12/30/2020     12/30/2020    MPV 9.2 12/30/2020     Lab Results   Component Value Date     12/30/2020    K 4.5 12/30/2020     12/30/2020    CO2 21 12/30/2020    BUN 35 12/30/2020    CREATININE 0.7 12/30/2020    LABALBU 3.3 12/30/2020    CALCIUM 9.3 12/30/2020    GFRAA >60 12/30/2020    LABGLOM >60 12/30/2020     Lab Results   Component Value Date    PROTIME 13.7 12/19/2020    INR 1.2 12/19/2020     No results for input(s): PROBNP in the last 72 hours. No results for input(s): PROCAL in the last 72 hours. This SmartLink has not been configured with any valid records. 12/23/2020  Ferritin 837    CRP 10.8    Micro:  No results for input(s): CULTRESP in the last 72 hours. No results for input(s): LABGRAM in the last 72 hours. No results for input(s): LEGUR in the last 72 hours. No results for input(s): STREPNEUMAGU in the last 72 hours. No results for input(s): LP1UAG in the last 72 hours. Strep pneumo antigen, Legionella urine antigen pending  Procalcitonin 0.11   Assessment:    1.  Acute respiratory failure with hypoxia  2. Severe COVID-19 pneumonitis  3. Elevated inflammatory markers  4. Lymphopenia  5. Sinus tachycardia    Plan:   1. Oxygen therapy -heated high flow nasal cannula to maintain saturation greater than 90% currently on 15 L NRB plus AirVo 60 L FiO2 100%  2. AVAPS as needed for respiratory support target volume 400-450  3. COVID-19 treatment-continue remdesivir to complete 5 total days. Tocilizumab given x1 dose 12/23/2020. Decadron 6 mg p.o. twice daily, continue vitamin protocol  4. Trend inflammatory markers. Procalcitonin 0.11  5. Chest x-ray 12/28  6. GI prophylaxis  7. Incentive spirometer  8. Lovenox increase to intermediate  0.5 mg/kg SQ BID dosing  for DVT prophylaxis. D-dimer increased to 3675. Check fibrinogen   9. Consult palliative medicine      Code Status: Limited meds only    This plan of care was reviewed in collaboration with Dr. Arnol Thakur  Electronically signed by EMETERIO Horvath CNP on 12/30/2020 at 3:54 PM     I personally saw, examined, and cared for the patient. Labs, medications, radiographs reviewed. I agree with history exam and plans detailed in NP note. Edie Rangel M.D.    Pulmonary/Critical Care Medicine

## 2020-12-30 NOTE — PROGRESS NOTES
Date: 12/30/2020    Time: 5:06 PM    Patient Placed On BIPAP/CPAP/ Non-Invasive Ventilation? Yes    If no must comment. Facial area red/color change? No           If YES are Blister/Lesion present? No   If yes must notify nursing staff  BIPAP/CPAP skin barrier?   Yes    Skin barrier type:mepilexlite       Comments:        Corey Maradiaga

## 2020-12-30 NOTE — PROGRESS NOTES
Palliative Care Department  [phone]  Palliative Care Progress Note  Provider Katya Dean,[address]  [id number]  Hospital Day: 12  Date of Initial Consult: 12/29/2020  Referring Provider: EMETERIO Hairston CNP  Palliative Medicine was consulted for assistance with: Goals of Care, Symptom Management    HPI:   Arley Whiteside is a 80 y.o. with a medical history of hypertension who was admitted on 12/19/2020 from home with a CHIEF COMPLAINT of shortness of breath, cough, chills. Patient positive for COVID-19. Admitted to telemetry for further work-up and management. Palliative medicine consulted for goals of care, symptom management. ASSESSMENT/PLAN:     Pertinent Hospital Diagnoses      Acute respiratory failure with hypoxia    COVID-19   Morphine concentrate 2.5mg PO q6h PRN for symptom management: shortness of breath/respiratory distress-0 doses in last 24 hours      Palliative Care Encounter / Counseling Regarding Goals of Care  Please see detailed goals of care discussion as below   At this time, Arlye Whiteside, Does have capacity for medical decision-making.   Capacity is time limited and situation/question specific   Outcome of goals of care meeting: Continue current management   Code status Limited -No intubation, no compressions, no defibrillation, okay for resuscitative medications only   Advanced Directives: no POA or living will in [facility]   Surrogate/Legal NOK:  Yrn Bunn, daughter/POA, [phone]      Spiritual assessment: no spiritual distress identified  Bereavement and grief: to be determined  Referrals to: none today  SUBJECTIVE:     Current medical issues leading to Palliative Medicine involvement include   Active Hospital Problems    Diagnosis Date Noted    Elevated LFTs [R79.89]     Acute respiratory distress [R06.03]     Hyperglycemia [R73.9]     Pneumonia due to COVID-19 virus [U07.1, J12.89]     Thrombocytopenia (HCC) [D69.6]     Acute respiratory failure with hypoxia ([facility] [address] [J96.01] 12/19/2020    COVID-19 [U07.1] 12/19/2020       Details of Conversation: Chart reviewed. Face-to-face encounter not performed due to COVID-19 isolation and conservation of PPE. Patient continues on heated high flow nasal cannula and NRB. Patient wishes to continue with current medical management. Morphine ordered for shortness of breath-0 doses in last 24 hours. Continue limited code-resuscitative medications only. Will continue to follow. OBJECTIVE:   Prognosis: unknown    Physical Exam:  /66   Pulse 78   Temp 98.4 °F (36.9 °C) (Oral)   Resp 23   Ht 4' 11\" (1.499 m)   Wt 125 lb (56.7 kg)   SpO2 90%   BMI 25.25 kg/m²    As per extensive chart review and bedside RN assessment:  Constitutional:  Awake, alert  Eyes: No scleral icterus, normal lids, no discharge  ENMT:  Normocephalic, atraumatic, mucosa moist, EOMI  Neck:  Trachea midline, no JVD  Lungs: Heated high flow nasal cannula, nonrebreather  Heart:  RRR  Abd:  Soft, non tender, non distended, bowel sounds present  Ext:  + edema, pulses present  Skin:  Warm and dry, no rashes on visible skin  Neuro:  Alert, oriented, able to carry on meaningful medical conversation    Objective data reviewed: labs, images, records, medication use, vitals and chart    Discussed patient and the plan of care with the other IDT members: Palliative Medicine IDT Team    Time/Communication  Greater than 50% of time spent, total 15 minutes in counseling and coordination of care at the bedside regarding goals of care, diagnosis and prognosis and see above. Thank you for allowing Palliative Medicine to participate in the care of [facility]. Note: This report was completed using computerArrowsight voiced recognition software. Every effort has been made to ensure accuracy; however, inadvertent computerized transcription errors may be present.

## 2020-12-30 NOTE — CARE COORDINATION
CM NOTE: CM met with PCP this am to explain transition of care. Updated that pt cannot go to [facility] on HF O2 & she does not have LTAC benefit through payor. Plan si to continue to attempt to wean O2. Pt alert & not ready for hospice at this time.

## 2020-12-30 NOTE — PROGRESS NOTES
Date: 12/29/2020    Time: 11:18 PM    Patient Placed On BIPAP/CPAP/ Non-Invasive Ventilation? Yes    If no must comment. Facial area red/color change? No           If YES are Blister/Lesion present? No   If yes must notify nursing staff  BIPAP/CPAP skin barrier?   Yes    Skin barrier type:mepilexlite       Comments:        Inocencia Cruz

## 2020-12-31 NOTE — PROGRESS NOTES
[facility] Progress Note    Admitting Date and Time: 12/19/2020 11:18 AM  Admit Dx: Acute on chronic respiratory failure with hypoxia (HCC) [J96.21]    Subjective:  Patient is being followed for Acute on chronic respiratory failure with hypoxia ([address] [J96.21]     Pt feels \"like cannot breath\". Nursing made aware. See orders    O2 sat:  89% on  60L     Fever:  98.6    Per RN: discussed with Nurse; discontinuing SL Morphine and changing to IV Morphine. See orders. ROS: denies cp, n/v, HA unless stated above.  SOB+     enoxaparin  0.5 mg/kg Subcutaneous BID    tamsulosin  0.4 mg Oral Daily    dexamethasone  6 mg Oral 2 times per day    amitriptyline  25 mg Oral Daily    amLODIPine  5 mg Oral Daily    metoprolol tartrate  25 mg Oral BID    perphenazine  4 mg Oral Daily    sodium chloride flush  10 mL Intravenous 2 times per day    Vitamin D  2,000 Units Oral Daily    ascorbic acid  500 mg Oral Daily    zinc sulfate  50 mg Oral Daily    pantoprazole  40 mg Oral QAM AC         morphine 20MG/ML, 2.5 mg, Q6H PRN      albuterol sulfate HFA, 2 puff, Q6H PRN      sodium chloride, 30 mL, PRN      sodium chloride flush, 10 mL, PRN      promethazine, 12.5 mg, Q6H PRN    Or      ondansetron, 4 mg, Q6H PRN      polyethylene glycol, 17 g, Daily PRN      acetaminophen, 650 mg, Q6H PRN    Or      acetaminophen, 650 mg, Q6H PRN      guaiFENesin-dextromethorphan, 5 mL, Q4H PRN    Objective:    /60   Pulse 132   Temp 98.6 °F (37 °C) (Axillary)   Resp 24   Ht 4' 11\" (1.499 m)   Wt 125 lb (56.7 kg)   SpO2 93%   BMI 25.25 kg/m²     General Appearance: alert and oriented to person, place and time and in no acute distress  Skin: warm and dry  Head: normocephalic and atraumatic  Eyes: pupils equal, round, and reactive to light, extraocular eye movements intact, conjunctivae normal  Neck: neck supple and non tender without mass   Pulmonary/Chest: crackles bilaterally- no wheezes, moderate respiratory distress  Cardiovascular: normal rate, normal S1 and S2 and no carotid bruits  Abdomen: soft, non-tender, non-distended, normal bowel sounds, no masses or organomegaly  Extremities: no cyanosis, no clubbing and no edema  Neurologic: no cranial nerve deficit and speech normal    Recent Labs     12/29/20  0347 12/30/20  1120    134   K 4.6 4.5    100   CO2 23 21*   BUN 34* 35*   CREATININE 0.7 0.7   GLUCOSE 147* 133*   CALCIUM 9.2 9.3       Recent Labs     12/29/20  0347 12/30/20  1120   WBC 17.1* 19.0*   RBC 4.49 4.74   HGB 13.3 14.2   HCT 41.3 43.0   MCV 92.0 90.7   MCH 29.6 30.0   MCHC 32.2 33.0   RDW 13.1 13.2    284   MPV 9.0 9.2     Radiology:   Xr Chest Portable    Result Date: 12/31/2020  EXAMINATION: ONE XRAY VIEW OF THE CHEST 12/31/2020 7:03 am COMPARISON: December 28, 2020, December 24, 2020 HISTORY: ORDERING SYSTEM PROVIDED HISTORY: resp failure TECHNOLOGIST PROVIDED HISTORY: Reason for exam:->resp failure FINDINGS: There are persistent airspace opacities at both lung bases, left greater than right. Increased interstitial markings throughout both lungs is noted as well. Heart size is unable to be accurately assessed on this single portable view of the chest, but appears to be stable. No evidence of a pneumothorax or sizable pleural effusion. Bones are diffusely osteopenic. Persistent patchy airspace opacities at both lung bases along with increased interstitial markings. Findings are nonspecific may be on the basis of multifocal pneumonia or potentially pulmonary edema. Airspace opacities are not significantly changed compared with most recent examinations. Assessment:    Active Problems:    Acute respiratory failure with hypoxia (HCC)    COVID-19    Pneumonia due to COVID-19 virus    Thrombocytopenia (HCC)    Hyperglycemia    Acute respiratory distress    Elevated LFTs  Resolved Problems:    * No resolved hospital problems. *      Plan:  1.    Acute hypoxic respiratory failure, most likely due to viral pneumonia, O2 sat was below 90% on room air, requiring 60L nasal cannula and oxygen saturation above 90%.  Treating the underlying process. IV Lasix ordered now and then Lasix 40 mg daily starting tomorrow. Morphine 1 mg IV q 4 hrs. 2.  Acute viral pneumonia, rapid influenza a and B were negative, respiratory panel was not tested, procalcitonin was 0.11, CAT scan of the chest showed bilateral patchy groundglass opacities, sent for COVID-19, result positive. 3.  Acute Hypoxic Respiratory Failure - continue oxygen support, MDIs, Flutter therapy  4.   LFTs, normalizing.  Monitor and treat accordingly.  Avoid Hepatotoxic meds.     Electronically signed by Mark Jeronimo MD on 12/31/2020 at 11:30 AM

## 2020-12-31 NOTE — PROGRESS NOTES
Physical Therapy  PT allison attempted. Held by RN due to resp status. Will re-attempt at later date.

## 2020-12-31 NOTE — PROGRESS NOTES
Palliative Care Department  [phone]  Palliative Care Progress Note  Provider Amie Ordonez PA-C    Liz Aguilera  [id number]  Hospital Day: 13  Date of Initial Consult: 12/29/2020  Referring Provider: EMETERIO Trent CNP  Palliative Medicine was consulted for assistance with: Goals of Care, Symptom Management    HPI:   Liz Aguilera is a 80 y.o. with a medical history of hypertension who was admitted on 12/19/2020 from home with a CHIEF COMPLAINT of shortness of breath, cough, chills. Patient positive for COVID-19. Admitted to telemetry for further work-up and management. Palliative medicine consulted for goals of care, symptom management. ASSESSMENT/PLAN:     Pertinent Hospital Diagnoses      Acute respiratory failure with hypoxia   o Pulmonary following, continued high oxygen needs  o diauresis per primary service     COVID-19  o S/p remdesivir, on steroids/vitamin     Shortness of breath  o Per primary service, SL morphine changed to IV today    Palliative Care Encounter / Counseling Regarding Goals of Care  Please see detailed goals of care discussion as below   At this time, Liz Aguilera, Does have capacity for medical decision-making.   Capacity is time limited and situation/question specific   Outcome of goals of care meeting: Continue current management   Code status Limited -No intubation, no compressions, no defibrillation, okay for resuscitative medications only   Advanced Directives: no POA or living will in [facility]   Surrogate/Legal NOK:  Valerie Richardson, daughter/POA, [phone]    Spiritual assessment: no spiritual distress identified  Bereavement and grief: to be determined  Referrals to: none today      SUBJECTIVE:   Chart reviewed  O2 needs increased today  Morphine changed to IV per primary service    OBJECTIVE:   According to institutional recommendations and guidelines, a face-to-face encounter was not performed due to the current efforts to prevent transmission of COVID-19 and the need to preserve PPE for other caregivers. Relevant records, nursing assessment, and diagnostic testing including laboratory results and imaging were reviewed. Please reference any relevant documentation elsewhere. Patient was observed through the window and observation as reported below. Care will be coordinated with the primary services and consultants. Prognosis: unknown    Physical Exam:  /60   Pulse 132   Temp 98.6 °F (37 °C) (Axillary)   Resp 24   Ht 4' 11\" (1.499 m)   Wt 125 lb (56.7 kg)   SpO2 (!) 89%   BMI 25.25 kg/m²    As per extensive chart review and bedside RN assessment:  Constitutional:  Awake, alert  Eyes: No scleral icterus, normal lids, no discharge  ENMT:  Normocephalic, atraumatic, mucosa moist, EOMI  Neck:  Trachea midline, no JVD  Lungs: Heated high flow nasal cannula, nonrebreather  Heart:  tachycardic  Abd:  Last BM 12/22  Ext:  + edema, pulses present  Skin:  Warm and dry, no rashes on visible skin  Neuro:  Alert, oriented, able to carry on meaningful medical conversation    Objective data reviewed: labs, images, records, medication use, vitals and chart    Discussed patient and the plan of care with the other IDT members: Palliative Medicine IDT Team    Time/Communication  Greater than 50% of time spent, total 15 minutes in counseling and coordination of care at the bedside regarding goals of care, diagnosis and prognosis and see above. Thank you for allowing Palliative Medicine to participate in the care of [facility] SHARANTrinity HealthJAREDRobe Todd PA-C  Palliative medicine    Note: This report was completed using computerBactest voiced recognition software. Every effort has been made to ensure accuracy; however, inadvertent computerized transcription errors may be present.

## 2020-12-31 NOTE — PROGRESS NOTES
Trevor Mccollum M.D.,[facility]  Lonny Kapoor D.O., F.A.C.O.I., Marco Morgan M.D. Monique Britt M.D., Carolina Abbasi M.D. Siobhan Peacock D.O. Daily Pulmonary Progress Note    Patient:  Angella Schmidt 80 y.o. female MRN: [id number]     Date of Service: 12/31/2020      Synopsis     We are following patient for respiratory failure with hypoxia, severe COVID-19 pneumonitis    \"CC\" shortness of breath    Code status: limited code status,  meds only      Subjective      Patient was seen through window, case discussed with staff in effort to conserve PPE and limit exposure to covid 19. Complaining of shortness of breath at rest.  Still requiring nonrebreather 15 liters plus  heated high flow 60 L FIO2 100%. SPO2 90% at rest. uses NIV AVAPS volume 450  at HS and as needed during day for respiratory support. No significant change. Doing poorly overall. She is currently on cardiac meds only. palliative care following. Review of Systems:  Constitutional: Denies fever, weight loss, night sweats, positive for fatigue  Skin: Denies pigmentation, dark lesions, and rashes   HEENT: Denies hearing loss, tinnitus, ear drainage, epistaxis, sore throat, and hoarseness. Cardiovascular: Denies palpitations, chest pain, and chest pressure.   Respiratory: Positive shortness of breath  Gastrointestinal: Denies nausea, vomiting, poor appetite, diarrhea, heartburn or reflux  Genitourinary: Denies dysuria, frequency, urgency or hematuria  Musculoskeletal: Denies myalgias, muscle weakness, and bone pain  Neurological: Denies dizziness, vertigo, headache, and focal weakness  Psychological: Denies anxiety and depression  Endocrine: Denies heat intolerance and cold intolerance  Hematopoietic/Lymphatic: Denies bleeding problems and blood transfusions    24-hour events:  none    Objective   Vitals: /60   Pulse 132   Temp 98.6 °F (37 °C) (Axillary)   Resp 24   Ht 4' 11\" (1.499 m)   Wt 125 lb (56.7 kg)   SpO2 93%   BMI 25.25 kg/m²     I/O:    Intake/Output Summary (Last 24 hours) at 12/31/2020 1046  Last data filed at 12/31/2020 0524  Gross per 24 hour   Intake --   Output 600 ml   Net -600 ml       Vent Information  Skin Assessment: Clean, dry, & intact  Equipment Changed: (S) Humidification(new water bag; airvo remains on standby)  Vt Ordered: 450 mL  FiO2 : 100 %  SpO2: 93 %  SpO2/FiO2 ratio: 87  I Time/ I Time %: 1 s  Humidification Source: Heated wire  Humidification Temp: 34  Humidification Temp Measured: 34  Mask Type: Full face mask  Mask Size: Small      CURRENT MEDS :  Scheduled Meds:   enoxaparin  0.5 mg/kg Subcutaneous BID    tamsulosin  0.4 mg Oral Daily    dexamethasone  6 mg Oral 2 times per day    amitriptyline  25 mg Oral Daily    amLODIPine  5 mg Oral Daily    metoprolol tartrate  25 mg Oral BID    perphenazine  4 mg Oral Daily    sodium chloride flush  10 mL Intravenous 2 times per day    Vitamin D  2,000 Units Oral Daily    ascorbic acid  500 mg Oral Daily    zinc sulfate  50 mg Oral Daily    pantoprazole  40 mg Oral QAM AC       Physical Exam:  Due to the current efforts to prevent transmission of COVID-19 and also the need to preserve PPE for other caregivers, a face-to-face encounter with the patient was not performed. That being said, all relevant records and diagnostic tests were reviewed, including laboratory results and imaging. Please reference any relevant documentation elsewhere. Care will be coordinated with the primary service. .    Pertinent/ New Labs and Imaging Studies     Imaging Personally Reviewed:  Chest x-ray 12/28     Reason for exam:->resp failure   FINDINGS:   There are patchy infiltrate seen within the right perihilar and right lower   lobe. Michael Motts is an infiltrate seen within the left lung base.       Impression   1. Multifocal right perihilar and right lower lobe infiltrates. 2. Left lung base infiltrate.            CT chest without contrast 12/19/2020  Patchy bilateral alveolar and interstitial infiltrates most notably in the   right upper, middle and left lower lobes probably representing pneumonia. Other infectious/inflammatory processes such as COVID-19 are also considered. Coronary artery/atherosclerotic disease   FINDINGS:   Mediastinum:   Lungs/pleura:   Upper Abdomen:   Soft Tissues/Bones:         ECHO  2019        Summary   Left ventricular internal dimensions were normal in diastole and systole. No regional wall motion abnormalities seen. Normal left ventricular ejection fraction. There is doppler evidence of stage I diastolic dysfunction. Focal calcification mitral valve leaflets. Mild mitral annular calcification. The aortic valve appears mildly sclerotic. Labs:  Lab Results   Component Value Date    WBC 19.0 12/30/2020    HGB 14.2 12/30/2020    HCT 43.0 12/30/2020    MCV 90.7 12/30/2020    MCH 30.0 12/30/2020    MCHC 33.0 12/30/2020    RDW 13.2 12/30/2020     12/30/2020    MPV 9.2 12/30/2020     Lab Results   Component Value Date     12/30/2020    K 4.5 12/30/2020     12/30/2020    CO2 21 12/30/2020    BUN 35 12/30/2020    CREATININE 0.7 12/30/2020    LABALBU 3.3 12/30/2020    CALCIUM 9.3 12/30/2020    GFRAA >60 12/30/2020    LABGLOM >60 12/30/2020     Lab Results   Component Value Date    PROTIME 13.7 12/19/2020    INR 1.2 12/19/2020     No results for input(s): PROBNP in the last 72 hours. No results for input(s): PROCAL in the last 72 hours. This SmartLink has not been configured with any valid records. 12/23/2020  Ferritin 837    CRP 10.8    Micro:  Strep pneumo antigen, Legionella urine antigen pending  Procalcitonin 0.11   Assessment:    1. Acute respiratory failure with hypoxia  2. Severe COVID-19 pneumonitis  3. Elevated inflammatory markers  4. Lymphopenia  5. Sinus tachycardia    Plan:   1.  Oxygen therapy -heated high flow nasal cannula to maintain saturation greater than 90% currently on 15 L NRB plus AirVo 60 L FiO2 100%  2. AVAPS as needed for respiratory support target volume 400-450  3. COVID-19 treatment- remdesivir completed 5 total days. Tocilizumab given x1 dose 12/23/2020. Decadron 6 mg p.o. twice daily, continue vitamin protocol  4. Trend inflammatory markers. Procalcitonin 0.11  5. Chest x-ray 12/28  6. GI prophylaxis  7. Incentive spirometer  8. Lovenox increase to intermediate  0.5 mg/kg SQ BID dosing  for DVT prophylaxis. D-dimer increased to 3675. Check fibrinogen   9. Consult palliative medicine      Code Status: Limited meds only    This plan of care was reviewed in collaboration with Dr. Dk Francis  Electronically signed by EMETERIO Kern CNP on 12/31/2020 at 10:46 AM        I personally saw, examined, and cared for the patient. Labs, medications, radiographs reviewed. I agree with history exam and plans detailed in NP note. [place] M.D.    Pulmonary/Critical Care Medicine

## 2020-12-31 NOTE — PLAN OF CARE
Problem: Inadequate oral food/beverage intake (NI-2.1)  Goal: Food and/or Nutrient Delivery  Continue Diet. Will Modify Current ONS to Ensure High Moncho ONS BID and Magic Cup Frozen ONS BID. If intake remains minimal x next ~1-2 days, would then highly recommend to consider EN support. Please consult if EN needed. Description: Individualized approach for food/nutrient provision.   Outcome: Met This Shift

## 2020-12-31 NOTE — PROGRESS NOTES
Occupational Therapy  Date:12/31/2020  Patient Name: Edilberto Logan  Room: 9034/2814-J     Occupational Therapy (OT) order received and OT evaluation attempted this date; OT evaluation held, per nursing. OT evaluation to be re-attempted at later date, as able/appropriate. Arcelia Melgoza, OTR/L  License Number: [id number]

## 2020-12-31 NOTE — PROGRESS NOTES
Comprehensive Nutrition Assessment    Type and Reason for Visit:  Reassess    Nutrition Recommendations/Plan: Continue Diet. Will Modify Current ONS to Ensure High Moncho ONS BID and Magic Cup Frozen ONS BID. If intake remains minimal x next ~1-2 days, would then highly recommend to consider EN support. Please consult if EN needed. If Needed,  TF Recommendations:  Standard w/ Fiber (Jevity 1.2) @ 45 ml/hr (Goal) Continuous x 24 hrs/d to provide 1080 ml TV, 1296 kcals, 60 gm Pro, 872 ml water. 100 ml flush q 6 hrs to provide 1272 ml total water (TF+Flush). Nutrition Assessment:  Pt declining from a nutritional stand-point AEB pt w/ continued poor intake since adm 2/2 poor appetite w/ SOB (desats easily, now on BiPAP) 2/2 COVID19+ per d/w RN and EMR review. Pt remains at risk d/t further decline in resp. function today leading to minimal intake of meals. Will Modify Current ONS however highly recommend to consider EN soon. Malnutrition Assessment:  Malnutrition Status: At risk for malnutrition (Comment)    Context:  Acute Illness     Findings of the 6 clinical characteristics of malnutrition:  Energy Intake:  7 - 50% or less of estimated energy requirements for 5 or more days  Weight Loss:  No significant weight loss     Body Fat Loss:  Unable to assess     Muscle Mass Loss:  Unable to assess    Fluid Accumulation:  No significant fluid accumulation     Strength:  Not Performed    Estimated Daily Nutrient Needs:  Energy (kcal):  4660-9121(MSJ x 1.3 SF per CBW); Weight Used for Energy Requirements:  Current     Protein (g):  60-70(1.3-1.5 gm/kg per IBW of 98#);  Weight Used for Protein Requirements:  Ideal        Fluid (ml/day):  3399-4196; Method Used for Fluid Requirements:  1 ml/kcal      Nutrition Related Findings:  A&O, dentition WNL, Abd/BS WDL, Trace BLE, -I/O's, BiPAP      Wounds:  None       Current Nutrition Therapies:    DIET LOW SODIUM 2 GM;  Dietary Nutrition Supplements: Clear Liquid Oral Supplement  Dietary Nutrition Supplements: Frozen Oral Supplement    Anthropometric Measures:  · Height: 4' 11\" (149.9 cm)  · Current Body Weight: 125 lb (56.7 kg)(unknown method 12/23)   · Admission Body Weight: 125 lb (56.7 kg)(12/19 method not documented)    · Usual Body Weight: 129 lb (58.5 kg)(per OV EMR x 1 yr ago)     · Ideal Body Weight: 95 lbs; % Ideal Body Weight 131.6 %   · BMI: 25.2  · Adjusted Body Weight:  ;     · Adjusted BMI:      · BMI Categories: Overweight (BMI 25.0-29. 9)       Nutrition Diagnosis:   · Inadequate oral intake related to impaired respiratory function(2/2 COVID19+) as evidenced by intake 0-25%, poor intake prior to admission      Nutrition Interventions:   Food and/or Nutrient Delivery:  Continue Current Diet, Modify Oral Nutrition Supplement(Continue Diet. Will Modify Current ONS to Ensure High Moncho ONS BID and Magic Cup Frozen ONS BID. If intake remains minimal x next ~1-2 days, would then highly recommend to consider EN support. Please consult if EN needed.)  Nutrition Education/Counseling:  No recommendation at this time   Coordination of Nutrition Care:  Continue to monitor while inpatient    Goals:  PO intake >50% of meals/ONS. Nutrition Monitoring and Evaluation:   Behavioral-Environmental Outcomes:  None Identified   Food/Nutrient Intake Outcomes:  Diet Advancement/Tolerance, Food and Nutrient Intake, Supplement Intake  Physical Signs/Symptoms Outcomes:  Biochemical Data, GI Status, Fluid Status or Edema, Nutrition Focused Physical Findings, Skin, Weight     Discharge Planning:     Too soon to determine     Electronically signed by Aminata Cohen RD, LD on 12/31/20 at 3:49 PM EST    Contact: ext [phone]

## 2020-12-31 NOTE — PROGRESS NOTES
Switched patient from Bipap to heated high flow, during assessment patient stated \"this isn't vincent work, I cant get enough air\". Patient satting 85-90% on heated high flow 60L 100%, plus 15L NRB mask. Patient placed back on Bipap. No further complaints of respiratory distress. satting 94% on bipap.

## 2021-01-01 ENCOUNTER — APPOINTMENT (OUTPATIENT)
Dept: GENERAL RADIOLOGY | Age: 86
DRG: 177 | End: 2021-01-01
Payer: MEDICARE

## 2021-01-01 VITALS
OXYGEN SATURATION: 82 % | WEIGHT: 125 LBS | DIASTOLIC BLOOD PRESSURE: 70 MMHG | SYSTOLIC BLOOD PRESSURE: 102 MMHG | BODY MASS INDEX: 25.2 KG/M2 | HEIGHT: 59 IN | TEMPERATURE: 97.9 F | HEART RATE: 124 BPM | RESPIRATION RATE: 31 BRPM

## 2021-01-01 LAB
ALBUMIN SERPL-MCNC: 3.2 G/DL (ref 3.5–5.2)
ALBUMIN SERPL-MCNC: 3.4 G/DL (ref 3.5–5.2)
ALP BLD-CCNC: 102 U/L (ref 35–104)
ALP BLD-CCNC: 93 U/L (ref 35–104)
ALT SERPL-CCNC: 17 U/L (ref 0–32)
ALT SERPL-CCNC: 19 U/L (ref 0–32)
ANION GAP SERPL CALCULATED.3IONS-SCNC: 10 MMOL/L (ref 7–16)
ANION GAP SERPL CALCULATED.3IONS-SCNC: 13 MMOL/L (ref 7–16)
AST SERPL-CCNC: 27 U/L (ref 0–31)
AST SERPL-CCNC: 28 U/L (ref 0–31)
BASOPHILS ABSOLUTE: 0.02 E9/L (ref 0–0.2)
BASOPHILS ABSOLUTE: 0.03 E9/L (ref 0–0.2)
BASOPHILS RELATIVE PERCENT: 0.1 % (ref 0–2)
BASOPHILS RELATIVE PERCENT: 0.2 % (ref 0–2)
BILIRUB SERPL-MCNC: 0.8 MG/DL (ref 0–1.2)
BILIRUB SERPL-MCNC: 1 MG/DL (ref 0–1.2)
BUN BLDV-MCNC: 39 MG/DL (ref 8–23)
BUN BLDV-MCNC: 41 MG/DL (ref 8–23)
CALCIUM SERPL-MCNC: 9.2 MG/DL (ref 8.6–10.2)
CALCIUM SERPL-MCNC: 9.7 MG/DL (ref 8.6–10.2)
CHLORIDE BLD-SCNC: 96 MMOL/L (ref 98–107)
CHLORIDE BLD-SCNC: 98 MMOL/L (ref 98–107)
CO2: 24 MMOL/L (ref 22–29)
CO2: 25 MMOL/L (ref 22–29)
CREAT SERPL-MCNC: 0.6 MG/DL (ref 0.5–1)
CREAT SERPL-MCNC: 0.6 MG/DL (ref 0.5–1)
D DIMER: 2785 NG/ML DDU
D DIMER: 3421 NG/ML DDU
EOSINOPHILS ABSOLUTE: 0 E9/L (ref 0.05–0.5)
EOSINOPHILS ABSOLUTE: 0 E9/L (ref 0.05–0.5)
EOSINOPHILS RELATIVE PERCENT: 0 % (ref 0–6)
EOSINOPHILS RELATIVE PERCENT: 0 % (ref 0–6)
GFR AFRICAN AMERICAN: >60
GFR AFRICAN AMERICAN: >60
GFR NON-AFRICAN AMERICAN: >60 ML/MIN/1.73
GFR NON-AFRICAN AMERICAN: >60 ML/MIN/1.73
GLUCOSE BLD-MCNC: 119 MG/DL (ref 74–99)
GLUCOSE BLD-MCNC: 143 MG/DL (ref 74–99)
HCT VFR BLD CALC: 39.3 % (ref 34–48)
HCT VFR BLD CALC: 44.7 % (ref 34–48)
HEMOGLOBIN: 13.1 G/DL (ref 11.5–15.5)
HEMOGLOBIN: 14.7 G/DL (ref 11.5–15.5)
IMMATURE GRANULOCYTES #: 0.13 E9/L
IMMATURE GRANULOCYTES #: 0.16 E9/L
IMMATURE GRANULOCYTES %: 0.8 % (ref 0–5)
IMMATURE GRANULOCYTES %: 0.9 % (ref 0–5)
LYMPHOCYTES ABSOLUTE: 0.43 E9/L (ref 1.5–4)
LYMPHOCYTES ABSOLUTE: 0.51 E9/L (ref 1.5–4)
LYMPHOCYTES RELATIVE PERCENT: 2.4 % (ref 20–42)
LYMPHOCYTES RELATIVE PERCENT: 3.1 % (ref 20–42)
MCH RBC QN AUTO: 29.4 PG (ref 26–35)
MCH RBC QN AUTO: 29.9 PG (ref 26–35)
MCHC RBC AUTO-ENTMCNC: 32.9 % (ref 32–34.5)
MCHC RBC AUTO-ENTMCNC: 33.3 % (ref 32–34.5)
MCV RBC AUTO: 89.4 FL (ref 80–99.9)
MCV RBC AUTO: 89.7 FL (ref 80–99.9)
MONOCYTES ABSOLUTE: 0.53 E9/L (ref 0.1–0.95)
MONOCYTES ABSOLUTE: 0.59 E9/L (ref 0.1–0.95)
MONOCYTES RELATIVE PERCENT: 3 % (ref 2–12)
MONOCYTES RELATIVE PERCENT: 3.5 % (ref 2–12)
NEUTROPHILS ABSOLUTE: 15.42 E9/L (ref 1.8–7.3)
NEUTROPHILS ABSOLUTE: 16.66 E9/L (ref 1.8–7.3)
NEUTROPHILS RELATIVE PERCENT: 92.4 % (ref 43–80)
NEUTROPHILS RELATIVE PERCENT: 93.6 % (ref 43–80)
OVALOCYTES: ABNORMAL
PDW BLD-RTO: 13.4 FL (ref 11.5–15)
PDW BLD-RTO: 13.4 FL (ref 11.5–15)
PLATELET # BLD: 168 E9/L (ref 130–450)
PLATELET # BLD: 219 E9/L (ref 130–450)
PMV BLD AUTO: 9.8 FL (ref 7–12)
PMV BLD AUTO: 9.9 FL (ref 7–12)
POIKILOCYTES: ABNORMAL
POTASSIUM REFLEX MAGNESIUM: 3.9 MMOL/L (ref 3.5–5)
POTASSIUM REFLEX MAGNESIUM: 4.3 MMOL/L (ref 3.5–5)
RBC # BLD: 4.38 E12/L (ref 3.5–5.5)
RBC # BLD: 5 E12/L (ref 3.5–5.5)
RBC # BLD: NORMAL 10*6/UL
SODIUM BLD-SCNC: 132 MMOL/L (ref 132–146)
SODIUM BLD-SCNC: 134 MMOL/L (ref 132–146)
TOTAL PROTEIN: 5.9 G/DL (ref 6.4–8.3)
TOTAL PROTEIN: 6.5 G/DL (ref 6.4–8.3)
WBC # BLD: 16.7 E9/L (ref 4.5–11.5)
WBC # BLD: 17.8 E9/L (ref 4.5–11.5)

## 2021-01-01 PROCEDURE — 2580000003 HC RX 258: Performed by: INTERNAL MEDICINE

## 2021-01-01 PROCEDURE — 6370000000 HC RX 637 (ALT 250 FOR IP): Performed by: FAMILY MEDICINE

## 2021-01-01 PROCEDURE — 85378 FIBRIN DEGRADE SEMIQUANT: CPT

## 2021-01-01 PROCEDURE — 94660 CPAP INITIATION&MGMT: CPT

## 2021-01-01 PROCEDURE — 6370000000 HC RX 637 (ALT 250 FOR IP): Performed by: INTERNAL MEDICINE

## 2021-01-01 PROCEDURE — 99223 1ST HOSP IP/OBS HIGH 75: CPT | Performed by: FAMILY MEDICINE

## 2021-01-01 PROCEDURE — 36415 COLL VENOUS BLD VENIPUNCTURE: CPT

## 2021-01-01 PROCEDURE — 71045 X-RAY EXAM CHEST 1 VIEW: CPT

## 2021-01-01 PROCEDURE — 80053 COMPREHEN METABOLIC PANEL: CPT

## 2021-01-01 PROCEDURE — 6360000002 HC RX W HCPCS: Performed by: INTERNAL MEDICINE

## 2021-01-01 PROCEDURE — 6360000002 HC RX W HCPCS: Performed by: NURSE PRACTITIONER

## 2021-01-01 PROCEDURE — 85025 COMPLETE CBC W/AUTO DIFF WBC: CPT

## 2021-01-01 PROCEDURE — 2500000003 HC RX 250 WO HCPCS: Performed by: FAMILY MEDICINE

## 2021-01-01 PROCEDURE — 2060000000 HC ICU INTERMEDIATE R&B

## 2021-01-01 PROCEDURE — 2700000000 HC OXYGEN THERAPY PER DAY

## 2021-01-01 PROCEDURE — 99233 SBSQ HOSP IP/OBS HIGH 50: CPT | Performed by: FAMILY MEDICINE

## 2021-01-01 PROCEDURE — 2580000003 HC RX 258: Performed by: FAMILY MEDICINE

## 2021-01-01 PROCEDURE — 99232 SBSQ HOSP IP/OBS MODERATE 35: CPT | Performed by: INTERNAL MEDICINE

## 2021-01-01 PROCEDURE — 99231 SBSQ HOSP IP/OBS SF/LOW 25: CPT | Performed by: PHYSICIAN ASSISTANT

## 2021-01-01 RX ORDER — 0.9 % SODIUM CHLORIDE 0.9 %
500 INTRAVENOUS SOLUTION INTRAVENOUS ONCE
Status: COMPLETED | OUTPATIENT
Start: 2021-01-01 | End: 2021-01-01

## 2021-01-01 RX ORDER — METOPROLOL TARTRATE 5 MG/5ML
2.5 INJECTION INTRAVENOUS ONCE
Status: COMPLETED | OUTPATIENT
Start: 2021-01-01 | End: 2021-01-01

## 2021-01-01 RX ORDER — MORPHINE SULFATE 20 MG/ML
2.5 SOLUTION ORAL EVERY 4 HOURS PRN
Status: DISCONTINUED | OUTPATIENT
Start: 2021-01-01 | End: 2021-01-01

## 2021-01-01 RX ORDER — MORPHINE SULFATE 20 MG/ML
5 SOLUTION ORAL EVERY 4 HOURS PRN
Status: DISCONTINUED | OUTPATIENT
Start: 2021-01-01 | End: 2021-01-01 | Stop reason: HOSPADM

## 2021-01-01 RX ORDER — LORAZEPAM 2 MG/ML
1 INJECTION INTRAMUSCULAR EVERY 6 HOURS PRN
Status: DISCONTINUED | OUTPATIENT
Start: 2021-01-01 | End: 2021-01-01 | Stop reason: HOSPADM

## 2021-01-01 RX ORDER — METOPROLOL TARTRATE 50 MG/1
50 TABLET, FILM COATED ORAL 2 TIMES DAILY
Status: DISCONTINUED | OUTPATIENT
Start: 2021-01-01 | End: 2021-01-01 | Stop reason: HOSPADM

## 2021-01-01 RX ORDER — MORPHINE SULFATE 10 MG/.5ML
2.5 SOLUTION ORAL
Status: DISCONTINUED | OUTPATIENT
Start: 2021-01-01 | End: 2021-01-01

## 2021-01-01 RX ORDER — MORPHINE SULFATE 2 MG/ML
2 INJECTION, SOLUTION INTRAMUSCULAR; INTRAVENOUS
Status: DISCONTINUED | OUTPATIENT
Start: 2021-01-01 | End: 2021-01-01 | Stop reason: HOSPADM

## 2021-01-01 RX ORDER — MORPHINE SULFATE 20 MG/ML
5 SOLUTION ORAL ONCE
Status: COMPLETED | OUTPATIENT
Start: 2021-01-01 | End: 2021-01-01

## 2021-01-01 RX ORDER — GLYCOPYRROLATE 0.2 MG/ML
0.2 INJECTION INTRAMUSCULAR; INTRAVENOUS EVERY 4 HOURS PRN
Status: DISCONTINUED | OUTPATIENT
Start: 2021-01-01 | End: 2021-01-01 | Stop reason: HOSPADM

## 2021-01-01 RX ADMIN — METOPROLOL TARTRATE 50 MG: 50 TABLET, FILM COATED ORAL at 20:57

## 2021-01-01 RX ADMIN — Medication 500 MG: at 10:11

## 2021-01-01 RX ADMIN — MORPHINE SULFATE 2 MG: 2 INJECTION, SOLUTION INTRAMUSCULAR; INTRAVENOUS at 20:57

## 2021-01-01 RX ADMIN — DEXAMETHASONE 6 MG: 4 TABLET ORAL at 00:49

## 2021-01-01 RX ADMIN — Medication 10 ML: at 20:45

## 2021-01-01 RX ADMIN — PERPHENAZINE 4 MG: 2 TABLET, FILM COATED ORAL at 08:34

## 2021-01-01 RX ADMIN — PANTOPRAZOLE SODIUM 40 MG: 40 TABLET, DELAYED RELEASE ORAL at 06:18

## 2021-01-01 RX ADMIN — METOPROLOL TARTRATE 25 MG: 25 TABLET, FILM COATED ORAL at 08:26

## 2021-01-01 RX ADMIN — Medication 10 ML: at 10:11

## 2021-01-01 RX ADMIN — ZINC SULFATE 220 MG (50 MG) CAPSULE 50 MG: CAPSULE at 08:25

## 2021-01-01 RX ADMIN — SODIUM CHLORIDE 500 ML: 9 INJECTION, SOLUTION INTRAVENOUS at 16:51

## 2021-01-01 RX ADMIN — MORPHINE SULFATE 5 MG: 100 SOLUTION ORAL at 15:19

## 2021-01-01 RX ADMIN — TAMSULOSIN HYDROCHLORIDE 0.4 MG: 0.4 CAPSULE ORAL at 08:35

## 2021-01-01 RX ADMIN — DEXAMETHASONE 6 MG: 4 TABLET ORAL at 14:31

## 2021-01-01 RX ADMIN — METOPROLOL TARTRATE 25 MG: 25 TABLET, FILM COATED ORAL at 20:40

## 2021-01-01 RX ADMIN — ENOXAPARIN SODIUM 30 MG: 30 INJECTION, SOLUTION INTRAVENOUS; SUBCUTANEOUS at 09:39

## 2021-01-01 RX ADMIN — AMITRIPTYLINE HYDROCHLORIDE 25 MG: 25 TABLET, FILM COATED ORAL at 08:25

## 2021-01-01 RX ADMIN — DEXAMETHASONE 6 MG: 4 TABLET ORAL at 02:19

## 2021-01-01 RX ADMIN — ENOXAPARIN SODIUM 30 MG: 30 INJECTION, SOLUTION INTRAVENOUS; SUBCUTANEOUS at 10:11

## 2021-01-01 RX ADMIN — AMITRIPTYLINE HYDROCHLORIDE 25 MG: 25 TABLET, FILM COATED ORAL at 08:35

## 2021-01-01 RX ADMIN — Medication 10 ML: at 08:25

## 2021-01-01 RX ADMIN — TAMSULOSIN HYDROCHLORIDE 0.4 MG: 0.4 CAPSULE ORAL at 10:11

## 2021-01-01 RX ADMIN — PANTOPRAZOLE SODIUM 40 MG: 40 TABLET, DELAYED RELEASE ORAL at 06:08

## 2021-01-01 RX ADMIN — ONDANSETRON 4 MG: 2 INJECTION INTRAMUSCULAR; INTRAVENOUS at 11:16

## 2021-01-01 RX ADMIN — DEXAMETHASONE 6 MG: 4 TABLET ORAL at 01:36

## 2021-01-01 RX ADMIN — SODIUM CHLORIDE, PRESERVATIVE FREE 10 ML: 5 INJECTION INTRAVENOUS at 19:35

## 2021-01-01 RX ADMIN — PANTOPRAZOLE SODIUM 40 MG: 40 TABLET, DELAYED RELEASE ORAL at 06:04

## 2021-01-01 RX ADMIN — ENOXAPARIN SODIUM 30 MG: 30 INJECTION, SOLUTION INTRAVENOUS; SUBCUTANEOUS at 08:26

## 2021-01-01 RX ADMIN — ZINC SULFATE 220 MG (50 MG) CAPSULE 50 MG: CAPSULE at 10:11

## 2021-01-01 RX ADMIN — Medication 10 ML: at 08:35

## 2021-01-01 RX ADMIN — Medication 2000 UNITS: at 08:25

## 2021-01-01 RX ADMIN — LORAZEPAM 1 MG: 2 INJECTION INTRAMUSCULAR; INTRAVENOUS at 22:35

## 2021-01-01 RX ADMIN — Medication 2000 UNITS: at 08:34

## 2021-01-01 RX ADMIN — AMITRIPTYLINE HYDROCHLORIDE 25 MG: 25 TABLET, FILM COATED ORAL at 10:11

## 2021-01-01 RX ADMIN — METOPROLOL TARTRATE 25 MG: 25 TABLET, FILM COATED ORAL at 08:35

## 2021-01-01 RX ADMIN — ZINC SULFATE 220 MG (50 MG) CAPSULE 50 MG: CAPSULE at 08:35

## 2021-01-01 RX ADMIN — PERPHENAZINE 4 MG: 2 TABLET, FILM COATED ORAL at 08:25

## 2021-01-01 RX ADMIN — Medication 2000 UNITS: at 10:11

## 2021-01-01 RX ADMIN — METOPROLOL TARTRATE 2.5 MG: 1 INJECTION, SOLUTION INTRAVENOUS at 11:48

## 2021-01-01 RX ADMIN — ENOXAPARIN SODIUM 30 MG: 30 INJECTION, SOLUTION INTRAVENOUS; SUBCUTANEOUS at 20:45

## 2021-01-01 RX ADMIN — MORPHINE SULFATE 2.5 MG: 100 SOLUTION ORAL at 14:00

## 2021-01-01 RX ADMIN — Medication 10 ML: at 20:40

## 2021-01-01 RX ADMIN — METOPROLOL TARTRATE 50 MG: 50 TABLET, FILM COATED ORAL at 20:45

## 2021-01-01 RX ADMIN — Medication 500 MG: at 08:35

## 2021-01-01 RX ADMIN — Medication 10 ML: at 09:00

## 2021-01-01 RX ADMIN — Medication 500 MG: at 08:25

## 2021-01-01 RX ADMIN — ENOXAPARIN SODIUM 30 MG: 30 INJECTION, SOLUTION INTRAVENOUS; SUBCUTANEOUS at 20:56

## 2021-01-01 RX ADMIN — Medication 10 ML: at 20:57

## 2021-01-01 RX ADMIN — AMLODIPINE BESYLATE 5 MG: 5 TABLET ORAL at 08:35

## 2021-01-01 RX ADMIN — MORPHINE SULFATE 2 MG: 2 INJECTION, SOLUTION INTRAMUSCULAR; INTRAVENOUS at 09:32

## 2021-01-01 RX ADMIN — ENOXAPARIN SODIUM 30 MG: 30 INJECTION, SOLUTION INTRAVENOUS; SUBCUTANEOUS at 20:40

## 2021-01-01 RX ADMIN — MORPHINE SULFATE 2 MG: 2 INJECTION, SOLUTION INTRAMUSCULAR; INTRAVENOUS at 12:33

## 2021-01-01 RX ADMIN — PERPHENAZINE 4 MG: 2 TABLET, FILM COATED ORAL at 10:11

## 2021-01-01 RX ADMIN — LORAZEPAM 1 MG: 2 INJECTION INTRAMUSCULAR; INTRAVENOUS at 09:32

## 2021-01-01 RX ADMIN — TAMSULOSIN HYDROCHLORIDE 0.4 MG: 0.4 CAPSULE ORAL at 08:26

## 2021-01-01 ASSESSMENT — PAIN SCALES - GENERAL
PAINLEVEL_OUTOF10: 0

## 2021-01-01 NOTE — PROGRESS NOTES
Date: 1/1/2021    Time: 0110    Patient Placed On BIPAP/CPAP/ Non-Invasive Ventilation? No    If no must comment. Facial area red/color change? If YES are Blister/Lesion present? If yes must notify nursing staff  BIPAP/CPAP skin barrier?   Yes    Skin barrier type:mepilexlite       Comments:    Red outlet already on unable to see under mepilex    Jill Toth

## 2021-01-01 NOTE — PROGRESS NOTES
Oriana Morgan M.D.,[facility]  Nick Sy D.O., F.A.C.O.I., Cordelia Chan M.D. Millie Baer M.D., Ephraim Haile M.D. Destinee Kenny D.O. Daily Pulmonary Progress Note    Patient:  Darryl Schmidt 80 y.o. female MRN: [id number]     Date of Service: 1/1/2021      Synopsis     We are following patient for respiratory failure with hypoxia, severe COVID-19 pneumonitis    \"CC\" shortness of breath    Code status: limited code status,  meds only      Subjective      Patient was seen through window, case discussed with staff in effort to conserve PPE and limit exposure to covid 19. Complaining of shortness of breath at rest.  Still requiring nonrebreather 15 liters plus  heated high flow 60 L FIO2 100%. SPO2 90% at rest. uses NIV AVAPS volume 450  at HS and as needed during day for respiratory support. No significant change. Doing poorly overall. She is currently on cardiac meds only. palliative care following. Review of Systems:  Constitutional: Denies fever, weight loss, night sweats, positive for fatigue  Skin: Denies pigmentation, dark lesions, and rashes   HEENT: Denies hearing loss, tinnitus, ear drainage, epistaxis, sore throat, and hoarseness. Cardiovascular: Denies palpitations, chest pain, and chest pressure.   Respiratory: Positive shortness of breath  Gastrointestinal: Denies nausea, vomiting, poor appetite, diarrhea, heartburn or reflux  Genitourinary: Denies dysuria, frequency, urgency or hematuria  Musculoskeletal: Denies myalgias, muscle weakness, and bone pain  Neurological: Denies dizziness, vertigo, headache, and focal weakness  Psychological: Denies anxiety and depression  Endocrine: Denies heat intolerance and cold intolerance  Hematopoietic/Lymphatic: Denies bleeding problems and blood transfusions    24-hour events:  none    Objective   Vitals: /75   Pulse 88   Temp 96.6 °F (35.9 °C) (Axillary)   Resp 22   Ht 4' 11\" (1.499 m)   Wt 125 lb (56.7 kg)   SpO2 91%   BMI 25.25 kg/m²     I/O:    Intake/Output Summary (Last 24 hours) at 1/1/2021 1204  Last data filed at 1/1/2021 0825  Gross per 24 hour   Intake 10 ml   Output 1200 ml   Net -1190 ml       Vent Information  Skin Assessment: Clean, dry, & intact  Equipment Changed: (S) Humidification(new water bag; airvo remains on standby)  Vt Ordered: 450 mL  FiO2 : 100 %  SpO2: 91 %  SpO2/FiO2 ratio: 89  I Time/ I Time %: 1 s  Humidification Source: Heated wire  Humidification Temp: 34  Humidification Temp Measured: 34  Mask Type: Nasal mask  Mask Size: Small      CURRENT MEDS :  Scheduled Meds:   furosemide  40 mg Intravenous Daily    enoxaparin  0.5 mg/kg Subcutaneous BID    tamsulosin  0.4 mg Oral Daily    dexamethasone  6 mg Oral 2 times per day    amitriptyline  25 mg Oral Daily    amLODIPine  5 mg Oral Daily    metoprolol tartrate  25 mg Oral BID    perphenazine  4 mg Oral Daily    sodium chloride flush  10 mL Intravenous 2 times per day    Vitamin D  2,000 Units Oral Daily    ascorbic acid  500 mg Oral Daily    zinc sulfate  50 mg Oral Daily    pantoprazole  40 mg Oral QAM AC       Physical Exam:  Due to the current efforts to prevent transmission of COVID-19 and also the need to preserve PPE for other caregivers, a face-to-face encounter with the patient was not performed. That being said, all relevant records and diagnostic tests were reviewed, including laboratory results and imaging. Please reference any relevant documentation elsewhere. Care will be coordinated with the primary service. .    Pertinent/ New Labs and Imaging Studies     Imaging Personally Reviewed:  Chest x-ray 12/28     Reason for exam:->resp failure   FINDINGS:   There are patchy infiltrate seen within the right perihilar and right lower   lobe. Brittany Cristóbal is an infiltrate seen within the left lung base.       Impression   1. Multifocal right perihilar and right lower lobe infiltrates. 2. Left lung base infiltrate. CT chest without contrast 12/19/2020  Patchy bilateral alveolar and interstitial infiltrates most notably in the   right upper, middle and left lower lobes probably representing pneumonia. Other infectious/inflammatory processes such as COVID-19 are also considered. Coronary artery/atherosclerotic disease   FINDINGS:   Mediastinum:   Lungs/pleura:   Upper Abdomen:   Soft Tissues/Bones:         ECHO  2019        Summary   Left ventricular internal dimensions were normal in diastole and systole. No regional wall motion abnormalities seen. Normal left ventricular ejection fraction. There is doppler evidence of stage I diastolic dysfunction. Focal calcification mitral valve leaflets. Mild mitral annular calcification. The aortic valve appears mildly sclerotic. Labs:  Lab Results   Component Value Date    WBC 22.2 12/31/2020    HGB 13.4 12/31/2020    HCT 40.9 12/31/2020    MCV 90.3 12/31/2020    MCH 29.6 12/31/2020    MCHC 32.8 12/31/2020    RDW 13.2 12/31/2020     12/31/2020    MPV 9.3 12/31/2020     Lab Results   Component Value Date     12/31/2020    K 4.2 12/31/2020     12/31/2020    CO2 21 12/31/2020    BUN 36 12/31/2020    CREATININE 0.5 12/31/2020    LABALBU 3.1 12/31/2020    CALCIUM 9.1 12/31/2020    GFRAA >60 12/31/2020    LABGLOM >60 12/31/2020     Lab Results   Component Value Date    PROTIME 13.7 12/19/2020    INR 1.2 12/19/2020     No results for input(s): PROBNP in the last 72 hours. No results for input(s): PROCAL in the last 72 hours. This SmartLink has not been configured with any valid records. 12/23/2020  Ferritin 837    CRP 10.8    Micro:  Strep pneumo antigen, Legionella urine antigen pending  Procalcitonin 0.11   Assessment:    1. Acute respiratory failure with hypoxia  2. Severe COVID-19 pneumonitis  3. Elevated inflammatory markers  4. Lymphopenia      Plan:   1.  Oxygen therapy -heated high flow nasal cannula to maintain saturation greater than 90% currently on 15 L NRB plus AirVo 60 L FiO2 100%  2. AVAPS as needed for respiratory support target volume 400-450  3. COVID-19 treatment- remdesivir completed 5 total days. Tocilizumab given x1 dose 12/23/2020. Decadron 6 mg p.o. twice daily, continue vitamin protocol  4. Trend inflammatory markers. Procalcitonin 0.11  5. Chest x-ray 12/28  6. GI prophylaxis  7. Incentive spirometer  8. Lovenox increase to intermediate  0.5 mg/kg SQ BID dosing  for DVT prophylaxis. D-dimer increased   9. Consult palliative medicine      Code Status: Limited meds only      Justino Resendiz M.D.    Pulmonary/Critical Care Medicine

## 2021-01-01 NOTE — PROGRESS NOTES
[facility] Progress Note    Admitting Date and Time: 12/19/2020 11:18 AM  Admit Dx: Acute on chronic respiratory failure with hypoxia (HCC) [J96.21]    Subjective:  Patient is being followed for Acute on chronic respiratory failure with hypoxia ([address] [J96.21] . Resting more comfortable. Has NIV on    Pt looks calm and breathing comfortably. O2 sat:  91% on NIV     Fever:  96.6    Per RN: no concerns    ROS: denies cp, n/v, HA unless stated above.       furosemide  40 mg Intravenous Daily    enoxaparin  0.5 mg/kg Subcutaneous BID    tamsulosin  0.4 mg Oral Daily    dexamethasone  6 mg Oral 2 times per day    amitriptyline  25 mg Oral Daily    amLODIPine  5 mg Oral Daily    metoprolol tartrate  25 mg Oral BID    perphenazine  4 mg Oral Daily    sodium chloride flush  10 mL Intravenous 2 times per day    Vitamin D  2,000 Units Oral Daily    ascorbic acid  500 mg Oral Daily    zinc sulfate  50 mg Oral Daily    pantoprazole  40 mg Oral QAM AC         morphine, 1 mg, Q4H PRN      [Held by provider] morphine 20MG/ML, 2.5 mg, Q6H PRN      albuterol sulfate HFA, 2 puff, Q6H PRN      sodium chloride, 30 mL, PRN      sodium chloride flush, 10 mL, PRN      promethazine, 12.5 mg, Q6H PRN    Or      ondansetron, 4 mg, Q6H PRN      polyethylene glycol, 17 g, Daily PRN      acetaminophen, 650 mg, Q6H PRN    Or      acetaminophen, 650 mg, Q6H PRN      guaiFENesin-dextromethorphan, 5 mL, Q4H PRN         Objective:    /75   Pulse 88   Temp 96.6 °F (35.9 °C) (Axillary)   Resp 21   Ht 4' 11\" (1.499 m)   Wt 125 lb (56.7 kg)   SpO2 91%   BMI 25.25 kg/m²     General Appearance: alert and oriented to person, place and time and in no acute distress  Skin: warm and dry  Head: normocephalic and atraumatic  Eyes: pupils equal, round, and reactive to light, extraocular eye movements intact, conjunctivae normal  Neck: neck supple and non tender without mass   Pulmonary/Chest: crackles bilaterally- no wheezes, no respiratory distress  Cardiovascular: normal rate, normal S1 and S2 and no carotid bruits  Abdomen: soft, non-tender, non-distended, normal bowel sounds, no masses or organomegaly  Extremities: no cyanosis, no clubbing and no edema  Neurologic: no cranial nerve deficit and speech normal    Recent Labs     12/30/20  1120 12/31/20  1300    133   K 4.5 4.2    101   CO2 21* 21*   BUN 35* 36*   CREATININE 0.7 0.5   GLUCOSE 133* 111*   CALCIUM 9.3 9.1       Recent Labs     12/30/20  1120 12/31/20  1300   WBC 19.0* 22.2*   RBC 4.74 4.53   HGB 14.2 13.4   HCT 43.0 40.9   MCV 90.7 90.3   MCH 30.0 29.6   MCHC 33.0 32.8   RDW 13.2 13.2    210   MPV 9.2 9.3     Radiology:  n/a    Assessment:    Active Problems:    Acute respiratory failure with hypoxia (HCC)    COVID-19    Pneumonia due to COVID-19 virus    Thrombocytopenia (HCC)    Hyperglycemia    Acute respiratory distress    Elevated LFTs  Resolved Problems:    * No resolved hospital problems. *    Plan:  1. Acute hypoxic respiratory failure, most likely due to viral pneumonia, O2 sat was below 90% on room air, requiring NIV and oxygen saturation above 90%.  Treating the underlying process. IV Lasix ordered now and then Lasix 40 mg daily starting tomorrow. Morphine 1 mg IV q 4 hrs. 2.  Acute viral pneumonia, rapid influenza a and B were negative, respiratory panel was not tested, procalcitonin was 0.11, CAT scan of the chest showed bilateral patchy groundglass opacities, sent for COVID-19, result positive. 3.  Acute Hypoxic Respiratory Failure - continue oxygen support, MDIs, Flutter therapy. Continue IV Morphine. Looks more comfortable respiratory wise and not agitated.   4.  LFTs, normalizing.  Monitor and treat accordingly.  Avoid Hepatotoxic meds.       Electronically signed by Ramya Salcedo MD on 1/1/2021 at 8:57 AM

## 2021-01-02 NOTE — PROGRESS NOTES
Edna Zeng M.D.,[facility]  Giana Cota D.O., F.A.C.O.I., Vic Calvin M.D. Saad Sharma M.D., Ana Dennison M.D. Melba Mantilla D.O. Daily Pulmonary Progress Note    Patient:  Sarah Schmidt 80 y.o. female MRN: [id number]     Date of Service: 1/2/2021      Synopsis     We are following patient for respiratory failure with hypoxia, severe COVID-19 pneumonitis    \"CC\" shortness of breath    Code status: limited code status,  meds only      Subjective      Patient was seen through window, case discussed with staff in effort to conserve PPE and limit exposure to covid 19. Complaining of shortness of breath at rest.  Still requiring nonrebreather 15 liters plus  heated high flow 60 L FIO2 100%. SPO2 90% at rest. uses NIV AVAPS volume 450  at HS and as needed during day for respiratory support. No significant change. Doing poorly overall. She is currently on cardiac meds only. palliative care following. Review of Systems:  Constitutional: Denies fever, weight loss, night sweats,   Skin: Denies pigmentation, dark lesions, and rashes   HEENT: Denies hearing loss, tinnitus, ear drainage, epistaxis, sore throat, and hoarseness. Cardiovascular: Denies palpitations, chest pain, and chest pressure.   Respiratory: Positive shortness of breath  Gastrointestinal: Denies nausea, vomiting, poor appetite, diarrhea, heartburn or reflux  Genitourinary: Denies dysuria, frequency, urgency or hematuria  Musculoskeletal: Denies myalgias, muscle weakness, and bone pain  Neurological: Denies dizziness, vertigo, headache, and focal weakness  Psychological: Denies anxiety and depression  Endocrine: Denies heat intolerance and cold intolerance  Hematopoietic/Lymphatic: Denies bleeding problems and blood transfusions    24-hour events:  none    Objective   Vitals: /73   Pulse 117   Temp 97.5 °F (36.4 °C) (Axillary)   Resp 26   Ht 4' 11\" (1.499 m)   Wt 125 lb (56.7 kg)   SpO2 (!) 89% BMI 25.25 kg/m²     I/O:    Intake/Output Summary (Last 24 hours) at 1/2/2021 1829  Last data filed at 1/2/2021 1545  Gross per 24 hour   Intake --   Output 600 ml   Net -600 ml       Vent Information  Skin Assessment: Clean, dry, & intact  Equipment Changed: (S) Humidification(new water bag; airvo remains on standby)  Vt Ordered: 450 mL  FiO2 : 100 %  SpO2: (!) 89 %  SpO2/FiO2 ratio: 95  I Time/ I Time %: 1 s  Humidification Source: Heated wire  Humidification Temp: 34  Humidification Temp Measured: 34  Mask Type: Full face mask  Mask Size: Small      CURRENT MEDS :  Scheduled Meds:   metoprolol tartrate  25 mg Oral Once    metoprolol tartrate  50 mg Oral BID    sodium chloride  500 mL Intravenous Once    furosemide  40 mg Intravenous Daily    enoxaparin  0.5 mg/kg Subcutaneous BID    tamsulosin  0.4 mg Oral Daily    dexamethasone  6 mg Oral 2 times per day    amitriptyline  25 mg Oral Daily    amLODIPine  5 mg Oral Daily    perphenazine  4 mg Oral Daily    sodium chloride flush  10 mL Intravenous 2 times per day    Vitamin D  2,000 Units Oral Daily    ascorbic acid  500 mg Oral Daily    zinc sulfate  50 mg Oral Daily    pantoprazole  40 mg Oral QAM AC       Physical Exam:  Due to the current efforts to prevent transmission of COVID-19 and also the need to preserve PPE for other caregivers, a face-to-face encounter with the patient was not performed. That being said, all relevant records and diagnostic tests were reviewed, including laboratory results and imaging. Please reference any relevant documentation elsewhere. Care will be coordinated with the primary service. .    Pertinent/ New Labs and Imaging Studies     Imaging Personally Reviewed:  Chest x-ray 12/28     Reason for exam:->resp failure   FINDINGS:   There are patchy infiltrate seen within the right perihilar and right lower   lobe. Brittany Mash is an infiltrate seen within the left lung base.       Impression   1.  Multifocal right perihilar and right lower lobe infiltrates. 2. Left lung base infiltrate. CT chest without contrast 12/19/2020  Patchy bilateral alveolar and interstitial infiltrates most notably in the   right upper, middle and left lower lobes probably representing pneumonia. Other infectious/inflammatory processes such as COVID-19 are also considered. Coronary artery/atherosclerotic disease   FINDINGS:   Mediastinum:   Lungs/pleura:   Upper Abdomen:   Soft Tissues/Bones:         ECHO  2019        Summary   Left ventricular internal dimensions were normal in diastole and systole. No regional wall motion abnormalities seen. Normal left ventricular ejection fraction. There is doppler evidence of stage I diastolic dysfunction. Focal calcification mitral valve leaflets. Mild mitral annular calcification. The aortic valve appears mildly sclerotic. Labs:  Lab Results   Component Value Date    WBC 17.8 01/02/2021    HGB 13.1 01/02/2021    HCT 39.3 01/02/2021    MCV 89.7 01/02/2021    MCH 29.9 01/02/2021    MCHC 33.3 01/02/2021    RDW 13.4 01/02/2021     01/02/2021    MPV 9.8 01/02/2021     Lab Results   Component Value Date     01/02/2021    K 4.3 01/02/2021    CL 98 01/02/2021    CO2 24 01/02/2021    BUN 39 01/02/2021    CREATININE 0.6 01/02/2021    LABALBU 3.2 01/02/2021    CALCIUM 9.2 01/02/2021    GFRAA >60 01/02/2021    LABGLOM >60 01/02/2021     Lab Results   Component Value Date    PROTIME 13.7 12/19/2020    INR 1.2 12/19/2020     No results for input(s): PROBNP in the last 72 hours. No results for input(s): PROCAL in the last 72 hours. This SmartLink has not been configured with any valid records. 12/23/2020  Ferritin 837    CRP 10.8    Micro:  Strep pneumo antigen, Legionella urine antigen pending  Procalcitonin 0.11   Assessment:    1. Acute respiratory failure with hypoxia  2. Severe COVID-19 pneumonitis  3. Elevated inflammatory markers  4.  Lymphopenia      Plan: 1. Oxygen therapy -heated high flow nasal cannula to maintain saturation greater than 90% currently on 15 L NRB plus AirVo 60 L FiO2 100%  2. AVAPS as needed for respiratory support target volume 400-450  3. COVID-19 treatment- remdesivir completed 5 total days. Tocilizumab given x1 dose 12/23/2020. Decadron 6 mg p.o. twice daily, continue vitamin protocol  4. Trend inflammatory markers. Procalcitonin 0.11  5. Chest x-ray 12/28  6. GI prophylaxis  7. Incentive spirometer  8. Lovenox increase to intermediate  0.5 mg/kg SQ BID dosing  for DVT prophylaxis. D-dimer increased   9. Consult palliative medicine      Code Status: Limited meds only      Lon Pascal M.D.    Pulmonary/Critical Care Medicine

## 2021-01-02 NOTE — PROGRESS NOTES
Dr. Eboni Nair made aware of bipap tubing becoming dislodged and patient having low oxygen saturation with low o2 sats. Oxygen saturation improving. Dr. Eboni Nair came to see patient.

## 2021-01-02 NOTE — PROGRESS NOTES
Date: 1/2/2021    Time: 5:46 PM    Patient Placed On BIPAP/CPAP/ Non-Invasive Ventilation? Yes    If no must comment. Facial area red/color change? No           If YES are Blister/Lesion present? No   If yes must notify nursing staff  BIPAP/CPAP skin barrier?   Yes    Skin barrier type:mepilexlite       Comments:        Rody Meyers

## 2021-01-02 NOTE — PROGRESS NOTES
normocephalic and atraumatic  Eyes: pupils equal, round, and reactive to light, extraocular eye movements intact, conjunctivae normal  Neck: neck supple and non tender without mass   Pulmonary/Chest: crackles bilaterally- no wheezes, moderate respiratory distress with chest retractions  Cardiovascular: normal rate, normal S1 and S2 and no carotid bruits  Abdomen: soft, non-tender, non-distended, normal bowel sounds, no masses or organomegaly  Extremities: no cyanosis, no clubbing and no edema  Neurologic: no cranial nerve deficit and speech normal    Recent Labs     12/30/20  1120 12/31/20  1300 01/01/21  1210    133 134   K 4.5 4.2 3.9    101 96*   CO2 21* 21* 25   BUN 35* 36* 41*   CREATININE 0.7 0.5 0.6   GLUCOSE 133* 111* 119*   CALCIUM 9.3 9.1 9.7       Recent Labs     12/30/20  1120 12/31/20  1300 01/01/21  1210   WBC 19.0* 22.2* 16.7*   RBC 4.74 4.53 5.00   HGB 14.2 13.4 14.7   HCT 43.0 40.9 44.7   MCV 90.7 90.3 89.4   MCH 30.0 29.6 29.4   MCHC 33.0 32.8 32.9   RDW 13.2 13.2 13.4    210 219   MPV 9.2 9.3 9.9     Radiology:  CXR pending    Assessment:    Active Problems:    Acute respiratory failure with hypoxia (HCC)    COVID-19    Pneumonia due to COVID-19 virus    Thrombocytopenia (HCC)    Hyperglycemia    Acute respiratory distress    Elevated LFTs  Resolved Problems:    * No resolved hospital problems. *    Plan:  1. Acute hypoxic respiratory failure, most likely due to viral pneumonia, O2 sat was below 90% on room air, requiring NIV and oxygen saturation above 90%.  Treating the underlying process.  Repeat CXR ordered Morphine 1 mg IV q 4 hrs prn. 2.  Acute viral pneumonia, rapid influenza a and B were negative, respiratory panel was not tested, procalcitonin was 0.11, CAT scan of the chest showed bilateral patchy groundglass opacities, sent for COVID-19, result positive. 3.  Acute Hypoxic Respiratory Failure - continue oxygen support, MDIs, Flutter therapy. Continue IV Morphine. Updated daughter, Frandy Kinney  4.   LFTs, normalizing.  Monitor and treat accordingly.  Avoid Hepatotoxic meds.        Electronically signed by Nimco Goins MD on 1/2/2021 at 9:41 AM

## 2021-01-03 NOTE — PROGRESS NOTES
[facility] Progress Note    Admitting Date and Time: 12/19/2020 11:18 AM  Admit Dx: Acute on chronic respiratory failure with hypoxia (HCC) [J96.21]    Subjective:  Patient is being followed for Acute on chronic respiratory failure with hypoxia ([address] [J96.21]     Pt resting now on 60L    O2 sat:  85% on 60L     Fever:  97.6    Per RN:  HR variable; BP low normal    ROS: denies cp, n/v, HA unless stated above.       Morphine Sulfate (Concentrate)  2.5 mg Oral 6x Daily    metoprolol tartrate  25 mg Oral Once    metoprolol tartrate  50 mg Oral BID    furosemide  40 mg Intravenous Daily    enoxaparin  0.5 mg/kg Subcutaneous BID    tamsulosin  0.4 mg Oral Daily    dexamethasone  6 mg Oral 2 times per day    amitriptyline  25 mg Oral Daily    amLODIPine  5 mg Oral Daily    perphenazine  4 mg Oral Daily    sodium chloride flush  10 mL Intravenous 2 times per day    Vitamin D  2,000 Units Oral Daily    ascorbic acid  500 mg Oral Daily    zinc sulfate  50 mg Oral Daily    pantoprazole  40 mg Oral QAM AC         [Held by provider] morphine 20MG/ML, 2.5 mg, Q6H PRN      albuterol sulfate HFA, 2 puff, Q6H PRN      sodium chloride, 30 mL, PRN      sodium chloride flush, 10 mL, PRN      promethazine, 12.5 mg, Q6H PRN    Or      ondansetron, 4 mg, Q6H PRN      polyethylene glycol, 17 g, Daily PRN      acetaminophen, 650 mg, Q6H PRN    Or      acetaminophen, 650 mg, Q6H PRN      guaiFENesin-dextromethorphan, 5 mL, Q4H PRN    Objective:    /64   Pulse 94   Temp 97.6 °F (36.4 °C) (Axillary)   Resp 20   Ht 4' 11\" (1.499 m)   Wt 125 lb (56.7 kg)   SpO2 92%   BMI 25.25 kg/m²     General Appearance: alert and oriented to person, place and time and in no acute distress  Skin: warm and dry  Head: normocephalic and atraumatic  Eyes: pupils equal, round, and reactive to light, extraocular eye movements intact, conjunctivae normal  Neck: neck supple and non tender without mass Pulmonary/Chest: crackles bilaterally- no wheezes, no respiratory distress  Cardiovascular: normal rate, normal S1 and S2 and no carotid bruits  Abdomen: soft, non-tender, non-distended, normal bowel sounds, no masses or organomegaly  Extremities: no cyanosis, no clubbing and no edema  Neurologic: no cranial nerve deficit and speech normal    Recent Labs     01/01/21  1210 01/02/21  1505    132   K 3.9 4.3   CL 96* 98   CO2 25 24   BUN 41* 39*   CREATININE 0.6 0.6   GLUCOSE 119* 143*   CALCIUM 9.7 9.2       Recent Labs     01/01/21  1210 01/02/21  1505   WBC 16.7* 17.8*   RBC 5.00 4.38   HGB 14.7 13.1   HCT 44.7 39.3   MCV 89.4 89.7   MCH 29.4 29.9   MCHC 32.9 33.3   RDW 13.4 13.4    168   MPV 9.9 9.8     Radiology:  n/a    Assessment:    Active Problems:    Acute respiratory failure with hypoxia (HCC)    COVID-19    Pneumonia due to COVID-19 virus    Thrombocytopenia (HCC)    Hyperglycemia    Acute respiratory distress    Elevated LFTs  Resolved Problems:    * No resolved hospital problems. *      Plan:  1. Acute hypoxic respiratory failure, most likely due to viral pneumonia, O2 sat was below 90% on room air, requiring 60L nasal cannula 20 and oxygen saturation at 85%. Treating the underlying process. 2.  Acute viral pneumonia, rapid influenza a and B were negative, respiratory panel was not tested, procalcitonin was 0.11, CAT scan of the chest showed bilateral patchy groundglass opacities, sent for COVID-19, result positive  3.  Acute Hypoxic Respiratory Failure - continue oxygen support, MDIs, Flutter therapy.  Continue SL Morphine.  Updated daughter, Jose Yancey  4.   LFTs, normalizing.  Monitor and treat accordingly.  Avoid Hepatotoxic meds.        Electronically signed by Keith Grimaldo MD on 1/3/2021 at 1:15 PM

## 2021-01-03 NOTE — PROGRESS NOTES
Date: 1/2/2021    Time: 10:16 PM    Patient Placed On BIPAP/CPAP/ Non-Invasive Ventilation? Yes    If no must comment. Facial area red/color change? No           If YES are Blister/Lesion present? No   If yes must notify nursing staff  BIPAP/CPAP skin barrier?   Yes    Skin barrier type:mepilexlite       Comments:        [facility]

## 2021-01-03 NOTE — PROGRESS NOTES
Date: 1/3/2021    Time: 5:37 PM    Patient Placed On BIPAP/CPAP/ Non-Invasive Ventilation? Yes    If no must comment. Facial area red/color change? No           If YES are Blister/Lesion present? No   If yes must notify nursing staff  BIPAP/CPAP skin barrier?   Yes    Skin barrier type:mepilexlite       Comments:        Dinora Ponce

## 2021-01-03 NOTE — SIGNIFICANT EVENT
Patient with varying heart rate today. Weaned from NIV and on non-rebreather and NC at 60 L resting comfortably and in no distress. O2 sats in mid 80s. Patient in no distress. Comfort care measures at this point. Call to walter Cardenas Barbara done and left message. Patient's nurse aware.

## 2021-01-03 NOTE — PROGRESS NOTES
Physical Therapy  PT orders received. Pt has been on hold for 5 days due to medical status and high O2 demands. Will discharge current order. Please re-order as appropriate. Thank you.

## 2021-01-04 NOTE — PROGRESS NOTES
Updated daughter Phyllis Delgadillo on patient's condition. Stated that she just wants her mom to be comfortable and not to place bipap back on patient as her mom had stated before that she does not want it. Pt currently on heated high flow and NRB with O2 sats between 60-70%. Pt in no current distress.

## 2021-01-04 NOTE — PROGRESS NOTES
Patient currently under hospice care. Pulmonary will sign off at this time. Please call if any further needs arise.     Hanna Reid, EMETERIO-CNP

## 2021-01-04 NOTE — PROGRESS NOTES
Nutrition Assessment     Type and Reason for Visit: Reassess    Nutrition Assessment:  Pt further declining from a nutritional stand-point AEB pt w/ minimal intake since adm 2/2 SOB and poor appetite 2/2 COVID19+. Unfortunately, pt w/ noted further decline today and now to be comfort care/hospice currently. Will sign-off at this time. Please consult if RD needed.     Electronically signed by Isi Alvarado RD, BIBIANA on 1/4/21 at 4:04 PM EST    Contact: ext [phone]

## 2021-01-04 NOTE — PROGRESS NOTES
Department of Internal Medicine  General Internal Medicine  Attending Progress Note      SUBJECTIVE:    Patient is lethargic. She is actively dying. DNR CC order placed.-Request.  Hospice consult.     OBJECTIVE      Medications    Current Facility-Administered Medications: morphine 20MG/ML concentrated solution 5 mg, 5 mg, Oral, Q4H PRN  morphine (PF) injection 2 mg, 2 mg, Intravenous, Q2H PRN  LORazepam (ATIVAN) injection 1 mg, 1 mg, Intravenous, Q6H PRN  glycopyrrolate (ROBINUL) injection 0.2 mg, 0.2 mg, Intravenous, Q4H PRN  metoprolol tartrate (LOPRESSOR) tablet 25 mg, 25 mg, Oral, Once  metoprolol tartrate (LOPRESSOR) tablet 50 mg, 50 mg, Oral, BID  furosemide (LASIX) injection 40 mg, 40 mg, Intravenous, Daily  enoxaparin (LOVENOX) injection 30 mg, 0.5 mg/kg, Subcutaneous, BID  [Held by provider] morphine 20MG/ML concentrated solution 2.5 mg, 2.5 mg, Oral, Q6H PRN  albuterol sulfate  (90 Base) MCG/ACT inhaler 2 puff, 2 puff, Inhalation, Q6H PRN  tamsulosin (FLOMAX) capsule 0.4 mg, 0.4 mg, Oral, Daily  0.9 % sodium chloride bolus, 30 mL, Intravenous, PRN  dexamethasone (DECADRON) tablet 6 mg, 6 mg, Oral, 2 times per day  amitriptyline (ELAVIL) tablet 25 mg, 25 mg, Oral, Daily  amLODIPine (NORVASC) tablet 5 mg, 5 mg, Oral, Daily  perphenazine tablet 4 mg, 4 mg, Oral, Daily  sodium chloride flush 0.9 % injection 10 mL, 10 mL, Intravenous, 2 times per day  sodium chloride flush 0.9 % injection 10 mL, 10 mL, Intravenous, PRN  promethazine (PHENERGAN) tablet 12.5 mg, 12.5 mg, Oral, Q6H PRN **OR** ondansetron (ZOFRAN) injection 4 mg, 4 mg, Intravenous, Q6H PRN  polyethylene glycol (GLYCOLAX) packet 17 g, 17 g, Oral, Daily PRN  acetaminophen (TYLENOL) tablet 650 mg, 650 mg, Oral, Q6H PRN **OR** acetaminophen (TYLENOL) suppository 650 mg, 650 mg, Rectal, Q6H PRN  guaiFENesin-dextromethorphan (ROBITUSSIN DM) 100-10 MG/5ML syrup 5 mL, 5 mL, Oral, Q4H PRN  Vitamin D (CHOLECALCIFEROL) tablet 2,000 Units, 2,000 Units, Oral, Daily  ascorbic acid (VITAMIN C) tablet 500 mg, 500 mg, Oral, Daily  zinc sulfate (ZINCATE) capsule 50 mg, 50 mg, Oral, Daily  pantoprazole (PROTONIX) tablet 40 mg, 40 mg, Oral, QAM AC  Physical    VITALS:  /70   Pulse 124   Temp 97.9 °F (36.6 °C) (Axillary)   Resp (!) 31   Ht 4' 11\" (1.499 m)   Wt 125 lb (56.7 kg)   SpO2 (!) 82%   BMI 25.25 kg/m²   Patient is lethargic minimally responsive to sternal rub. Saturating poorly well on BiPAP. Per daughter's request BiPAP to be removed and patient made comfortable. Medication that were ordered included comfort care medication. Data    CBC:   Lab Results   Component Value Date    WBC 17.8 01/02/2021    RBC 4.38 01/02/2021    HGB 13.1 01/02/2021    HCT 39.3 01/02/2021    MCV 89.7 01/02/2021    MCH 29.9 01/02/2021    MCHC 33.3 01/02/2021    RDW 13.4 01/02/2021     01/02/2021    MPV 9.8 01/02/2021     BMP:    Lab Results   Component Value Date     01/02/2021    K 4.3 01/02/2021    CL 98 01/02/2021    CO2 24 01/02/2021    BUN 39 01/02/2021    LABALBU 3.2 01/02/2021    CREATININE 0.6 01/02/2021    CALCIUM 9.2 01/02/2021    GFRAA >60 01/02/2021    LABGLOM >60 01/02/2021    GLUCOSE 143 01/02/2021       ASSESSMENT AND PLAN      Active Problems:    Acute respiratory failure with hypoxia (HCC)      Pneumonia due to COVID-19 virus      Thrombocytopenia (HCC)      Hyperglycemia      Acute respiratory distress      Elevated LFTs  Patient is actively dying making patient comfort care. We will continue to monitor. Hospice team consulted. Seems like patient may not make it through the night.

## 2021-01-04 NOTE — PROGRESS NOTES
Referral received. Chart reviewed. Spoke with bedside nurse, Zhen Fenton. Patient was declining early this morning. Daughter was called and wanted patient to be a DNR-CC and off of bipap. Patient currently on bipap awaiting MD orders. Bedside nurse to medicate and remove off of bipap. This nurse called daughter Shandra Ding. She was understanding of hospice services. Discussed her mother's current condition and most likely will pass quickly. Reviewed process to remove bipap. Shandra Ding was understanding. Explained bereavement services available if needed. Shandra Ding was tearful and asked if someone would be with her mom. Bedside RN, Zhen Fenton stated he would stay in the room. Hospice will follow-up if needed.  Electronically signed by Ruth Saucedo RN on 1/4/2021 at 12:39 PM

## 2021-01-04 NOTE — PROGRESS NOTES
Occupational Therapy  OT order discharged this date as pt has poor respiratory status, prolonged hold for therapy since referral and decision to move with hospice referral. Thank you.     David Goins OTR/L  [id number]

## 2021-01-04 NOTE — PROGRESS NOTES
Family choiced [facility] hospice. Consult placed. Will continue to monitor.

## 2021-01-04 NOTE — PROGRESS NOTES
Niki Caban M.D.,[facility]  Ifrah Story D.O., F.A.C.O.I., Sherrie Winters M.D. Fannie Herrera M.D., Brandon Escobar M.D. Nick Mejia D.O. Daily Pulmonary Progress Note    Patient:  Toshia Schmidt 80 y.o. female MRN: [id number]     Date of Service: 1/3/2021      Synopsis     We are following patient for respiratory failure with hypoxia, severe COVID-19 pneumonitis    \"CC\" shortness of breath    Code status: limited code status,  meds only      Subjective      Patient was seen through window, case discussed with staff in effort to conserve PPE and limit exposure to covid 19. Complaining of shortness of breath at rest.  Still requiring nonrebreather 15 liters plus  heated high flow 60 L FIO2 100%. SPO2 90% at rest. uses NIV AVAPS volume 450  at HS and as needed during day for respiratory support. No significant change. Doing poorly overall. She is currently on cardiac meds only. palliative care following. Review of Systems:  Constitutional: Denies fever, weight loss, night sweats,   Skin: Denies pigmentation, dark lesions, and rashes   HEENT: Denies hearing loss, tinnitus, ear drainage, epistaxis, sore throat, and hoarseness. Cardiovascular: Denies palpitations, chest pain, and chest pressure.   Respiratory: Positive shortness of breath  Gastrointestinal: Denies nausea, vomiting, poor appetite, diarrhea, heartburn or reflux  Genitourinary: Denies dysuria, frequency, urgency or hematuria  Musculoskeletal: Denies myalgias, muscle weakness, and bone pain  Neurological: Denies dizziness, vertigo, headache, and focal weakness  Psychological: Denies anxiety and depression  Endocrine: Denies heat intolerance and cold intolerance  Hematopoietic/Lymphatic: Denies bleeding problems and blood transfusions    24-hour events:  none    Objective   Vitals: BP (!) 111/53   Pulse 111   Temp 97.6 °F (36.4 °C) (Axillary)   Resp 19   Ht 4' 11\" (1.499 m)   Wt 125 lb (56.7 kg)   SpO2 92% without contrast 12/19/2020  Patchy bilateral alveolar and interstitial infiltrates most notably in the   right upper, middle and left lower lobes probably representing pneumonia. Other infectious/inflammatory processes such as COVID-19 are also considered. Coronary artery/atherosclerotic disease   FINDINGS:   Mediastinum:   Lungs/pleura:   Upper Abdomen:   Soft Tissues/Bones:         ECHO  2019        Summary   Left ventricular internal dimensions were normal in diastole and systole. No regional wall motion abnormalities seen. Normal left ventricular ejection fraction. There is doppler evidence of stage I diastolic dysfunction. Focal calcification mitral valve leaflets. Mild mitral annular calcification. The aortic valve appears mildly sclerotic. Labs:  Lab Results   Component Value Date    WBC 17.8 01/02/2021    HGB 13.1 01/02/2021    HCT 39.3 01/02/2021    MCV 89.7 01/02/2021    MCH 29.9 01/02/2021    MCHC 33.3 01/02/2021    RDW 13.4 01/02/2021     01/02/2021    MPV 9.8 01/02/2021     Lab Results   Component Value Date     01/02/2021    K 4.3 01/02/2021    CL 98 01/02/2021    CO2 24 01/02/2021    BUN 39 01/02/2021    CREATININE 0.6 01/02/2021    LABALBU 3.2 01/02/2021    CALCIUM 9.2 01/02/2021    GFRAA >60 01/02/2021    LABGLOM >60 01/02/2021     Lab Results   Component Value Date    PROTIME 13.7 12/19/2020    INR 1.2 12/19/2020     No results for input(s): PROBNP in the last 72 hours. No results for input(s): PROCAL in the last 72 hours. This SmartLink has not been configured with any valid records. 12/23/2020  Ferritin 837    CRP 10.8    Micro:  Strep pneumo antigen, Legionella urine antigen pending  Procalcitonin 0.11   Assessment:    1. Acute respiratory failure with hypoxia  2. Severe COVID-19 pneumonitis  3. Elevated inflammatory markers  4. Lymphopenia      Plan:   1.  Oxygen therapy -heated high flow nasal cannula to maintain saturation greater than 90%

## 2021-01-05 NOTE — DISCHARGE SUMMARY
Physician Discharge Summary     Patient ID:  Epifanio Norman  [id number]  80 y.o.  1929    Admit date: 2020    Discharge date and time: 2021  4:12 PM     Admitting Physician: Oksana Holman MD     Discharge Physician: Ilan Jay     Admission Diagnoses: Acute on chronic respiratory failure with hypoxia ([facility] [address] [J96.21]    Discharge Diagnoses:   1. COVID-19 viral infection  2. Acute respiratory failure with hypoxia  3. Pneumonia due to Covid virus  4. Hypertension essential    Admission Condition: poor    Discharged Condition:     Indication for Admission: Respiratory failure    Hospital Course:   Ms. Domenic Bradshaw was admitted with fatigue. Further examination showed that she has COVID-19 viral infection. Medication was started and she was aggressively managed. At one point she was on BiPAP. However she continued to decline. Per family request patient was made comfort care. Patient  afterwards. Consults: none    Significant Diagnostic Studies:     Treatments: steroids: deltasone    Discharge Exam:      Disposition:   Date of death: 2021    In process/preliminary results:  Outstanding Order Results     No orders found from 2020 to 2020.           Patient Instructions:   Discharge Medication List as of 2021  4:13 PM      CONTINUE these medications which have NOT CHANGED    Details   valsartan (DIOVAN) 160 MG tablet Take 160 mg by mouth dailyHistorical Med      perphenazine 4 MG tablet TAKE ONE TABLET BY MOUTH EVERY DAY, R-2Historical Med      potassium chloride (KLOR-CON) 10 MEQ extended release tablet TAKE 2 TABLETS BY MOUTH TWICE DAILY, R-11Historical Med      metoprolol tartrate (LOPRESSOR) 50 MG tablet TAKE 1 AND 1/2 TABLETS BY MOUTH TWICE A DAY, R-2Historical Med      amLODIPine (NORVASC) 5 MG tablet Take 1 tablet by mouth daily, Disp-90 tablet, R-1Normal      amitriptyline (ELAVIL) 25 MG tablet Take 1 tablet by mouth daily, Disp-30 tablet, R-3Normal      hydrochlorothiazide (HYDRODIURIL) 25 MG tablet Take 1 tablet by mouth daily, Disp-30 tablet, R-3Normal      aspirin-acetaminophen-caffeine (EXCEDRIN MIGRAINE) 250-250-65 MG per tablet Take 1 tablet by mouth every 6 hours as needed for HeadachesHistorical Med           Activity:   Diet:   Wound Care: none needed      Signed:  Cheri Yañez  1/5/2021  7:56 AM

## 2021-07-15 NOTE — PROGRESS NOTES
Sagar Garay Hospitalist   Progress Note    Admitting Date and Time: 12/19/2020 11:18 AM  Admit Dx: Acute on chronic respiratory failure with hypoxia ([facility]) [J96.21]    Subjective:    Patient was admitted with Acute on chronic respiratory failure with hypoxia ([facility] [address] [J96.21]. Patient denies fever, chills, cp, sob, n/v.     amitriptyline  25 mg Oral Daily    amLODIPine  5 mg Oral Daily    metoprolol tartrate  25 mg Oral BID    perphenazine  4 mg Oral Daily    sodium chloride flush  10 mL Intravenous 2 times per day    enoxaparin  40 mg Subcutaneous Daily    dexamethasone  6 mg Oral Daily    Vitamin D  2,000 Units Oral Daily    ascorbic acid  500 mg Oral Daily    zinc sulfate  50 mg Oral Daily    pantoprazole  40 mg Oral QAM AC         sodium chloride flush, 10 mL, PRN      promethazine, 12.5 mg, Q6H PRN    Or      ondansetron, 4 mg, Q6H PRN      polyethylene glycol, 17 g, Daily PRN      acetaminophen, 650 mg, Q6H PRN    Or      acetaminophen, 650 mg, Q6H PRN      guaiFENesin-dextromethorphan, 5 mL, Q4H PRN         Objective:        PHYSICAL EXAM:    Vitals:  /60   Pulse 69   Temp 98.2 °F (36.8 °C) (Oral)   Resp 19   Ht 4' 11\" (1.499 m)   Wt 125 lb (56.7 kg)   SpO2 93%   BMI 25.25 kg/m²     General:  Appears comfortable. Answers questions appropriately and cooperative with exam  HEENT:  Mucous membranes moist. No erythema, rhinorrhea, or post-nasal drip noted. Neck:  No carotid bruits. Heart:  Rhythm regular at rate of 72  Lungs:  CTA. No wheeze, rales, or rhonchi  Abdomen:  Positive bowel sounds positive. Soft. Non-tender. No guarding, rebound or rigidity. Breast/Rectal/Genitourinary: not pertinent. Extremities:  Negative for lower extremity edema  Skin:  Warm and dry  Vascular: 2/4 Dorsalis Pedis pulses bilaterally. Neuro:  Cranial nerves 2-12 grossly intact, no focal weakness or change in sensation noted. Extraocular muscles intact.   Pupils equal, round, reactive to light. Recent Labs     12/19/20  1148 12/20/20  0245    132   K 2.5* 3.7  3.6    99   CO2 23 22   BUN 11 12   CREATININE 0.7 0.6   GLUCOSE 94 155*   CALCIUM 8.1* 9.2       Recent Labs     12/19/20  1148   WBC 5.8   RBC 3.77   HGB 11.4*   HCT 34.6   MCV 91.8   MCH 30.2   MCHC 32.9   RDW 13.1   *   MPV 9.3       BMP:    Lab Results   Component Value Date     12/20/2020    K 3.6 12/20/2020    K 3.7 12/20/2020    CL 99 12/20/2020    CO2 22 12/20/2020    BUN 12 12/20/2020    LABALBU 3.0 12/19/2020    CREATININE 0.6 12/20/2020    CALCIUM 9.2 12/20/2020    GFRAA >60 12/20/2020    LABGLOM >60 12/20/2020    GLUCOSE 155 12/20/2020     Magnesium:    Lab Results   Component Value Date    MG 2.0 12/20/2020        Radiology:   CT CHEST WO CONTRAST   Final Result   Patchy bilateral alveolar and interstitial infiltrates most notably in the   right upper, middle and left lower lobes probably representing pneumonia. Other infectious/inflammatory processes such as COVID-19 are also considered. Coronary artery/atherosclerotic disease   FINDINGS:   Mediastinum:   Lungs/pleura:   Upper Abdomen:   Soft Tissues/Bones:      XR CHEST PORTABLE   Final Result   Vague ill-defined infiltrate in the right parahilar region. The focal   pneumonia considered. Assessment:    Active Problems:    Acute respiratory failure with hypoxia ([address]    COVID-19  Resolved Problems:    * No resolved hospital problems. *      Plan:  1. Acute respiratory failure with hypoxia (o2sat 89%)POA adjust o2 as needed  2. Pneumonia due to covid 19  Pt on steroids  3. Hypokalemia monitor and replace prn  4. Hypomagnesemia monitor and replace prn  5.  Thrombocytopenia monitor  6. htn continue med    Chart reviewed and updated by nursing    Time spent is 35 min    Electronically signed by Amrik Sykes DO on 12/20/2020 at 10:51 PM Principal Discharge DX:	Cellulitis

## 2023-11-14 NOTE — CARE COORDINATION
CM NOTE: Per QFR---covid positive ---worsening resp status this am. On NRB & HHF with hypoxia. Unable to work with therapy. Currently full code---nurse contacting family. Await decision. Will continue to follow pt. Pt states has been taking Tylenol as needed the past 3 days and it only helps for a little bit but then headaches come back. Pt has been taking BP and is has been normal. Pt states she just finished working and is feeling a little better. Advised pt to eat and drink often and if gets worse to call or come in for appt. Pt voiced understanding.